# Patient Record
Sex: FEMALE | Race: WHITE | Employment: OTHER | URBAN - METROPOLITAN AREA
[De-identification: names, ages, dates, MRNs, and addresses within clinical notes are randomized per-mention and may not be internally consistent; named-entity substitution may affect disease eponyms.]

---

## 2017-05-03 ENCOUNTER — HOSPITAL ENCOUNTER (OUTPATIENT)
Dept: OTHER | Age: 52
Discharge: HOME | End: 2017-05-31

## 2017-05-08 ENCOUNTER — ANTI-COAG VISIT (OUTPATIENT)
Dept: PHARMACY | Facility: CLINIC | Age: 52
End: 2017-05-08

## 2017-05-08 DIAGNOSIS — Z95.2 HEART VALVE REPLACED: ICD-10-CM

## 2017-05-08 LAB — INR BLD: 1.4

## 2017-05-08 RX ORDER — WARFARIN SODIUM 2.5 MG/1
2.5 TABLET ORAL SEE ADMIN INSTRUCTIONS
COMMUNITY
End: 2020-12-30 | Stop reason: ALTCHOICE

## 2017-05-08 RX ORDER — CITALOPRAM 10 MG/1
10 TABLET ORAL DAILY
COMMUNITY

## 2017-05-08 RX ORDER — M-VIT,TX,IRON,MINS/CALC/FOLIC 27MG-0.4MG
1 TABLET ORAL DAILY
COMMUNITY

## 2017-05-15 ENCOUNTER — ANTI-COAG VISIT (OUTPATIENT)
Dept: PHARMACY | Facility: CLINIC | Age: 52
End: 2017-05-15

## 2017-05-15 DIAGNOSIS — Z95.2 HEART VALVE REPLACED: ICD-10-CM

## 2017-05-15 LAB — INR BLD: 2.6

## 2017-05-22 ENCOUNTER — ANTI-COAG VISIT (OUTPATIENT)
Dept: PHARMACY | Facility: CLINIC | Age: 52
End: 2017-05-22

## 2017-05-22 LAB — INR BLD: 2.9

## 2017-06-05 ENCOUNTER — ANTI-COAG VISIT (OUTPATIENT)
Dept: PHARMACY | Facility: CLINIC | Age: 52
End: 2017-06-05

## 2017-06-05 DIAGNOSIS — Z95.2 HEART VALVE REPLACED: ICD-10-CM

## 2017-06-05 LAB — INR BLD: 3.1

## 2017-06-26 ENCOUNTER — ANTI-COAG VISIT (OUTPATIENT)
Dept: PHARMACY | Facility: CLINIC | Age: 52
End: 2017-06-26

## 2017-06-26 LAB — INR BLD: 2.1

## 2017-07-10 ENCOUNTER — ANTI-COAG VISIT (OUTPATIENT)
Dept: PHARMACY | Facility: CLINIC | Age: 52
End: 2017-07-10

## 2017-07-10 LAB — INR BLD: 4.2

## 2017-07-24 ENCOUNTER — ANTI-COAG VISIT (OUTPATIENT)
Dept: PHARMACY | Facility: CLINIC | Age: 52
End: 2017-07-24

## 2017-07-24 LAB — INR BLD: 5.4

## 2017-07-31 ENCOUNTER — ANTI-COAG VISIT (OUTPATIENT)
Dept: PHARMACY | Facility: CLINIC | Age: 52
End: 2017-07-31

## 2017-07-31 LAB — INR BLD: 1.4

## 2017-08-07 ENCOUNTER — ANTI-COAG VISIT (OUTPATIENT)
Dept: PHARMACY | Facility: CLINIC | Age: 52
End: 2017-08-07

## 2017-08-07 LAB — INR BLD: 1.8

## 2017-08-14 ENCOUNTER — ANTI-COAG VISIT (OUTPATIENT)
Dept: PHARMACY | Facility: CLINIC | Age: 52
End: 2017-08-14

## 2017-08-14 LAB — INR BLD: 3.5

## 2017-08-28 ENCOUNTER — ANTI-COAG VISIT (OUTPATIENT)
Dept: PHARMACY | Facility: CLINIC | Age: 52
End: 2017-08-28

## 2017-08-28 DIAGNOSIS — Z95.2 HEART VALVE REPLACED: ICD-10-CM

## 2017-08-28 LAB — INR BLD: 5.2

## 2017-09-11 ENCOUNTER — ANTI-COAG VISIT (OUTPATIENT)
Dept: PHARMACY | Facility: CLINIC | Age: 52
End: 2017-09-11

## 2017-09-11 DIAGNOSIS — Z95.2 HEART VALVE REPLACED: ICD-10-CM

## 2017-09-11 LAB — INR BLD: 3.6

## 2017-10-02 ENCOUNTER — ANTI-COAG VISIT (OUTPATIENT)
Dept: PHARMACY | Facility: CLINIC | Age: 52
End: 2017-10-02

## 2017-10-02 LAB — INR BLD: 4

## 2017-10-02 NOTE — PROGRESS NOTES
Ms. Dontrell Medrano is here for management of anticoagulation for mitral heart valve replacement. No other significant PMH. She presents today w/out complaint. Pt verifies dosing regimen as listed above. Pt denies s/s bleeding/bruising/swelling/SOB. No BRBPR. No melena. Denies missed doses. Reviewed pt medication list.  No changes in RX/OTCs/Herbal medications. Reviewed dietary concerns. Denies tobacco use, occasional EToH use. She has returned to previous normal diet, had previously been avoiding greens. Has been taking 5 mg daily instead of 2.5 mg on Sun. INR 4.0 is above acceptable therapeutic range of 2.5-3.5. Due to patient taking more than was directed. Recommend to reduce to 5 mg daily except 2.5 mg Q Sun. Patient states she will also start to eat more salads in her diet. Patient has 5 mg tablets. Will continue to monitor and check INR in 3 weeks. Dosing reminder card given with phone number, appointment date and time.    Return to clinic: 10/23 @ 9:30 am    Mariama Washington, PharmD 10/2/2017 9:17 AM

## 2017-10-25 ENCOUNTER — ANTI-COAG VISIT (OUTPATIENT)
Dept: PHARMACY | Facility: CLINIC | Age: 52
End: 2017-10-25

## 2017-10-25 LAB — INR BLD: 2.2

## 2017-10-25 NOTE — PROGRESS NOTES
Ms. Earnest Valencia is here for management of anticoagulation for mitral heart valve replacement. No other significant PMH. She presents today w/out complaint. Pt verifies dosing regimen as listed above. Pt denies s/s bleeding/bruising/swelling/SOB. No BRBPR. No melena. Denies missed doses. Reviewed pt medication list.  No changes in RX/OTCs/Herbal medications. Reviewed dietary concerns. Denies tobacco use, occasional EToH use. Now INR is low after dose reduction last visit. Patient enjoys to eat salads, so will increase dose and instructed patient to continue her vitamin k intake. INR 2.2 is slightly below acceptable therapeutic range of 2.5-3.5. Recommend to increase to 5 mg daily. Patient has 5 mg tablets. Will continue to monitor and check INR in 2 weeks. Dosing reminder card given with phone number, appointment date and time.    Return to clinic: 11/13 @ 9:30am

## 2017-11-13 ENCOUNTER — ANTI-COAG VISIT (OUTPATIENT)
Dept: PHARMACY | Facility: CLINIC | Age: 52
End: 2017-11-13

## 2017-11-13 LAB — INR BLD: 2.9

## 2017-12-11 ENCOUNTER — ANTI-COAG VISIT (OUTPATIENT)
Dept: PHARMACY | Facility: CLINIC | Age: 52
End: 2017-12-11

## 2017-12-11 LAB — INR BLD: 2

## 2017-12-11 NOTE — PROGRESS NOTES
Ms. Harjinder Beckford is here for management of anticoagulation for mitral heart valve replacement. No other significant PMH. She presents today w/out complaint. Pt verifies dosing regimen as listed above. Pt denies s/s bleeding/bruising/swelling/SOB. No BRBPR. No melena. Denies missed doses. Reviewed pt medication list.  No changes in RX/OTCs/Herbal medications. Reviewed dietary concerns. Denies tobacco use, occasional EToH use. Patient did not want to change dose today, she would rather alter diet. Patient only available on mondays, so due to upcoming holidays, will allow 4 weeks. INR 2.0 is slightly below acceptable therapeutic range of 2.5-3.5. Recommend to take 7.5 mg today, then continue 5 mg daily. Patient has 5 mg tablets. Will continue to monitor and check INR in 4 weeks. Dosing reminder card given with phone number, appointment date and time.    Return to clinic: 1/8 @ 9:00am

## 2018-01-10 ENCOUNTER — ANTI-COAG VISIT (OUTPATIENT)
Dept: PHARMACY | Facility: CLINIC | Age: 53
End: 2018-01-10

## 2018-01-10 DIAGNOSIS — Z95.2 HEART VALVE REPLACED: ICD-10-CM

## 2018-01-10 LAB — INR BLD: 4.4

## 2018-01-17 ENCOUNTER — ANTI-COAG VISIT (OUTPATIENT)
Dept: PHARMACY | Facility: CLINIC | Age: 53
End: 2018-01-17

## 2018-01-17 LAB — INR BLD: 3.1

## 2018-01-17 NOTE — PROGRESS NOTES
Ms. Venessa Storm is here for management of anticoagulation for mitral heart valve replacement. No other significant PMH. She presents today w/out complaint. Pt verifies dosing regimen as listed above. Pt denies s/s bleeding/bruising/swelling/SOB. No BRBPR. No melena. Denies missed doses. Reviewed pt medication list.  No changes in RX/OTCs/Herbal medications. Reviewed dietary concerns. Denies tobacco use, occasional EToH use. INR 3.1 is within acceptable therapeutic range of 2.5-3.5. Recommend to continue 5 mg daily. Patient has 5 mg tablets. Will continue to monitor and check INR in 2 weeks. Dosing reminder card given with phone number, appointment date and time.    Return to clinic: 1/29 @ 9:00am

## 2018-01-31 ENCOUNTER — ANTI-COAG VISIT (OUTPATIENT)
Dept: PHARMACY | Facility: CLINIC | Age: 53
End: 2018-01-31

## 2018-01-31 LAB — INR BLD: 2.5

## 2018-02-28 ENCOUNTER — ANTI-COAG VISIT (OUTPATIENT)
Dept: PHARMACY | Facility: CLINIC | Age: 53
End: 2018-02-28

## 2018-02-28 LAB — INR BLD: 2.7

## 2018-03-28 ENCOUNTER — ANTI-COAG VISIT (OUTPATIENT)
Dept: PHARMACY | Facility: CLINIC | Age: 53
End: 2018-03-28

## 2018-03-28 LAB — INR BLD: 3.6

## 2018-04-25 ENCOUNTER — ANTI-COAG VISIT (OUTPATIENT)
Dept: PHARMACY | Facility: CLINIC | Age: 53
End: 2018-04-25

## 2018-04-25 LAB — INR BLD: 3.8

## 2018-05-02 ENCOUNTER — ANTI-COAG VISIT (OUTPATIENT)
Dept: PHARMACY | Facility: CLINIC | Age: 53
End: 2018-05-02

## 2018-05-02 DIAGNOSIS — Z95.2 HEART VALVE REPLACED: ICD-10-CM

## 2018-05-02 LAB — INR BLD: 1.6

## 2018-05-23 ENCOUNTER — ANTI-COAG VISIT (OUTPATIENT)
Dept: PHARMACY | Facility: CLINIC | Age: 53
End: 2018-05-23

## 2018-05-23 DIAGNOSIS — Z95.2 HEART VALVE REPLACED: ICD-10-CM

## 2018-05-23 LAB — INR BLD: 2.8

## 2018-06-20 ENCOUNTER — ANTI-COAG VISIT (OUTPATIENT)
Dept: PHARMACY | Facility: CLINIC | Age: 53
End: 2018-06-20

## 2018-06-20 DIAGNOSIS — Z95.2 HEART VALVE REPLACED: ICD-10-CM

## 2018-06-20 LAB — INR BLD: 1.6

## 2018-07-11 ENCOUNTER — ANTI-COAG VISIT (OUTPATIENT)
Dept: PHARMACY | Facility: CLINIC | Age: 53
End: 2018-07-11

## 2018-07-11 DIAGNOSIS — Z95.2 HEART VALVE REPLACED: ICD-10-CM

## 2018-07-11 LAB — INR BLD: 6.7

## 2018-07-11 NOTE — PROGRESS NOTES
Ms. Grecia Gonsalez is here for management of anticoagulation for mitral heart valve replacement. No other significant PMH. She presents today w/out complaint. Pt verifies dosing regimen as listed above. Pt denies s/s bleeding/bruising/swelling/SOB. No BRBPR. No melena. Denies missed doses. Reviewed pt medication list.  No changes in RX/OTCs/Herbal medications. Reviewed dietary concerns. Denies tobacco use, occasional EToH use.  6/4/18 Update: [facility] called and wanted patient's INR goal to change to 2.0 - 3.0. Patient had a prosthetic bovine mitral valve replacement and would therefore have a goal INR of 2-3. Will change goal.     No new medications or changes in medications. No change in diet. No abnormal bleeding but does admit to more bruising. Unclear why INR so high today. Has otherwise been mostly stable with this dose, including some low INRs. INR 6.7 is above acceptable therapeutic range of 2.0 - 3.0. Recommend hold dose for 2 days (7/11 and 7/12) then reduce dose to 2.5 mg Fri then 5mg all other days. Patient has 5 mg tablets. Will continue to monitor and check INR in 1 weeks. Dosing reminder card given with phone number, appointment date and time. Return to clinic: 7/18/18 at 8am.    I have seen the patient and reviewed the progress note written by the PharmD Candidate. I agree with this assessment and plan.    Xochilt Irving, Khloe 7/11/2018 8:21 AM

## 2018-07-18 ENCOUNTER — ANTI-COAG VISIT (OUTPATIENT)
Dept: PHARMACY | Age: 53
End: 2018-07-18
Payer: COMMERCIAL

## 2018-07-18 DIAGNOSIS — Z95.2 HEART VALVE REPLACED: ICD-10-CM

## 2018-07-18 LAB — INR BLD: 1.6

## 2018-07-18 PROCEDURE — 99211 OFF/OP EST MAY X REQ PHY/QHP: CPT | Performed by: PHARMACIST

## 2018-07-18 PROCEDURE — 85610 PROTHROMBIN TIME: CPT | Performed by: PHARMACIST

## 2018-07-23 ENCOUNTER — ANTI-COAG VISIT (OUTPATIENT)
Dept: PHARMACY | Age: 53
End: 2018-07-23
Payer: COMMERCIAL

## 2018-07-23 DIAGNOSIS — Z95.2 HEART VALVE REPLACED: ICD-10-CM

## 2018-07-23 LAB — INR BLD: 2.7

## 2018-07-23 PROCEDURE — 99211 OFF/OP EST MAY X REQ PHY/QHP: CPT | Performed by: PHARMACIST

## 2018-07-23 PROCEDURE — 85610 PROTHROMBIN TIME: CPT | Performed by: PHARMACIST

## 2018-07-23 NOTE — PROGRESS NOTES
Ms. Maribel Bernard is here for management of anticoagulation after bioprosthetic mitral heart valve replacement. She was placed on coumadin post operatively for acute, nonocclusive, but substantial thrombus involving the right axillary vein, subclavian vein, brachiocephalic vein, and internal jugular vein. Also Paroxysmal AFib   No other significant PMH. She presents today w/out complaint. Pt verifies dosing regimen as listed above. Pt denies s/s bleeding/bruising/swelling/SOB. No BRBPR. No melena. Denies missed doses. Reviewed pt medication list.  No changes in RX/OTCs/Herbal medications. Reviewed dietary concerns. Denies tobacco use, occasional EToH use.  18 Update: [facility] called and wanted patient's INR goal to change to 2.0 - 3.0. Patient had a prosthetic bovine mitral valve replacement and would therefore have a goal INR of 2-3. Will change goal.     Pt may be able to stop warfarin? Has appt with MD this week. INR back in range after being very high then low last week. Will continue with this slightly reduced dose. INR 2.7 is within acceptable therapeutic range of 2.0 - 3.0. Recommend to continue 2.5 mg Fri then 5mg all other days. Patient has 5 mg tablets. Will continue to monitor and check INR in 2  weeks. Dosing reminder card given with phone number, appointment date and time.    Return to clinic: 18 at 9:00 am.    Sheldon Tiwari PharmD 1:45 PM 18

## 2018-07-30 ENCOUNTER — HOSPITAL ENCOUNTER (OUTPATIENT)
Dept: GENERAL RADIOLOGY | Age: 53
Discharge: HOME | End: 2018-07-30
Payer: COMMERCIAL

## 2018-07-30 ENCOUNTER — ANTI-COAG VISIT (OUTPATIENT)
Dept: PHARMACY | Age: 53
End: 2018-07-30
Payer: COMMERCIAL

## 2018-07-30 ENCOUNTER — HOSPITAL ENCOUNTER (OUTPATIENT)
Age: 53
Discharge: HOME | End: 2018-07-30
Payer: COMMERCIAL

## 2018-07-30 DIAGNOSIS — M25.569 KNEE PAIN, UNSPECIFIED CHRONICITY, UNSPECIFIED LATERALITY: ICD-10-CM

## 2018-07-30 DIAGNOSIS — M25.551 PAIN OF BOTH HIP JOINTS: ICD-10-CM

## 2018-07-30 DIAGNOSIS — Z95.2 HEART VALVE REPLACED: ICD-10-CM

## 2018-07-30 DIAGNOSIS — M25.552 PAIN OF BOTH HIP JOINTS: ICD-10-CM

## 2018-07-30 LAB — INR BLD: 1.2

## 2018-07-30 PROCEDURE — 85610 PROTHROMBIN TIME: CPT | Performed by: PHARMACIST

## 2018-07-30 PROCEDURE — 73560 X-RAY EXAM OF KNEE 1 OR 2: CPT

## 2018-07-30 PROCEDURE — 73521 X-RAY EXAM HIPS BI 2 VIEWS: CPT

## 2018-07-30 PROCEDURE — 99211 OFF/OP EST MAY X REQ PHY/QHP: CPT | Performed by: PHARMACIST

## 2018-07-30 NOTE — PROGRESS NOTES
Ms. Wander Hardin is here for management of anticoagulation after bioprosthetic mitral heart valve replacement. She was placed on coumadin post operatively for acute, nonocclusive, but substantial thrombus involving the right axillary vein, subclavian vein, brachiocephalic vein, and internal jugular vein. Also Paroxysmal AFib   No other significant PMH. She presents today w/out complaint. Pt verifies dosing regimen as listed above. Pt denies s/s bleeding/bruising/swelling/SOB. No BRBPR. No melena. Reviewed pt medication list.--Stopped warfarin and switched to aspirin last week. Reviewed dietary concerns. Denies tobacco use, occasional EToH use. Warfarin was stopped last week by Dr. Lupe Whiteside. He requested INR check today. Pt is beyond window for needing anticoagulation for valve replacement, and per visit last week Her ECG today shows paced ventricular rhythm with underlying sinus rhythm  She has been started on ASA 81 mg daily instead. INR 1.2 is appropriate for having stopped warfarin last week  Will discharge patient from our care.     Edenilson Bañuelos, CharlineD 9:07 AM 7/30/18

## 2018-09-10 ENCOUNTER — APPOINTMENT (OUTPATIENT)
Dept: GENERAL RADIOLOGY | Age: 53
End: 2018-09-10
Payer: COMMERCIAL

## 2018-09-10 ENCOUNTER — HOSPITAL ENCOUNTER (EMERGENCY)
Age: 53
Discharge: HOME | End: 2018-09-10
Attending: EMERGENCY MEDICINE
Payer: COMMERCIAL

## 2018-09-10 VITALS
SYSTOLIC BLOOD PRESSURE: 121 MMHG | RESPIRATION RATE: 14 BRPM | DIASTOLIC BLOOD PRESSURE: 70 MMHG | TEMPERATURE: 98.2 F | HEART RATE: 78 BPM | OXYGEN SATURATION: 99 % | BODY MASS INDEX: 23.95 KG/M2 | HEIGHT: 66 IN | WEIGHT: 149 LBS

## 2018-09-10 DIAGNOSIS — S90.32XA CONTUSION OF LEFT FOOT, INITIAL ENCOUNTER: Primary | ICD-10-CM

## 2018-09-10 DIAGNOSIS — R09.81 NASAL CONGESTION: ICD-10-CM

## 2018-09-10 PROCEDURE — 73610 X-RAY EXAM OF ANKLE: CPT

## 2018-09-10 PROCEDURE — 73630 X-RAY EXAM OF FOOT: CPT

## 2018-09-10 PROCEDURE — 99283 EMERGENCY DEPT VISIT LOW MDM: CPT

## 2018-09-10 RX ORDER — FLUTICASONE PROPIONATE 50 MCG
1 SPRAY, SUSPENSION (ML) NASAL DAILY
Qty: 1 BOTTLE | Refills: 0 | Status: SHIPPED | OUTPATIENT
Start: 2018-09-10 | End: 2020-12-30

## 2018-09-10 RX ORDER — IBUPROFEN 600 MG/1
600 TABLET ORAL EVERY 6 HOURS PRN
Qty: 20 TABLET | Refills: 0 | Status: SHIPPED | OUTPATIENT
Start: 2018-09-10 | End: 2020-12-30 | Stop reason: ALTCHOICE

## 2018-09-10 ASSESSMENT — PAIN SCALES - GENERAL: PAINLEVEL_OUTOF10: 7

## 2018-09-10 ASSESSMENT — PAIN DESCRIPTION - LOCATION: LOCATION: FOOT

## 2018-09-10 ASSESSMENT — PAIN DESCRIPTION - PAIN TYPE: TYPE: ACUTE PAIN

## 2018-09-10 ASSESSMENT — PAIN DESCRIPTION - ORIENTATION: ORIENTATION: LEFT

## 2018-09-10 NOTE — ED NOTES
AVS provided and reviewed with the patient. Verbalized understanding of all including care at home, follow up care, ACE wrap and emergent symptoms to return for. Denies questions/concerns. Patient is alert/oriented, stable, and ambulatory at the time of discharge with personal belongings.        Leeann Vargas RN  09/10/18 0185

## 2018-09-10 NOTE — ED PROVIDER NOTES
is a slight contusion noted. No crepitance step-offs or deformities are appreciated  Normal ROM   neurovascular is intact with good capillary refill and sensory to all digits  Heart:  Regular rate and rhythm,    Perfusion:  intact  Respiratory:  Lungs clear to auscultation bilaterally. Respirations nonlabored. Abdominal:  Normal bowel sounds. Soft. Nontender. Non distended. Back:  No CVA tenderness to palpation     Neurological:  Alert and oriented times 3. No focal neuro deficits. Psychiatric:  Appropriate    I have reviewed and interpreted all of the currently available lab results from this visit (if applicable):  No results found for this visit on 09/10/18. Radiographs (if obtained):  [] The following radiograph was interpreted by myself in the absence of a radiologist:   [x] Radiologist's Report Reviewed:  XR ANKLE LEFT (MIN 3 VIEWS)   Final Result   No acute injury. Findings consistent with osteochondral defect from previous injury medial   talar dome. Appearance suggests that this is an unstable body. XR FOOT LEFT (MIN 3 VIEWS)   Final Result   Mild diffuse soft tissue swelling. Mild-to-moderate degenerative changes at the IP joints. Postsurgical changes from fusion at the PIP joint 2nd digit. EKG (if obtained): (All EKG's are interpreted by myself in the absence of a cardiologist)    Chart review shows recent radiographs:  No results found. MDM:  80-year-old female with a left foot injury. X-rays show old problems, including an osteochondroma and I I have requested the patient follow-up with orthopedics. She has seen one in the past.  I will also give her the 1 on call in case she cannot get in to her prior orthopedist.  She is to rest ice and elevate the foot and ankle. Return if worse or problems. She also has some nasal congestion. Her tympanic membranes are within normal limits.   I will start her on Flonase and have encouraged her to drink extra fluids. She can again return at any time and should follow-up with her primary care provider. She understands. Her questions have been answered and she is discharged in stable condition. Clinical Impression:  1. Contusion of left foot, initial encounter    2. Nasal congestion      Disposition referral (if applicable): Tonja Diop MD  [address]  [phone]    Schedule an appointment as soon as possible for a visit in 3 days      Agustín Justin, [address]  [phone]    Schedule an appointment as soon as possible for a visit in 3 days  Orthopedics or one of your own    [place]  [address] [place] [address],[address]  [phone]    If symptoms worsen    Disposition medications (if applicable):  New Prescriptions    FLUTICASONE (FLONASE) 50 MCG/ACT NASAL SPRAY    1 spray by Nasal route daily    IBUPROFEN (IBU) 600 MG TABLET    Take 1 tablet by mouth every 6 hours as needed for Pain       Comment: Please note this report has been produced using speech recognition software and may contain errors related to that system including errors in grammar, punctuation, and spelling, as well as words and phrases that may be inappropriate. If there are any questions or concerns please feel free to contact the dictating provider for clarification. Raul Cardona MD  09/10/18 7815

## 2019-05-13 ENCOUNTER — HOSPITAL ENCOUNTER (EMERGENCY)
Age: 54
Discharge: HOME | End: 2019-05-13
Attending: EMERGENCY MEDICINE
Payer: MEDICARE

## 2019-05-13 ENCOUNTER — APPOINTMENT (OUTPATIENT)
Dept: GENERAL RADIOLOGY | Age: 54
End: 2019-05-13
Payer: MEDICARE

## 2019-05-13 VITALS
HEIGHT: 66 IN | TEMPERATURE: 98.2 F | SYSTOLIC BLOOD PRESSURE: 119 MMHG | HEART RATE: 74 BPM | DIASTOLIC BLOOD PRESSURE: 78 MMHG | BODY MASS INDEX: 23.63 KG/M2 | WEIGHT: 147 LBS | OXYGEN SATURATION: 100 % | RESPIRATION RATE: 16 BRPM

## 2019-05-13 DIAGNOSIS — G89.29 ACUTE EXACERBATION OF CHRONIC LOW BACK PAIN: Primary | ICD-10-CM

## 2019-05-13 DIAGNOSIS — M54.50 ACUTE EXACERBATION OF CHRONIC LOW BACK PAIN: Primary | ICD-10-CM

## 2019-05-13 LAB
BILIRUBIN URINE: NEGATIVE
BLOOD, URINE: NEGATIVE
CLARITY: ABNORMAL
COLOR: YELLOW
GLUCOSE URINE: NEGATIVE MG/DL
KETONES, URINE: ABNORMAL MG/DL
LEUKOCYTE ESTERASE, URINE: NEGATIVE
MICROSCOPIC EXAMINATION: ABNORMAL
NITRITE, URINE: NEGATIVE
PH UA: 5.5 (ref 5–8)
PROTEIN UA: NEGATIVE MG/DL
SPECIFIC GRAVITY UA: 1.02 (ref 1–1.03)
URINE REFLEX TO CULTURE: ABNORMAL
URINE TYPE: ABNORMAL
UROBILINOGEN, URINE: 0.2 E.U./DL

## 2019-05-13 PROCEDURE — 81003 URINALYSIS AUTO W/O SCOPE: CPT

## 2019-05-13 PROCEDURE — 72100 X-RAY EXAM L-S SPINE 2/3 VWS: CPT

## 2019-05-13 PROCEDURE — 99283 EMERGENCY DEPT VISIT LOW MDM: CPT

## 2019-05-13 PROCEDURE — 6370000000 HC RX 637 (ALT 250 FOR IP): Performed by: EMERGENCY MEDICINE

## 2019-05-13 RX ORDER — LIDOCAINE 50 MG/G
1 PATCH TOPICAL DAILY
Qty: 7 PATCH | Refills: 0 | Status: SHIPPED | OUTPATIENT
Start: 2019-05-13 | End: 2019-05-20

## 2019-05-13 RX ORDER — LIDOCAINE 4 G/G
1 PATCH TOPICAL ONCE
Status: DISCONTINUED | OUTPATIENT
Start: 2019-05-13 | End: 2019-05-13 | Stop reason: HOSPADM

## 2019-05-13 RX ORDER — METHYLPREDNISOLONE 4 MG/1
TABLET ORAL
Qty: 1 KIT | Refills: 0 | Status: SHIPPED | OUTPATIENT
Start: 2019-05-13 | End: 2020-12-30

## 2019-05-13 RX ORDER — CYCLOBENZAPRINE HCL 10 MG
10 TABLET ORAL 3 TIMES DAILY PRN
Qty: 15 TABLET | Refills: 0 | Status: SHIPPED | OUTPATIENT
Start: 2019-05-13 | End: 2019-05-18

## 2019-05-13 ASSESSMENT — PAIN SCALES - GENERAL: PAINLEVEL_OUTOF10: 10

## 2019-05-13 ASSESSMENT — ENCOUNTER SYMPTOMS
ABDOMINAL PAIN: 0
COUGH: 0
SORE THROAT: 0
BACK PAIN: 1
NAUSEA: 0
EYE DISCHARGE: 0
VOMITING: 0
DIARRHEA: 0

## 2019-05-13 ASSESSMENT — PAIN DESCRIPTION - DESCRIPTORS: DESCRIPTORS: ACHING;THROBBING

## 2019-05-13 ASSESSMENT — PAIN DESCRIPTION - FREQUENCY: FREQUENCY: CONTINUOUS

## 2019-05-13 ASSESSMENT — PAIN DESCRIPTION - PAIN TYPE: TYPE: ACUTE PAIN

## 2019-05-13 ASSESSMENT — PAIN DESCRIPTION - LOCATION: LOCATION: BACK

## 2019-05-13 NOTE — ED PROVIDER NOTES
[facility]  eMERGENCY dEPARTMENT eNCOUnter        Pt Name: Sweetie Mendoza  MRN: [id number]  [place] 1965  Date of evaluation: 5/13/2019  Provider: Jones Tovar MD  PCP: Ruddy Foster MD  ED Attending: Jones Tovar MD    [address] [place] [address]       Chief Complaint   Patient presents with    Back Pain     Patient with back pain \"all over\" x 3 days, patient denies any urinary symptoms, patient states taking a percocet \"that I had at home and it didn't help. \"       HISTORY OF PRESENT ILLNESS   (Location/Symptom, Timing/Onset, Context/Setting, Quality, Duration, Modifying Factors, Severity)  Note limiting factors. Sweetie Mendoza is a 48 y.o. female Pamela Divers to the emergency department complaining of low back pain. Patient states it has been hurting for the last 3 days however last night she was not able to get any sleep. She describes it as severe sharp without radiation. Nothing seems to make it better or worse. She has taken some as needed Percocet without any relief. She denies any numbness tingling or weakness. No bowel or bladder incontinence. No IV drug abuse history. She does have previous history in 2007 of back surgery. Patient denies any saddle anesthesia. No night sweats, fevers, weight loss. History is obtained from the patient. REVIEW OF SYSTEMS    (2-9 systems for level 4, 10 or more for level 5)     Review of Systems   Constitutional: Negative for chills and fever. HENT: Negative for congestion and sore throat. Eyes: Negative for discharge. Respiratory: Negative for cough. Cardiovascular: Negative for chest pain. Gastrointestinal: Negative for abdominal pain, diarrhea, nausea and vomiting. Endocrine: Negative for polydipsia. Genitourinary: Negative for dysuria and urgency. Musculoskeletal: Positive for back pain. Negative for myalgias. Skin: Negative for rash.    Neurological: Negative for weakness, numbness and headaches. Hematological: Does not bruise/bleed easily. Psychiatric/Behavioral: Negative for confusion. Positives and Pertinent negatives as per HPI. Except as noted abovein the ROS, all other systems were reviewed and negative. PAST MEDICAL HISTORY     Past Medical History:   Diagnosis Date    Depression          SURGICAL HISTORY     Past Surgical History:   Procedure Laterality Date    BACK SURGERY      CARDIAC SURGERY      valve replacement and hole in heart    NECK SURGERY      PACEMAKER PLACEMENT      SINUS SURGERY           CURRENTMEDICATIONS       Discharge Medication List as of 5/13/2019 10:14 AM      CONTINUE these medications which have NOT CHANGED    Details   ibuprofen (IBU) 600 MG tablet Take 1 tablet by mouth every 6 hours as needed for Pain, Disp-20 tablet, R-0Print      fluticasone (FLONASE) 50 MCG/ACT nasal spray 1 spray by Nasal route daily, Disp-1 Bottle, R-0Print      loratadine (CLARITIN) 10 MG tablet Take 1 tablet by mouth daily, Disp-30 tablet, R-0Print      oxyCODONE-acetaminophen (PERCOCET) 7.5-325 MG per tablet Take 1 tablet by mouth every 4 hours as needed for Pain . Historical Med      warfarin (COUMADIN) 2.5 MG tablet Take 2.5 mg by mouth See Admin Instructions Take 5 mg daily, except 2.5 mg MWFHistorical Med      citalopram (CELEXA) 10 MG tablet Take 10 mg by mouth dailyHistorical Med      Multiple Vitamins-Minerals (THERAPEUTIC MULTIVITAMIN-MINERALS) tablet Take 1 tablet by mouth dailyHistorical Med               ALLERGIES     Compazine [prochlorperazine maleate]; Hydrocodone; Hydrocodone-acetaminophen; Pregabalin; and Prochlorperazine    FAMILYHISTORY     History reviewed. No pertinent family history.        SOCIAL HISTORY       Social History     Socioeconomic History    Marital status:      Spouse name: None    Number of children: None    Years of education: None    Highest education level: None   Occupational History    None   Social Needs    Financial resource strain: None    Food insecurity:     Worry: None     Inability: None    Transportation needs:     Medical: None     Non-medical: None   Tobacco Use    Smoking status: Never Smoker    Smokeless tobacco: Never Used   Substance and Sexual Activity    Alcohol use: Yes     Comment: occ    Drug use: No    Sexual activity: Yes     Partners: Male   Lifestyle    Physical activity:     Days per week: None     Minutes per session: None    Stress: None   Relationships    Social connections:     Talks on phone: None     Gets together: None     Attends Spiritism service: None     Active member of club or organization: None     Attends meetings of clubs or organizations: None     Relationship status: None    Intimate partner violence:     Fear of current or ex partner: None     Emotionally abused: None     Physically abused: None     Forced sexual activity: None   Other Topics Concern    None   Social History Narrative    None       SCREENINGS             PHYSICAL EXAM    (up to 7 for level 4, 8 or more for level 5)     ED Triage Vitals   BP Temp Temp src Pulse Resp SpO2 Height Weight   -- -- -- -- -- -- -- --       Physical Exam   Constitutional: She is oriented to person, place, and time. She appears well-developed and well-nourished. No distress. Uncomfortable and rubbing her low back. HENT:   Head: Normocephalic and atraumatic. Right Ear: External ear normal.   Left Ear: External ear normal.   Nose: Nose normal.   Mouth/Throat: Oropharynx is clear and moist. No oropharyngeal exudate. Eyes: Pupils are equal, round, and reactive to light. Conjunctivae are normal.   Neck: Normal range of motion. Neck supple. Cardiovascular: Normal rate, regular rhythm, normal heart sounds and intact distal pulses. Exam reveals no gallop and no friction rub. No murmur heard. Pulmonary/Chest: Effort normal and breath sounds normal. No respiratory distress. She has no wheezes. Abdominal: Soft.  Bowel sounds are normal. She exhibits no distension. There is no tenderness. There is no rebound and no guarding. Musculoskeletal: Normal range of motion. She exhibits no edema. Thoracic back: Normal.        Lumbar back: She exhibits tenderness, bony tenderness and pain. She exhibits normal range of motion, no swelling, no edema, no deformity, no laceration and no spasm. Back:    Lymphadenopathy:     She has no cervical adenopathy. Neurological: She is alert and oriented to person, place, and time. She has normal strength. No cranial nerve deficit or sensory deficit. Coordination and gait normal. GCS eye subscore is 4. GCS verbal subscore is 5. GCS motor subscore is 6. Reflex Scores:       Patellar reflexes are 2+ on the right side and 2+ on the left side. Achilles reflexes are 2+ on the right side and 2+ on the left side. Skin: Skin is warm and dry. No rash noted. Psychiatric: She has a normal mood and affect. DIAGNOSTIC RESULTS   LABS:    Results for orders placed or performed during the hospital encounter of 05/13/19   Urinalysis Reflex to Culture   Result Value Ref Range    Color, UA Yellow Straw/Yellow    Clarity, UA SL CLOUDY (A) Clear    Glucose, Ur Negative Negative mg/dL    Bilirubin Urine Negative Negative    Ketones, Urine TRACE (A) Negative mg/dL    Specific Gravity, UA 1.025 1.005 - 1.030    Blood, Urine Negative Negative    pH, UA 5.5 5.0 - 8.0    Protein, UA Negative Negative mg/dL    Urobilinogen, Urine 0.2 <2.0 E.U./dL    Nitrite, Urine Negative Negative    Leukocyte Esterase, Urine Negative Negative    Microscopic Examination Not Indicated     Urine Reflex to Culture Not Indicated     Urine Type Not Specified        All other labs were within normal range ornot returned as of this dictation. EKG:  All EKG's are interpreted by the Emergency Department Physician who either signs or Co-signs this chart in the absence of a cardiologist.  Please see their note for interpretation of EKG. RADIOLOGY:   Non-plain film images such as CT, Ultrasound and MRI are read by the radiologist.Plain radiographic images are visualized and preliminarily interpreted by the  ED Provider with the belowfindings:    Interpretation per the Radiologist below, if available at the time of this note:    XR LUMBAR SPINE (2-3 VIEWS)   Final Result   Stable postsurgical changes. PROCEDURES   Unless otherwise noted below, none     Procedures    CRITICAL CARE TIME   N/A    CONSULTS:  None      EMERGENCY DEPARTMENT COURSE and DIFFERENTIAL DIAGNOSIS/MDM:   Vitals:    Vitals:    05/13/19 0925   BP: 119/78   Pulse: 74   Resp: 16   Temp: 98.2 °F (36.8 °C)   TempSrc: Oral   SpO2: 100%   Weight: 147 lb (66.7 kg)   Height: 5' 6\" (1.676 m)       Patient was given the following medications:  Medications - No data to display    Imaging and urinalysis were obtained. Neither show any acute abnormalities. Patient drove herself to the emergency department limiting her treatment options. She cannot take anti-inflammatories. At this point I am going to place the patient on a steroid burst, provide her with muscle relaxer. I want her to follow up with her normal back pain treatment team.  Patient is agreeable with this plan. She understands that I cannot prescribe additional narcotic pain medication given that she is receiving this from another physician. I estimate there is LOW risk for ABDOMINAL AORTIC ANEURYSM, CAUDA EQUINA SYNDROME, EPIDURAL MASS LESION, SPINAL STENOSIS, OR HERNIATED DISK CAUSING SEVERE STENOSIS, thus I consider the discharge disposition reasonable. [address] [place] [address] and I have discussed the diagnosis and risks, and we agree with discharging home to follow-up with their primary doctor. We also discussed returning to the Emergency Department immediately if new or worsening symptoms occur.  We have discussed the symptoms which are most concerning (e.g., saddle anesthesia, urinary or bowel incontinence or retention, changing or worsening pain) that necessitate immediate return. FINAL IMPRESSION      1. Acute exacerbation of chronic low back pain          DISPOSITION/PLAN   DISPOSITION Decision To Discharge 05/13/2019 10:12:53 AM      PATIENT REFERRED TO:  Elsie Padilla MD  [address]  [phone]    Schedule an appointment as soon as possible for a visit in 3 days      [place] [address].  [facility]  Christina Mcdonald [address]  [phone]  Go to   If symptoms worsen      DISCHARGE MEDICATIONS:  Discharge Medication List as of 5/13/2019 10:14 AM      START taking these medications    Details   cyclobenzaprine (FLEXERIL) 10 MG tablet Take 1 tablet by mouth 3 times daily as needed for Muscle spasms, Disp-15 tablet, R-0Normal      lidocaine (LIDODERM) 5 % Place 1 patch onto the skin daily for 7 days 12 hours on, 12 hours off., Disp-7 patch, R-0Normal             DISCONTINUED MEDICATIONS:  Discharge Medication List as of 5/13/2019 10:14 AM                 (Please note that portions of this note were completed with a voice recognition program.  Efforts were [facility] edit the dictations but occasionally words are mis-transcribed.)    Ana Arguello MD(electronically signed)              Ana Arguello MD  05/13/19 7386

## 2020-12-30 ENCOUNTER — APPOINTMENT (OUTPATIENT)
Dept: GENERAL RADIOLOGY | Age: 55
End: 2020-12-30
Payer: MEDICARE

## 2020-12-30 ENCOUNTER — HOSPITAL ENCOUNTER (EMERGENCY)
Age: 55
Discharge: HOME | End: 2020-12-30
Attending: EMERGENCY MEDICINE
Payer: MEDICARE

## 2020-12-30 VITALS
OXYGEN SATURATION: 100 % | HEIGHT: 66 IN | SYSTOLIC BLOOD PRESSURE: 128 MMHG | DIASTOLIC BLOOD PRESSURE: 70 MMHG | HEART RATE: 87 BPM | BODY MASS INDEX: 23.3 KG/M2 | TEMPERATURE: 98 F | WEIGHT: 145 LBS | RESPIRATION RATE: 14 BRPM

## 2020-12-30 PROCEDURE — 6370000000 HC RX 637 (ALT 250 FOR IP): Performed by: EMERGENCY MEDICINE

## 2020-12-30 PROCEDURE — 29125 APPL SHORT ARM SPLINT STATIC: CPT

## 2020-12-30 PROCEDURE — 99284 EMERGENCY DEPT VISIT MOD MDM: CPT

## 2020-12-30 PROCEDURE — 73030 X-RAY EXAM OF SHOULDER: CPT

## 2020-12-30 PROCEDURE — 73110 X-RAY EXAM OF WRIST: CPT

## 2020-12-30 PROCEDURE — 73070 X-RAY EXAM OF ELBOW: CPT

## 2020-12-30 RX ORDER — IBUPROFEN 400 MG/1
800 TABLET ORAL ONCE
Status: DISCONTINUED | OUTPATIENT
Start: 2020-12-30 | End: 2020-12-30 | Stop reason: HOSPADM

## 2020-12-30 RX ORDER — OXYCODONE HYDROCHLORIDE 5 MG/1
10 TABLET ORAL ONCE
Status: COMPLETED | OUTPATIENT
Start: 2020-12-30 | End: 2020-12-30

## 2020-12-30 RX ORDER — PROPOFOL 10 MG/ML
100 INJECTION, EMULSION INTRAVENOUS ONCE
Status: DISCONTINUED | OUTPATIENT
Start: 2020-12-30 | End: 2020-12-30

## 2020-12-30 RX ORDER — OXYCODONE HYDROCHLORIDE AND ACETAMINOPHEN 5; 325 MG/1; MG/1
1 TABLET ORAL EVERY 6 HOURS PRN
Qty: 12 TABLET | Refills: 0 | Status: SHIPPED | OUTPATIENT
Start: 2020-12-30 | End: 2020-12-31 | Stop reason: ALTCHOICE

## 2020-12-30 RX ADMIN — OXYCODONE HYDROCHLORIDE 10 MG: 5 TABLET ORAL at 15:42

## 2020-12-30 ASSESSMENT — PAIN DESCRIPTION - LOCATION
LOCATION: ARM;WRIST
LOCATION: ARM;SHOULDER;WRIST

## 2020-12-30 ASSESSMENT — PAIN SCALES - GENERAL
PAINLEVEL_OUTOF10: 10
PAINLEVEL_OUTOF10: 8
PAINLEVEL_OUTOF10: 10

## 2020-12-30 ASSESSMENT — PAIN DESCRIPTION - PAIN TYPE
TYPE: ACUTE PAIN
TYPE: ACUTE PAIN

## 2020-12-30 ASSESSMENT — PAIN DESCRIPTION - ONSET
ONSET: ON-GOING
ONSET: ON-GOING

## 2020-12-30 ASSESSMENT — PAIN DESCRIPTION - ORIENTATION
ORIENTATION: LEFT
ORIENTATION: LEFT

## 2020-12-30 ASSESSMENT — PAIN DESCRIPTION - FREQUENCY
FREQUENCY: CONTINUOUS
FREQUENCY: CONTINUOUS

## 2020-12-30 ASSESSMENT — PAIN DESCRIPTION - DESCRIPTORS
DESCRIPTORS: CONSTANT
DESCRIPTORS: CONSTANT

## 2020-12-30 ASSESSMENT — PAIN DESCRIPTION - PROGRESSION: CLINICAL_PROGRESSION: GRADUALLY IMPROVING

## 2020-12-30 NOTE — ED PROVIDER NOTES
Emergency Physician Note  [id number] [address] [address]  Dept: [phone]  Loc: [phone]  Open Note Time:  4:02 PM EST    Chief Complaint  Arm Injury (left arm injury after trip and fall. c/o left shoulder and wrist pain)       History of Present Illness  Jose Luz is a 54 y.o. female  has a past medical history of Depression. who presents to the ED for left arm injury. Patient was using a hover board when she fell forward, [address] mechanism of the left arm. Had immediate pain and deformity to her left wrist she is also having difficulty rotating her left shoulder and she is complaining of left shoulder pain. Denies any pain in her elbow. She did not hit her head, did not lose consciousness. Does not believe she injured any other extremities of her body. Denies fever, chills, malaise, chest pain, shortness of breath, cough, abdominal pain, nausea, vomiting, diarrhea, headache, sore throat, dysuria, back pain, rash. No palliative/provocative factors. Unless otherwise stated in this report or unable to obtain because of the patient's clinical or mental status as evidenced by the medical record, this patient's positive and negative responses for review of systems, constitutional, psych, eyes, ENT, cardiovascular, respiratory, gastrointestinal, neurological, genitourinary, musculoskeletal, integument systems and systems related to the presenting problem are either stated in the preceding paragraph or were not pertinent or were negative for the symptoms and/or complaints related to the medical problem. I have reviewed the following from the nursing documentation:      Prior to Admission medications    Medication Sig Start Date End Date Taking? Authorizing Provider   oxyCODONE-acetaminophen (PERCOCET) 7.5-325 MG per tablet Take 1 tablet by mouth every 4 hours as needed for Pain .    Yes Historical Provider, MD loratadine (CLARITIN) 10 MG tablet Take 1 tablet by mouth daily 4/9/18   Taisha Reed PA-C   citalopram (CELEXA) 10 MG tablet Take 10 mg by mouth daily    Historical Provider, MD   Multiple Vitamins-Minerals (THERAPEUTIC MULTIVITAMIN-MINERALS) tablet Take 1 tablet by mouth daily    Historical Provider, MD       Allergies as of 12/30/2020 - Review Complete 12/30/2020   Allergen Reaction Noted    Compazine [prochlorperazine maleate]  08/14/2017    Hydrocodone Itching     Hydrocodone-acetaminophen Other (See Comments) 04/20/2018    Pregabalin      Prochlorperazine  12/05/2008       Past Medical History:   Diagnosis Date    Depression         Surgical History:   Past Surgical History:   Procedure Laterality Date    BACK SURGERY      CARDIAC SURGERY      valve replacement and hole in heart    NECK SURGERY      PACEMAKER PLACEMENT      SINUS SURGERY          Family History:  History reviewed. No pertinent family history.     Social History     Socioeconomic History    Marital status:      Spouse name: Not on file    Number of children: Not on file    Years of education: Not on file    Highest education level: Not on file   Occupational History    Not on file   Social Needs    Financial resource strain: Not on file    Food insecurity     Worry: Not on file     Inability: Not on file    Transportation needs     Medical: Not on file     Non-medical: Not on file   Tobacco Use    Smoking status: Never Smoker    Smokeless tobacco: Never Used   Substance and Sexual Activity    Alcohol use: Yes     Comment: occ    Drug use: No    Sexual activity: Yes     Partners: Male   Lifestyle    Physical activity     Days per week: Not on file     Minutes per session: Not on file    Stress: Not on file   Relationships    Social connections     Talks on phone: Not on file     Gets together: Not on file     Attends Restorationist service: Not on file     Active member of club or organization: Not on file have some pain radiating into her left shoulder  Left shoulder: No tenderness to palpation along the clavicle no AC joint tenderness, having difficulty with abduction of the left arm, the left shoulder does not appear to be inferior compared to the right shoulder. distal pulses present and equal bilaterally. Confirmed good radial pulses in both arms. BACK:  No midline tenderness in the cervical, thoracic, and lumbar spine. No deformities, no step-off. SKIN:  Warm, dry and intact. NEUROLOGIC:  Normal mental status. Moving all extremities to command. Alert and oriented x4   without focal motor deficit or gross sensory deficit. Normal speech. PSYCHIATRIC:  Not anxious,   normal mood and affect,   thoughts are linear and organized,   without delusions/hallucinations,   Not responding to internal stimuli,  responds appropriately to questions    LABS and DIAGNOSTIC RESULTS    RADIOLOGY  X-RAYS:  I have reviewed radiologic plain film image(s). ALL OTHER NON-PLAIN FILM IMAGES SUCH AS CT, ULTRASOUND AND MRI HAVE BEEN READ BY THE RADIOLOGIST. XR SHOULDER LEFT (MIN 2 VIEWS)   Final Result   Acute, comminuted, intra-articular fracture of the distal radius. No acute osseous injury of the left shoulder or elbow. XR ELBOW LEFT (2 VIEWS)   Final Result   Acute, comminuted, intra-articular fracture of the distal radius. No acute osseous injury of the left shoulder or elbow. XR WRIST LEFT (MIN 3 VIEWS)   Final Result   Acute, comminuted, intra-articular fracture of the distal radius. No acute osseous injury of the left shoulder or elbow. LABS  No results found for this visit on 12/30/20.     SCREENINGS  NIH Score     [facility]  [place] Coma Scale  Eye Opening: Spontaneous  Best Verbal Response: Oriented  Best Motor Response: Obeys commands  [place] Coma Scale Score: 15  [facility]    Heart Score              PROCEDURES    PROCEDURE:  FRACTURE CARE  Pre-splint neurovascular check:  Left upper extremity is neurovascularly intact with brisk cap refills and palpable extremity pulse. A splint was applied to the injured extremity. Person who placed the splint:  Nasir Tremont  Fracture type and location: Distal radial left wrist fracture  Splint type: Vlar  Post-splint neurovascular check: Left upper extremity is neurovascularly intact with brisk cap refills and palpable extremity pulse. Patient reports that her fingers frequently get cold and then no more cold than usual.  I do not have any concerns for compartment syndrome based on her post splint examination    The patient verbalized full understanding of how to care for injured extremity with splint. MEDICAL DECISION MAKING    Procedures/interventions/images ordered for this visit  Orders Placed This Encounter   Procedures    XR WRIST LEFT (MIN 3 VIEWS)    XR ELBOW LEFT (2 VIEWS)    XR SHOULDER LEFT (MIN 2 VIEWS)       Medications ordered for this visit  Orders Placed This Encounter   Medications    oxyCODONE (ROXICODONE) immediate release tablet 10 mg       ED course notes for this visit       I wore N95 Envo mask with filter protection, facial shield and gloves when I evaluated the patient. I evaluated the patient in room 11/11    Discussed the case with Dr. Gilda Dubon, the [facility] surgeon, he states there is no need for reduction as any reports at this time I can place a volar splint and he will follow up with her in the office either tomorrow or the following day. He informed me that he will call the patient himself and make arrangements for follow-up    This is a very pleasant patient who unfortunately has sustained a fracture. There is no significant evidence of compartment syndrome, neurovascular compromise, or other process requiring immediate surgical intervention at this time.   It is understood that other fractures, ligament injury, tendon injury, cartilage injury, and joint injury have been considered and cannot be completely excluded. Patient was reassessed after splinting and neurovascular status remained intact, alignment of the splint is good. Pain management and follow-up plan were discussed with the patient. It is understood that if the patient is not improving as expected or if other new symptoms or signs of concern develop, other etiologies or diagnoses may need to be considered requiring other tests, treatments, consultations, and/or admission. The diagnosis, plan, expected course, follow-up, and return precautions were discussed and all questions were answered. Final Impression    1. Fall from standing electric scooter, initial encounter    2. Other closed intra-articular fracture of distal end of left radius, initial encounter    3. Shoulder strain, left, initial encounter        Blood pressure 128/70, pulse 87, temperature 98 °F (36.7 °C), temperature source Oral, resp. rate 14, height 5' 6\" (1.676 m), weight 145 lb (65.8 kg), SpO2 100 %. Disposition  At this point I do not feel the patient requires further work up and it is reasonable to discharge the patient. I had a discussion with the patient and/or their surrogate regarding diagnosis, diagnostic testing results, treatment/ plan of care, and follow up. Patient and/or companions verbalized understanding of the ED workup, any relevant findings as well as any incidental findings, and the disposition and plan. There was shared decision-making between myself as well as the patient and/or their surrogate and we are all in agreement with discharge home. Trena Shore was an opportunity for questions and all questions were answered to the best of my ability and to the satisfaction of the patient and/or patient's family. Patient agreed to follow up as recommend for further evaluation/treatment. The patient was given strict return precautions as we discussed symptoms that would necessitate return to the ED. Patient will return to ED for new/worsening symptoms.  The patient verbalized their understanding and agreement with the above plan. Please refer to AVS for further details regarding discharge instructions. Patient was given scripts for the following medications. I counseled patient how to take these medications. Discharge Medication List as of 12/30/2020  6:30 PM      START taking these medications    Details   oxyCODONE-acetaminophen (PERCOCET) 5-325 MG per tablet Take 1 tablet by mouth every 6 hours as needed for Pain for up to 7 days. , Disp-12 tablet, R-0Normal             Patient had scripts modified or refilled for the following medications. I counseled patient how to take these medications. Discharge Medication List as of 12/30/2020  6:30 PM          Pt is in stable condition upon Discharge to home. The note was completed using Dragon voice recognition transcription. Every effort was made to ensure accuracy; however, inadvertent transcription errors may be present despite my best efforts to edit errors.     Marcos Esqueda MD  [facility]        Marcos Esqueda MD  12/30/20 [id number]

## 2020-12-31 ENCOUNTER — HOSPITAL ENCOUNTER (EMERGENCY)
Age: 55
Discharge: HOME | End: 2020-12-31
Attending: EMERGENCY MEDICINE
Payer: MEDICARE

## 2020-12-31 VITALS
BODY MASS INDEX: 23.3 KG/M2 | TEMPERATURE: 97.9 F | OXYGEN SATURATION: 99 % | SYSTOLIC BLOOD PRESSURE: 109 MMHG | HEART RATE: 85 BPM | HEIGHT: 66 IN | WEIGHT: 145 LBS | RESPIRATION RATE: 16 BRPM | DIASTOLIC BLOOD PRESSURE: 73 MMHG

## 2020-12-31 PROCEDURE — 29125 APPL SHORT ARM SPLINT STATIC: CPT

## 2020-12-31 PROCEDURE — 99284 EMERGENCY DEPT VISIT MOD MDM: CPT

## 2020-12-31 PROCEDURE — 6370000000 HC RX 637 (ALT 250 FOR IP): Performed by: EMERGENCY MEDICINE

## 2020-12-31 RX ORDER — ALENDRONATE SODIUM 70 MG/1
70 TABLET ORAL WEEKLY
COMMUNITY
Start: 2020-10-27 | End: 2021-06-30

## 2020-12-31 RX ORDER — OXYCODONE HYDROCHLORIDE AND ACETAMINOPHEN 5; 325 MG/1; MG/1
1 TABLET ORAL ONCE
Status: COMPLETED | OUTPATIENT
Start: 2020-12-31 | End: 2020-12-31

## 2020-12-31 RX ADMIN — OXYCODONE HYDROCHLORIDE AND ACETAMINOPHEN 1 TABLET: 5; 325 TABLET ORAL at 00:58

## 2020-12-31 ASSESSMENT — PAIN SCALES - GENERAL
PAINLEVEL_OUTOF10: 10
PAINLEVEL_OUTOF10: 8

## 2020-12-31 ASSESSMENT — PAIN DESCRIPTION - LOCATION: LOCATION: WRIST

## 2020-12-31 ASSESSMENT — PAIN DESCRIPTION - PROGRESSION: CLINICAL_PROGRESSION: NOT CHANGED

## 2020-12-31 ASSESSMENT — PAIN DESCRIPTION - ORIENTATION: ORIENTATION: LEFT

## 2020-12-31 NOTE — ED PROVIDER NOTES
Emergency Department Attending Note    Daniel Trinidad MD    Date of ED VIsit: 12/31/2020    CHIEF COMPLAINT  Wrist Pain (Seen today, fx left wrist.  \"Percocet isn't helping the pain\")      HISTORY OF PRESENT ILLNESS  Yaa Bañuelos is a 54 y.o. female  With Vital signs of /73   Pulse 85   Temp 97.9 °F (36.6 °C) (Oral)   Resp 16   Ht 5' 6\" (1.676 m)   Wt 145 lb (65.8 kg)   SpO2 99%   Breastfeeding No   BMI 23.40 kg/m²  who presents to the ED with a complaint of continued pain in her left wrist. Patient seen and evaluated in room 8. Patient comes in the emergency department because she said she was told to come in if she had any problems with her hand. She says that her hand is numb although physical exam of that hand reveals it to be pink with good capillary refill and good touch not cold to touch as well. The patient had a plastic Velcro splint on which was poor management choice earlier so the patient will be placed into a sugar tong splint and placed back into her sling. Regarding the pain I told her she can take 2 Percocet for pain control. .  No other complaints, modifying factors or associated symptoms. Patients Past medical history reviewed and listed below  Past Medical History:   Diagnosis Date    Depression      Past Surgical History:   Procedure Laterality Date    BACK SURGERY      CARDIAC SURGERY      valve replacement and hole in heart    NECK SURGERY      PACEMAKER PLACEMENT      SINUS SURGERY         I have reviewed the following from the nursing documentation. History reviewed. No pertinent family history.   Social History     Socioeconomic History    Marital status:      Spouse name: Not on file    Number of children: Not on file    Years of education: Not on file    Highest education level: Not on file   Occupational History    Not on file   Social Needs    Financial resource strain: Not on file    Food insecurity     Worry: Not on file     Inability: Not on file    Transportation needs     Medical: Not on file     Non-medical: Not on file   Tobacco Use    Smoking status: Never Smoker    Smokeless tobacco: Never Used   Substance and Sexual Activity    Alcohol use: Yes     Comment: occ    Drug use: No    Sexual activity: Yes     Partners: Male   Lifestyle    Physical activity     Days per week: Not on file     Minutes per session: Not on file    Stress: Not on file   Relationships    Social connections     Talks on phone: Not on file     Gets together: Not on file     Attends Orthodoxy service: Not on file     Active member of club or organization: Not on file     Attends meetings of clubs or organizations: Not on file     Relationship status: Not on file    Intimate partner violence     Fear of current or ex partner: Not on file     Emotionally abused: Not on file     Physically abused: Not on file     Forced sexual activity: Not on file   Other Topics Concern    Not on file   Social History Narrative    Not on file     No current facility-administered medications for this encounter. Current Outpatient Medications   Medication Sig Dispense Refill    loratadine (CLARITIN) 10 MG tablet Take 1 tablet by mouth daily 30 tablet 0    oxyCODONE-acetaminophen (PERCOCET) 7.5-325 MG per tablet Take 1 tablet by mouth every 4 hours as needed for Pain .  citalopram (CELEXA) 10 MG tablet Take 10 mg by mouth daily      Multiple Vitamins-Minerals (THERAPEUTIC MULTIVITAMIN-MINERALS) tablet Take 1 tablet by mouth daily      oxyCODONE-acetaminophen (PERCOCET) 5-325 MG per tablet Take 1 tablet by mouth every 6 hours as needed for Pain for up to 7 days.  12 tablet 0     Allergies   Allergen Reactions    Compazine [Prochlorperazine Maleate]     Hydrocodone Itching    Hydrocodone-Acetaminophen Other (See Comments)    Ibuprofen Other (See Comments)     \"my blood is thin and my Cardiologist said not to take it\"    Pregabalin     Prochlorperazine      Stiff neck and eyes roll back       REVIEW OF SYSTEMS  10 systems reviewed, pertinent positives per HPI otherwise noted to be negative     PHYSICAL EXAM  /73   Pulse 85   Temp 97.9 °F (36.6 °C) (Oral)   Resp 16   Ht 5' 6\" (1.676 m)   Wt 145 lb (65.8 kg)   SpO2 99%   Breastfeeding No   BMI 23.40 kg/m²   GENERAL APPEARANCE: Awake and alert. Cooperative. In mild distress. HEAD: Normocephalic. Atraumatic. EYES: PERRL. EOM's grossly intact. ENT: Mucous membranes are pink and moist.   NECK: Supple. HEART: RRR. No murmurs. LUNGS: Respirations unlabored. CTAB. Good air exchange. ABDOMEN: Soft. Non-distended. Non-tender. No masses. No organomegaly. No guarding or rebound. EXTREMITIES: No peripheral edema. Moves all extremities equally. All extremities neurovascularly intact. SKIN: Warm and dry. No acute rashes. NEUROLOGICAL: Alert and oriented. Distally she is neurovascularly intact with good capillary refill. Strength 5/5, sensation intact. Gait normal.   PSYCHIATRIC: Normal mood and affect. No HI or SI expressed to me. RADIOLOGY    If acquired see below     EKG:     If acquired see below       ED COURSE/MDM    Patient will be placed in a proper sugar tong splint. That will go up to the [facility]         The ED course and plan were reviewed and results discussed with the patient. She was told to double her Percocet dose if she needs to. The patient understood and agreed with the Discharge/transfer planning.     CLINICAL IMPRESSION and DISPOSITION    Rodney Coulter was stable and diagnosed with left wrist fracture    Patient was treated with sugar tong splint and Percocet       Azeem Mario MD  12/31/20 [id number]

## 2020-12-31 NOTE — PROGRESS NOTES
[address] [facility] time____0815________        Surgery time___0945_________    Take the following medications with a sip of water: Follow your MD/Surgeons pre-procedure instructions regarding your medications    Do not eat or drink anything after 12:00 midnight prior to your surgery. This includes water chewing gum, mints and ice chips. You may brush your teeth and gargle the morning of your surgery, but do not swallow the water     Please see your family doctor/pediatrician for a history and physical and/or concerning medications. Bring any test results/reports from your physicians office. If you are under the care of a heart doctor or specialist doctor, please be aware that you may be asked to them for clearance    You may be asked to stop blood thinners such as Coumadin, Plavix, Fragmin, Lovenox, etc., or any anti-inflammatories such as:  Aspirin, Ibuprofen, Advil, Naproxen prior to your surgery. We also ask that you stop any OTC medications such as fish oil, vitamin E, glucosamine, garlic, Multivitamins, COQ 10, etc.    We ask that you do not smoke 24 hours prior to surgery  We ask that you do not  drink any alcoholic beverages 24 hours prior to surgery     You must make arrangements for a responsible adult to take you home after your surgery. For your safety you will not be allowed to leave alone or drive yourself home. Your surgery will be cancelled if you do not have a ride home. Also for your safety, it is strongly suggested that someone stay with you the first 24 hours after your surgery. A parent or legal guardian must accompany a child scheduled for surgery and plan to stay at the hospital until the child is discharged. Please do not bring other children with you. For your comfort, please wear simple loose fitting clothing to the hospital.  Please do not bring valuables.     Do not wear any make-up or nail polish on your fingers or toes      For your safety, please do not wear any jewelry or body piercing's on the day of surgery. All jewelry must be removed. If you have dentures, they will be removed before going to operating room. For your convenience, we will provide you with a container. If you wear contact lenses or glasses, they will be removed, please bring a case for them. If you have a living will and a durable power of  for healthcare, please bring in a copy. As part of our patient safety program to minimize surgical site infections, we ask you to do the following:    · Please notify your surgeon if you develop any illness between         now and the  day of your surgery. · This includes a cough, cold, fever, sore throat, nausea,         or vomiting, and diarrhea, etc.  ·  Please notify your surgeon if you experience dizziness, shortness         of breath or blurred vision between now and the time of your surgery. Do not shave your operative site 96 hours prior to surgery. For face and neck surgery, men may use an electric razor 48 hours   prior to surgery. You may shower the night before surgery or the morning of   your surgery with an antibacterial soap. You will need to bring a photo ID and insurance card    [facility] has an onsite pharmacy, would you like to utilize our pharmacy     If you will be staying overnight and use a C-pap machine, please bring   your C-pap to hospital     Our goal is to provide you with excellent care, therefore, visitors will be limited to two(2) in the room at a time so that we may focus on providing this care for you. Please contact pre-admission testing if you have any further questions. [facility] phone number:  [phone] PAT fax number:  [phone]  Please note these are generalized instructions for all surgical cases, you may be provided with more specific instructions according to your surgery.

## 2020-12-31 NOTE — PROGRESS NOTES

## 2021-01-02 ENCOUNTER — ANESTHESIA EVENT (OUTPATIENT)
Dept: OPERATING ROOM | Age: 56
End: 2021-01-02
Payer: MEDICARE

## 2021-01-04 ENCOUNTER — ANESTHESIA (OUTPATIENT)
Dept: OPERATING ROOM | Age: 56
End: 2021-01-04
Payer: MEDICARE

## 2021-01-04 ENCOUNTER — HOSPITAL ENCOUNTER (OUTPATIENT)
Age: 56
Setting detail: OUTPATIENT SURGERY
Discharge: HOME | End: 2021-01-04
Attending: ORTHOPAEDIC SURGERY | Admitting: ORTHOPAEDIC SURGERY
Payer: MEDICARE

## 2021-01-04 ENCOUNTER — APPOINTMENT (OUTPATIENT)
Dept: GENERAL RADIOLOGY | Age: 56
End: 2021-01-04
Attending: ORTHOPAEDIC SURGERY
Payer: MEDICARE

## 2021-01-04 ENCOUNTER — TELEPHONE (OUTPATIENT)
Dept: ORTHOPEDIC SURGERY | Age: 56
End: 2021-01-04

## 2021-01-04 VITALS
HEIGHT: 66 IN | SYSTOLIC BLOOD PRESSURE: 131 MMHG | TEMPERATURE: 97.1 F | HEART RATE: 93 BPM | BODY MASS INDEX: 23.3 KG/M2 | DIASTOLIC BLOOD PRESSURE: 82 MMHG | RESPIRATION RATE: 18 BRPM | OXYGEN SATURATION: 98 % | WEIGHT: 145 LBS

## 2021-01-04 VITALS
DIASTOLIC BLOOD PRESSURE: 65 MMHG | SYSTOLIC BLOOD PRESSURE: 109 MMHG | OXYGEN SATURATION: 99 % | RESPIRATION RATE: 5 BRPM

## 2021-01-04 LAB — SARS-COV-2, NAAT: NOT DETECTED

## 2021-01-04 PROCEDURE — 2500000003 HC RX 250 WO HCPCS: Performed by: ORTHOPAEDIC SURGERY

## 2021-01-04 PROCEDURE — 6370000000 HC RX 637 (ALT 250 FOR IP): Performed by: ANESTHESIOLOGY

## 2021-01-04 PROCEDURE — C1713 ANCHOR/SCREW BN/BN,TIS/BN: HCPCS | Performed by: ORTHOPAEDIC SURGERY

## 2021-01-04 PROCEDURE — 2580000003 HC RX 258: Performed by: ORTHOPAEDIC SURGERY

## 2021-01-04 PROCEDURE — 3209999900 FLUORO FOR SURGICAL PROCEDURES

## 2021-01-04 PROCEDURE — 2500000003 HC RX 250 WO HCPCS: Performed by: NURSE ANESTHETIST, CERTIFIED REGISTERED

## 2021-01-04 PROCEDURE — 7100000001 HC PACU RECOVERY - ADDTL 15 MIN: Performed by: ORTHOPAEDIC SURGERY

## 2021-01-04 PROCEDURE — 7100000011 HC PHASE II RECOVERY - ADDTL 15 MIN: Performed by: ORTHOPAEDIC SURGERY

## 2021-01-04 PROCEDURE — 6360000002 HC RX W HCPCS: Performed by: NURSE ANESTHETIST, CERTIFIED REGISTERED

## 2021-01-04 PROCEDURE — 3600000004 HC SURGERY LEVEL 4 BASE: Performed by: ORTHOPAEDIC SURGERY

## 2021-01-04 PROCEDURE — 6360000002 HC RX W HCPCS: Performed by: ANESTHESIOLOGY

## 2021-01-04 PROCEDURE — 6360000002 HC RX W HCPCS: Performed by: ORTHOPAEDIC SURGERY

## 2021-01-04 PROCEDURE — 7100000000 HC PACU RECOVERY - FIRST 15 MIN: Performed by: ORTHOPAEDIC SURGERY

## 2021-01-04 PROCEDURE — 3600000014 HC SURGERY LEVEL 4 ADDTL 15MIN: Performed by: ORTHOPAEDIC SURGERY

## 2021-01-04 PROCEDURE — 7100000010 HC PHASE II RECOVERY - FIRST 15 MIN: Performed by: ORTHOPAEDIC SURGERY

## 2021-01-04 PROCEDURE — 25609 OPTX DST RD XART FX/EP SEP3+: CPT | Performed by: ORTHOPAEDIC SURGERY

## 2021-01-04 PROCEDURE — 25609 OPTX DST RD XART FX/EP SEP3+: CPT | Performed by: NURSE PRACTITIONER

## 2021-01-04 PROCEDURE — U0002 COVID-19 LAB TEST NON-CDC: HCPCS

## 2021-01-04 PROCEDURE — 3700000001 HC ADD 15 MINUTES (ANESTHESIA): Performed by: ORTHOPAEDIC SURGERY

## 2021-01-04 PROCEDURE — 2580000003 HC RX 258: Performed by: ANESTHESIOLOGY

## 2021-01-04 PROCEDURE — 99219 PR INITIAL OBSERVATION CARE/DAY 50 MINUTES: CPT | Performed by: ORTHOPAEDIC SURGERY

## 2021-01-04 PROCEDURE — 2720000010 HC SURG SUPPLY STERILE: Performed by: ORTHOPAEDIC SURGERY

## 2021-01-04 PROCEDURE — 3700000000 HC ANESTHESIA ATTENDED CARE: Performed by: ORTHOPAEDIC SURGERY

## 2021-01-04 PROCEDURE — 2709999900 HC NON-CHARGEABLE SUPPLY: Performed by: ORTHOPAEDIC SURGERY

## 2021-01-04 DEVICE — LOCKING SCREW, FULLY THREADED,T8
Type: IMPLANTABLE DEVICE | Site: WRIST | Status: FUNCTIONAL
Brand: VARIAX

## 2021-01-04 DEVICE — BONE SCREW, FULLY THREADED, T8
Type: IMPLANTABLE DEVICE | Site: WRIST | Status: FUNCTIONAL
Brand: VARIAX

## 2021-01-04 DEVICE — VOLAR PLATE INTERMEDIATE LEFT, X-SHORT
Type: IMPLANTABLE DEVICE | Site: WRIST | Status: FUNCTIONAL
Brand: VARIAX

## 2021-01-04 RX ORDER — CEPHALEXIN 500 MG/1
500 CAPSULE ORAL 4 TIMES DAILY
Qty: 20 CAPSULE | Refills: 0 | Status: SHIPPED | OUTPATIENT
Start: 2021-01-04 | End: 2021-01-09

## 2021-01-04 RX ORDER — OXYCODONE HYDROCHLORIDE 10 MG/1
10 TABLET ORAL PRN
Status: COMPLETED | OUTPATIENT
Start: 2021-01-04 | End: 2021-01-04

## 2021-01-04 RX ORDER — OXYCODONE HYDROCHLORIDE 5 MG/1
5 TABLET ORAL PRN
Status: COMPLETED | OUTPATIENT
Start: 2021-01-04 | End: 2021-01-04

## 2021-01-04 RX ORDER — SODIUM CHLORIDE 0.9 % (FLUSH) 0.9 %
10 SYRINGE (ML) INJECTION PRN
Status: DISCONTINUED | OUTPATIENT
Start: 2021-01-04 | End: 2021-01-04 | Stop reason: HOSPADM

## 2021-01-04 RX ORDER — MAGNESIUM HYDROXIDE 1200 MG/15ML
LIQUID ORAL CONTINUOUS PRN
Status: COMPLETED | OUTPATIENT
Start: 2021-01-04 | End: 2021-01-04

## 2021-01-04 RX ORDER — ONDANSETRON 2 MG/ML
INJECTION INTRAMUSCULAR; INTRAVENOUS PRN
Status: DISCONTINUED | OUTPATIENT
Start: 2021-01-04 | End: 2021-01-04 | Stop reason: SDUPTHER

## 2021-01-04 RX ORDER — FENTANYL CITRATE 50 UG/ML
25 INJECTION, SOLUTION INTRAMUSCULAR; INTRAVENOUS EVERY 5 MIN PRN
Status: DISCONTINUED | OUTPATIENT
Start: 2021-01-04 | End: 2021-01-04 | Stop reason: HOSPADM

## 2021-01-04 RX ORDER — MORPHINE SULFATE 2 MG/ML
1 INJECTION, SOLUTION INTRAMUSCULAR; INTRAVENOUS EVERY 5 MIN PRN
Status: DISCONTINUED | OUTPATIENT
Start: 2021-01-04 | End: 2021-01-04 | Stop reason: HOSPADM

## 2021-01-04 RX ORDER — BUPIVACAINE HYDROCHLORIDE 5 MG/ML
INJECTION, SOLUTION EPIDURAL; INTRACAUDAL
Status: COMPLETED | OUTPATIENT
Start: 2021-01-04 | End: 2021-01-04

## 2021-01-04 RX ORDER — DEXAMETHASONE SODIUM PHOSPHATE 4 MG/ML
INJECTION, SOLUTION INTRA-ARTICULAR; INTRALESIONAL; INTRAMUSCULAR; INTRAVENOUS; SOFT TISSUE PRN
Status: DISCONTINUED | OUTPATIENT
Start: 2021-01-04 | End: 2021-01-04 | Stop reason: SDUPTHER

## 2021-01-04 RX ORDER — EPHEDRINE SULFATE 50 MG/ML
INJECTION INTRAVENOUS PRN
Status: DISCONTINUED | OUTPATIENT
Start: 2021-01-04 | End: 2021-01-04

## 2021-01-04 RX ORDER — FENTANYL CITRATE 50 UG/ML
50 INJECTION, SOLUTION INTRAMUSCULAR; INTRAVENOUS EVERY 5 MIN PRN
Status: DISCONTINUED | OUTPATIENT
Start: 2021-01-04 | End: 2021-01-04 | Stop reason: HOSPADM

## 2021-01-04 RX ORDER — SODIUM CHLORIDE 0.9 % (FLUSH) 0.9 %
10 SYRINGE (ML) INJECTION EVERY 12 HOURS SCHEDULED
Status: DISCONTINUED | OUTPATIENT
Start: 2021-01-04 | End: 2021-01-04 | Stop reason: HOSPADM

## 2021-01-04 RX ORDER — EPHEDRINE SULFATE/0.9% NACL/PF 50 MG/5 ML
SYRINGE (ML) INTRAVENOUS PRN
Status: DISCONTINUED | OUTPATIENT
Start: 2021-01-04 | End: 2021-01-04 | Stop reason: SDUPTHER

## 2021-01-04 RX ORDER — FENTANYL CITRATE 50 UG/ML
INJECTION, SOLUTION INTRAMUSCULAR; INTRAVENOUS PRN
Status: DISCONTINUED | OUTPATIENT
Start: 2021-01-04 | End: 2021-01-04 | Stop reason: SDUPTHER

## 2021-01-04 RX ORDER — MORPHINE SULFATE 2 MG/ML
2 INJECTION, SOLUTION INTRAMUSCULAR; INTRAVENOUS EVERY 5 MIN PRN
Status: DISCONTINUED | OUTPATIENT
Start: 2021-01-04 | End: 2021-01-04 | Stop reason: HOSPADM

## 2021-01-04 RX ORDER — MIDAZOLAM HYDROCHLORIDE 1 MG/ML
INJECTION INTRAMUSCULAR; INTRAVENOUS PRN
Status: DISCONTINUED | OUTPATIENT
Start: 2021-01-04 | End: 2021-01-04 | Stop reason: SDUPTHER

## 2021-01-04 RX ORDER — LIDOCAINE HYDROCHLORIDE 20 MG/ML
INJECTION, SOLUTION EPIDURAL; INFILTRATION; INTRACAUDAL; PERINEURAL PRN
Status: DISCONTINUED | OUTPATIENT
Start: 2021-01-04 | End: 2021-01-04 | Stop reason: SDUPTHER

## 2021-01-04 RX ORDER — MEPERIDINE HYDROCHLORIDE 25 MG/ML
12.5 INJECTION INTRAMUSCULAR; INTRAVENOUS; SUBCUTANEOUS EVERY 5 MIN PRN
Status: DISCONTINUED | OUTPATIENT
Start: 2021-01-04 | End: 2021-01-04 | Stop reason: HOSPADM

## 2021-01-04 RX ORDER — PROPOFOL 10 MG/ML
INJECTION, EMULSION INTRAVENOUS PRN
Status: DISCONTINUED | OUTPATIENT
Start: 2021-01-04 | End: 2021-01-04 | Stop reason: SDUPTHER

## 2021-01-04 RX ORDER — SODIUM CHLORIDE 9 MG/ML
INJECTION, SOLUTION INTRAVENOUS CONTINUOUS
Status: DISCONTINUED | OUTPATIENT
Start: 2021-01-04 | End: 2021-01-04 | Stop reason: HOSPADM

## 2021-01-04 RX ORDER — ONDANSETRON 2 MG/ML
4 INJECTION INTRAMUSCULAR; INTRAVENOUS
Status: DISCONTINUED | OUTPATIENT
Start: 2021-01-04 | End: 2021-01-04 | Stop reason: HOSPADM

## 2021-01-04 RX ADMIN — PROPOFOL 50 MG: 10 INJECTION, EMULSION INTRAVENOUS at 09:16

## 2021-01-04 RX ADMIN — Medication 10 MG: at 09:40

## 2021-01-04 RX ADMIN — FENTANYL CITRATE 50 MCG: 50 INJECTION INTRAMUSCULAR; INTRAVENOUS at 09:15

## 2021-01-04 RX ADMIN — OXYCODONE HYDROCHLORIDE 10 MG: 10 TABLET ORAL at 11:18

## 2021-01-04 RX ADMIN — LIDOCAINE HYDROCHLORIDE 80 MG: 20 INJECTION, SOLUTION EPIDURAL; INFILTRATION; INTRACAUDAL; PERINEURAL at 09:15

## 2021-01-04 RX ADMIN — MIDAZOLAM 2 MG: 1 INJECTION INTRAMUSCULAR; INTRAVENOUS at 09:08

## 2021-01-04 RX ADMIN — CEFAZOLIN SODIUM 2 G: 10 INJECTION, POWDER, FOR SOLUTION INTRAVENOUS at 09:07

## 2021-01-04 RX ADMIN — DEXAMETHASONE SODIUM PHOSPHATE 8 MG: 4 INJECTION, SOLUTION INTRAMUSCULAR; INTRAVENOUS at 09:31

## 2021-01-04 RX ADMIN — ONDANSETRON 4 MG: 2 INJECTION INTRAMUSCULAR; INTRAVENOUS at 09:56

## 2021-01-04 RX ADMIN — SODIUM CHLORIDE: 9 INJECTION, SOLUTION INTRAVENOUS at 08:36

## 2021-01-04 RX ADMIN — FENTANYL CITRATE 50 MCG: 50 INJECTION, SOLUTION INTRAMUSCULAR; INTRAVENOUS at 10:34

## 2021-01-04 RX ADMIN — FENTANYL CITRATE 50 MCG: 50 INJECTION, SOLUTION INTRAMUSCULAR; INTRAVENOUS at 10:39

## 2021-01-04 RX ADMIN — PROPOFOL 150 MG: 10 INJECTION, EMULSION INTRAVENOUS at 09:15

## 2021-01-04 ASSESSMENT — PULMONARY FUNCTION TESTS
PIF_VALUE: 3
PIF_VALUE: 8
PIF_VALUE: 3
PIF_VALUE: 0
PIF_VALUE: 3
PIF_VALUE: 2
PIF_VALUE: 19
PIF_VALUE: 3
PIF_VALUE: 2
PIF_VALUE: 2
PIF_VALUE: 8
PIF_VALUE: 3
PIF_VALUE: 9
PIF_VALUE: 3
PIF_VALUE: 3
PIF_VALUE: 19
PIF_VALUE: 1
PIF_VALUE: 17
PIF_VALUE: 2
PIF_VALUE: 21
PIF_VALUE: 17
PIF_VALUE: 19
PIF_VALUE: 3
PIF_VALUE: 19
PIF_VALUE: 17
PIF_VALUE: 3
PIF_VALUE: 16
PIF_VALUE: 3
PIF_VALUE: 1
PIF_VALUE: 6
PIF_VALUE: 3
PIF_VALUE: 3
PIF_VALUE: 8
PIF_VALUE: 0
PIF_VALUE: 18
PIF_VALUE: 8

## 2021-01-04 ASSESSMENT — PAIN - FUNCTIONAL ASSESSMENT
PAIN_FUNCTIONAL_ASSESSMENT: PREVENTS OR INTERFERES SOME ACTIVE ACTIVITIES AND ADLS
PAIN_FUNCTIONAL_ASSESSMENT: 0-10
PAIN_FUNCTIONAL_ASSESSMENT: PREVENTS OR INTERFERES SOME ACTIVE ACTIVITIES AND ADLS

## 2021-01-04 ASSESSMENT — PAIN DESCRIPTION - DESCRIPTORS
DESCRIPTORS: ACHING
DESCRIPTORS: ACHING
DESCRIPTORS: THROBBING

## 2021-01-04 ASSESSMENT — PAIN DESCRIPTION - PROGRESSION: CLINICAL_PROGRESSION: GRADUALLY WORSENING

## 2021-01-04 ASSESSMENT — PAIN DESCRIPTION - PAIN TYPE
TYPE: ACUTE PAIN
TYPE: SURGICAL PAIN

## 2021-01-04 ASSESSMENT — PAIN DESCRIPTION - LOCATION
LOCATION: WRIST
LOCATION: WRIST

## 2021-01-04 ASSESSMENT — PAIN SCALES - GENERAL: PAINLEVEL_OUTOF10: 8

## 2021-01-04 ASSESSMENT — PAIN DESCRIPTION - ORIENTATION
ORIENTATION: LEFT
ORIENTATION: LEFT

## 2021-01-04 ASSESSMENT — PAIN DESCRIPTION - FREQUENCY
FREQUENCY: CONTINUOUS
FREQUENCY: CONTINUOUS

## 2021-01-04 ASSESSMENT — PAIN DESCRIPTION - ONSET: ONSET: ON-GOING

## 2021-01-04 NOTE — BRIEF OP NOTE
Brief Postoperative Note      Patient: Suzy Do  YOB: 1965  MRN: [id number]    Date of Procedure: 1/4/2021    Pre-Op Diagnosis: LEFT DISTAL RADIUS FRACTURE    Post-Op Diagnosis: Same       Procedure(s):  OPEN REDUCTION INTERNAL FIXATION LEFT DISTAL RADIUS    Surgeon(s):  Vicky Ballard MD    Assistant: Bjorn Angel CNP    Anesthesia: General    Estimated Blood Loss (mL): Minimal    Complications: None    Specimens:   * No specimens in log *    Implants:  Implant Name Type Inv. Item Serial No.  Lot No. LRB No. Used Action   PLATE BNE T60PM [id number] 10 H XSH L DST RAD VOLAR TAMMI TI  PLATE BNE [id number] 10 H XSH L DST RAD VOLAR TAMMI TI  [employer] [facility]  Left 1 Implanted   SCREW BNE LCK 2.7X12 MM TI STRL VARIAX  SCREW BNE LCK 2.7X12 MM TI STRL VARIAX  [employer] [facility]  Left 1 Implanted   SCREW BNE L16MM OD27MM ST TIMO FULL THRD T8 DRV  SCREW BNE L16MM OD27MM ST TIMO FULL THRD T8 DRV  [employer] [facility]  Left 1 Implanted   SCREW BNE L18MM OD27MM ST TIMO FULL THRD T8 DRV  SCREW BNE L18MM OD27MM ST TIMO FULL THRD T8 DRV  [employer] [facility]  Left 4 Implanted   SCREW BNE L20MM OD27MM ST TIMO FULL THRD T8 DRV  SCREW BNE L20MM OD27MM ST TIMO FULL THRD T8 DRV  [facility]  Left 2 Implanted   SCREW T8 BONE 2. [id number]  SCREW T8 BONE 2. [id number]  [employer] [facility]  Left 1 Implanted         Drains: * No LDAs found *    Findings: Same    Electronically signed by Vicky Ballard MD on 1/4/2021 at 12:54 PM

## 2021-01-04 NOTE — PROGRESS NOTES
Pt snoring after second dose of pain medication, see mar. VSS, o2 sat did drop to 89% on RA after sencond dose of pain med, rebounded quickly to low 90's on room air. LUE dressing remains D&I. Ice pack in place. Normal brachial pulse, brisk cap refill, fingers warm to touch. Full sensation, able to wiggle fingers. IV site WDL.

## 2021-01-04 NOTE — H&P
Preoperative H&P Update    The patient's History and Physical in the medical record from 1/4/2021 was reviewed by me today. Past Medical History:   Diagnosis Date    Anxiety and depression     Aortic valve disease 2016    Arthritis     History of blood transfusion     Pacemaker     Medtronic     Past Surgical History:   Procedure Laterality Date    BACK SURGERY      rods/screw L4,5,6/ DJD    CARDIAC SURGERY  2017    aortic valve replacement and hole in heart    NECK SURGERY      screws and pins    PACEMAKER PLACEMENT  2017    medtronic model # [id number]    SINUS SURGERY       No current facility-administered medications on file prior to encounter. Current Outpatient Medications on File Prior to Encounter   Medication Sig Dispense Refill    alendronate (FOSAMAX) 70 MG tablet Take 70 mg by mouth once a week      Biotin 5 MG TBDP Take 1 tablet by mouth daily      Cholecalciferol 50 MCG (2000 UT) CAPS 2,000 Units daily       oxyCODONE-acetaminophen (PERCOCET) 7.5-325 MG per tablet Take 1 tablet by mouth every 4 hours as needed for Pain .  citalopram (CELEXA) 10 MG tablet Take 10 mg by mouth daily      Multiple Vitamins-Minerals (THERAPEUTIC MULTIVITAMIN-MINERALS) tablet Take 1 tablet by mouth daily         Allergies   Allergen Reactions    Compazine [Prochlorperazine Maleate] Other (See Comments)     Pt states becomes stiff--eyes roll behind head    Hydrocodone Itching    Hydrocodone-Acetaminophen Other (See Comments)     insomnia    Ibuprofen Other (See Comments)     \"my blood is thin and my Cardiologist said not to take it\"    Pregabalin Other (See Comments)     Pt unable to recall reaction    Sulfamethoxazole-Trimethoprim Rash      I reviewed the HPI, medications, allergies, reason for surgery, diagnosis and treatment plan and there has been no change. The patient was evaluated by me today.  Physical exam findings for this update include:    Vitals:    01/04/21 0811   BP: 131/72 Pulse: 55   Resp: 17   Temp: 97.2 °F (36.2 °C)   SpO2: 96%     Airway is intact  Chest: chest clear, no wheezing, rales, normal symmetric air entry, no tachypnea, retractions or cyanosis  Heart: regular rate and rhythm ; heart sounds normal  Findings on exam of the body region where surgery is to be performed include:  Left distal radius fracture.     Electronically signed by Neeraj Olmedo on 1/4/2021 at 8:23 AM

## 2021-01-04 NOTE — PROGRESS NOTES
Tolerating being up in chair. Continues with pain, but states she is ready to go home. Dr. Arturo Russ into write script for antibiotic.

## 2021-01-04 NOTE — PROGRESS NOTES
Pt A&Ox4 at this time. VSS. Rates pain in left wrist 8/10 but continues to fall asleep between questions. Ace wrap D&I. Normal brachial pulse, brisk cap refill, normal sensation, fingers warm to touch, able to wiggle fingers. IV site WDL.

## 2021-01-04 NOTE — PROGRESS NOTES
Pt to PACU from OR. Unresponsive at this time. VSS. Does not appear to be in any distress. LUE dressing D&I. Ice pack applied. Fingers warm to touch, brisk cap refill, normal brachial pulse. IV site WDL.

## 2021-01-04 NOTE — CONSULTS
[facility]  Consult Note        This patient is seen in consultation at the request of Dr Rowena aLm MD    Reason for Consult:  Left wrist pain/ markedly displaced distal radius intraarticular fracture. CHIEF COMPLAINT:  Left wrist pain/ fall. History Obtained From:  patient, electronic medical record    HISTORY OF PRESENT ILLNESS:    Ms. Junior Weaver is a 54 y.o.  female left handed who presents today for evaluation of a left wrist injury. The patient reports that this injury occurred when she fell using a hover board. She was first seen and evaluated in [place], when she was x-rayed and splinted, and asked to f/u with Orthopedics. The patient denies any other injuries. Rates pain a 9/10 VAS moderate, sharp, constant and show no change. Movement makes the pain worse, the splint and resting makes the pain better. Alleviating factors rest. No numbness or tingling sensation. Past Medical History:        Diagnosis Date    Anxiety and depression     Aortic valve disease 2016    Arthritis     History of blood transfusion     Pacemaker     Medtronic       Past Surgical History:        Procedure Laterality Date    BACK SURGERY      rods/screw L4,5,6/ DJD    CARDIAC SURGERY  2017    aortic valve replacement and hole in heart    FOREARM SURGERY Left 1/4/2021    OPEN REDUCTION INTERNAL FIXATION LEFT DISTAL RADIUS performed by Ainsley Molina MD at [address] [place] [address] Drive      screws and pins    PACEMAKER PLACEMENT  2017    medtronic model # [id number]    SINUS SURGERY         Medications prior to admission:   Prior to Admission medications    Medication Sig Start Date End Date Taking?  Authorizing Provider   alendronate (FOSAMAX) 70 MG tablet Take 70 mg by mouth once a week 10/27/20  Yes Historical Provider, MD   Biotin 5 MG TBDP Take 1 tablet by mouth daily 10/24/20  Yes Historical Provider, MD   Cholecalciferol 50 MCG (2000 UT) CAPS 2,000 Units daily    Yes Historical Provider, MD oxyCODONE-acetaminophen (PERCOCET) 7.5-325 MG per tablet Take 1 tablet by mouth every 4 hours as needed for Pain . Yes Historical Provider, MD   citalopram (CELEXA) 10 MG tablet Take 10 mg by mouth daily   Yes Historical Provider, MD   Multiple Vitamins-Minerals (THERAPEUTIC MULTIVITAMIN-MINERALS) tablet Take 1 tablet by mouth daily   Yes Historical Provider, MD       Current Medications:   No current facility-administered medications for this encounter.      Allergies:  Compazine [prochlorperazine maleate], Hydrocodone, Hydrocodone-acetaminophen, Ibuprofen, Pregabalin, and Sulfamethoxazole-trimethoprim    Social History     Socioeconomic History    Marital status:      Spouse name: Not on file    Number of children: Not on file    Years of education: Not on file    Highest education level: Not on file   Occupational History    Not on file   Social Needs    Financial resource strain: Not on file    Food insecurity     Worry: Not on file     Inability: Not on file    Transportation needs     Medical: Not on file     Non-medical: Not on file   Tobacco Use    Smoking status: Never Smoker    Smokeless tobacco: Never Used   Substance and Sexual Activity    Alcohol use: Yes     Comment: occ    Drug use: Never    Sexual activity: Yes     Partners: Male   Lifestyle    Physical activity     Days per week: Not on file     Minutes per session: Not on file    Stress: Not on file   Relationships    Social connections     Talks on phone: Not on file     Gets together: Not on file     Attends Gnosticist service: Not on file     Active member of club or organization: Not on file     Attends meetings of clubs or organizations: Not on file     Relationship status: Not on file    Intimate partner violence     Fear of current or ex partner: Not on file     Emotionally abused: Not on file     Physically abused: Not on file     Forced sexual activity: Not on file   Other Topics Concern    Not on file   Social History Narrative    Not on file       Family History:  Family History   Problem Relation Age of Onset    No Known Problems Mother     Heart Attack Father          REVIEW OF SYSTEMS:   CONSTITUTIONAL: Denies unexplained weight loss, fevers, chills or fatigue  NEUROLOGICAL: Denies unsteady gait or progressive weakness    PSYCHOLOGICAL: Denies anxiety, depression   SKIN: Denies skin changes, delayed healing, rash, itching   HEMATOLOGIC: Denies easy bleeding or bruising  ENDOCRINE: Denies excessive thirst, urination, heat/cold  RESPIRATORY: Denies current dyspnea, cough  CARDIOVASCULAR: Negative for chest pain at this time. EYES: Negative for photophobia and visual disturbance. ENT:  Negative for rhinorrhea, epistaxis, sore throat, or hearing loss. GI: Denies nausea, vomiting, diarrhea   : Denies bowel or bladder issues   MUSCULOSKELETAL: Left wrist pain. All other ROS reviewed in chart or with patient or family and are grossly negative. PHYSICAL EXAMINATION:  Ms. Britta Bryant is a very pleasant 54 y.o. female who seen today in no acute distress, awake, alert, and oriented. She is well nourished and groomed. Patient with normal affect. Body mass index is 23.4 kg/m². . Skin warm and dry. Resting respiratory rate is 16. Resp deep and easy. Pulse is with regular rate and rhythm    /72   Pulse 55   Temp 97.2 °F (36.2 °C) (Temporal)   Resp 17   Ht 5' 6\" (1.676 m)   Wt 145 lb (65.8 kg)   SpO2 96%   BMI 23.40 kg/m²        Airway is intact  Chest: chest clear, no wheezing, rales, normal symmetric air entry, no tachypnea, retractions or cyanosis  Heart: regular rate and rhythm ; heart sounds normal   Hearing intact, pupil equal and reactive bilateral  Lymphatics; No groin or axillary enlarged lymph nodes. Neck; No swelling  Abdomen; soft, non distended. MUSCULOSKELETAL:   On evaluation of her left upper extremity, there is moderate deformity. There is moderate swelling and moderate ecchymosis.   She is tender to palpation over the distal radius, and otherwise nontender over the remainder of the extremity. Range of motion is decreased secondary to pain over the left wrist, but no mechanical block. The skin overlying the left wrist is intact without evidence of lesion, laceration or abrasion. Distal pulses are 2+ and symmetric bilaterally. Sensation is grossly intact to light touch and symmetric bilaterally. NEUROLOGIC:   Sensory:    Touch:                     Right Upper Extremity:  normal                   Left Upper Extremity:  normal                  Right Lower Extremity:  normal                  Left Lower Extremity:  normal        DATA:    CBC: No results found for: WBC, RBC, HGB, HCT, MCV, MCH, MCHC, RDW, PLT, MPV  WBC:  No results found for: WBC  PT/INR:    Lab Results   Component Value Date    PROTIME 24.9 04/09/2018    INR 1.2 07/30/2018     PTT:  No results found for: APTT[APTT    IMAGING: Xrays dated 12/30/2020, 3 views of left wrist were reviewed, and showed markedly displaced distal radius intraarticular fracture. IMPRESSION: Left wrist pain/ markedly displaced distal radius intraarticular fracture. PLAN:  I discussed with Simran Li the overall alignment of the fracture and treatment options including both surgical and non-surgical treatment, and that my recommendation is an open reduction and internal fixation given the amount of displacement and comminution of the fracture. I discussed the risks and benefits of surgery with the patient, including but not limited to infection, bleeding, pain, injury to nerves or blood vessels failure of the surgery and need for additional surgery. All the patient's questions were answered. We discussed an expected post-operative course. She  is understanding of this and wishes to proceed. Thank you very much for the kind consultation and allowing me to participate in this patient's care.   I will continue to keep you apprised of her progress.          Roddy Ivey MD   1/4/2021  8:23 AM

## 2021-01-04 NOTE — TELEPHONE ENCOUNTER
CPT: 56372  BODY PART: left wrist  AUTHORIZATION: NPR    Per website (AIM), [phone]  Insurance is [employer] [facility]

## 2021-01-04 NOTE — ANESTHESIA POSTPROCEDURE EVALUATION
Department of Anesthesiology  Postprocedure Note    Patient: Dontrell Callaway  MRN: [id number]  YOB: 1965  Date of evaluation: 1/4/2021  Time:  5:20 PM     Procedure Summary     Date: 01/04/21 Room / Location:  [facility] Bou[facility] [address] [place] [address]    Anesthesia Start: 0908 Anesthesia Stop: 0228    Procedure: OPEN REDUCTION INTERNAL FIXATION LEFT DISTAL RADIUS (Left ) Diagnosis: (LEFT DISTAL RADIUS FRACTURE)    Surgeons: Ainsley Molina MD Responsible Provider: Sky Peña MD    Anesthesia Type: general ASA Status: 3          Anesthesia Type: general    Layla Phase I: Layla Score: 10    Layla Phase II: Layla Score: 9    Last vitals: Reviewed and per EMR flowsheets.        Anesthesia Post Evaluation    Patient location during evaluation: PACU  Patient participation: complete - patient participated  Level of consciousness: awake and alert  Pain score: 0  Airway patency: patent  Nausea & Vomiting: no nausea and no vomiting  Complications: no  Cardiovascular status: blood pressure returned to baseline  Respiratory status: acceptable  Hydration status: euvolemic

## 2021-01-04 NOTE — OP NOTE
[address] [facility]                                OPERATIVE REPORT    PATIENT NAME: Jordana Cerrato                   :        1965  MED REC NO:   [id number]                          ROOM:  ACCOUNT NO:   [de-identified]                           ADMIT DATE: 2021  PROVIDER:     Izabel Garcia MD    DATE OF PROCEDURE:  2021    PRIMARY CARE PHYSICIAN:  Zunilda Nguyen MD    PREOPERATIVE DIAGNOSIS:  Left distal radius three-part intra-articular  displaced fracture. POSTOPERATIVE DIAGNOSIS:  Left distal radius three-part intra-articular  displaced fracture. OPERATION PERFORMED:  Open treatment of left distal radius three-part  intra-articular fracture with open reduction and internal fixation. SURGEON:  Izabel Garcia MD    ASSISTANT:  Lizbet Figueroa CNP    ANESTHESIA:  General anesthesia. ESTIMATED BLOOD LOSS:  Minimal.    COMPLICATIONS:  None. TOURNIQUET:  Left upper arm, 250 mmHg. IMPLANT USED:  [place] titanium intermediate volar locking plate with a  total of seven distal 2.7 locking screws and two proximal screws. INDICATIONS:  This is a 49-year-old white female, left-hand dominant,  who sustained a fall on her left wrist using a hoverboard. She  sustained a distal radius intra-articular displaced fracture. She was  seen at [facility]. All risks, benefits and alternatives were  discussed with the patient. She agreed to proceed with the surgical  treatment. OPERATIVE PROCEDURE:  The patient's left wrist was marked. She received  2 gm Ancef IV preoperatively. The patient was then brought to the  operating room and underwent general anesthesia. A well-padded  tourniquet was placed, left upper arm. The left upper extremity was  then prepped and draped in regular sterile routine fashion. A time-out  was called confirming the patient name, site and procedure. Esmarch was used for exsanguination and tourniquet was inflated to 250  mmHg. A volar approach was performed. An incision was made over the  FCR tendon and tendon sheath was opened. At this point, we incised the  pronator quadratus muscle with a cautery. We exposed the fracture and  was found to be a three-part intra-articular fracture. We were able to  carefully reduce the fracture and secured with a styloid process K-wire. While maintaining the reduction, we put the plate in appropriate  position. We also used the tenaculum bone clamp to keep the reduction in place  given this instability of the fracture. At this point, we went ahead  and put one screw in the oblong hole, and then we reduced the fracture  to the plate and locked it with a total of seven distal 2.7 locking  screws. We added one more locking screw in the shaft. Overall, we were  very satisfied with the anatomic reduction and position of all the  screws. At this point, we went ahead and let the tourniquet down and hemostasis  was secured. We irrigated the incision copiously with normal saline. We closed the subcu with a 3-0 Vicryl and the skin with a 4-0 Monocryl. Steri-Strips were then applied. Dressing was then applied in the form  of Xeroform, 4x4, sterile Webril and a volar splint was applied. The patient tolerated the procedure well and was taken to recovery in  stable condition. Ernie Menendez CNP was 1st Assist given the nature of the procedure that needed advanced assistance. POSTOPERATIVE PLAN:  The patient will be discharged home. She will be  nonweightbearing, but she can start range of motion of her fingers  immediately and range of motion of the wrist in two weeks. Miko Mcbride MD    D: 01/04/2021 12:59:16       T: 01/04/2021 13:31:39     [id number]  Job#: [id number]     Doc#: [id number]    CC:   Dwayne Morales MD

## 2021-01-04 NOTE — ANESTHESIA PRE PROCEDURE
Cardiologist said not to take it\"    Pregabalin Other (See Comments)     Pt unable to recall reaction    Sulfamethoxazole-Trimethoprim Rash       Problem List:    Patient Active Problem List   Diagnosis Code    Heart valve replaced Z95.2       Past Medical History:        Diagnosis Date    Anxiety and depression     Aortic valve disease 2016    Arthritis     History of blood transfusion     Pacemaker     Medtronic       Past Surgical History:        Procedure Laterality Date    BACK SURGERY      rods/screw L4,5,6/ DJD    CARDIAC SURGERY  2017    aortic valve replacement and hole in heart    NECK SURGERY      screws and pins    PACEMAKER PLACEMENT  2017    medtronic model # [id number]    SINUS SURGERY         Social History:    Social History     Tobacco Use    Smoking status: Never Smoker    Smokeless tobacco: Never Used   Substance Use Topics    Alcohol use: Yes     Comment: occ                                Counseling given: Not Answered      Vital Signs (Current):   Vitals:    12/31/20 0941 01/04/21 0809 01/04/21 0811   BP:   131/72   Pulse:   55   Resp:   17   Temp:   97.2 °F (36.2 °C)   TempSrc:   Temporal   SpO2:   96%   Weight: 145 lb (65.8 kg) 145 lb (65.8 kg)    Height: 5' 6\" (1.676 m) 5' 6\" (1.676 m)                                               BP Readings from Last 3 Encounters:   01/04/21 131/72   12/31/20 109/73   12/30/20 128/70       NPO Status: Time of last liquid consumption: 2300                        Time of last solid consumption: 2300                        Date of last liquid consumption: 01/03/21                        Date of last solid food consumption: 01/03/21    BMI:   Wt Readings from Last 3 Encounters:   01/04/21 145 lb (65.8 kg)   12/31/20 145 lb (65.8 kg)   12/30/20 145 lb (65.8 kg)     Body mass index is 23.4 kg/m².     CBC: No results found for: WBC, RBC, HGB, HCT, MCV, RDW, PLT    CMP: No results found for: NA, K, CL, CO2, BUN, CREATININE, GFRAA, AGRATIO, LABGLOM, GLUCOSE, PROT, CALCIUM, BILITOT, ALKPHOS, AST, ALT    POC Tests: No results for input(s): POCGLU, POCNA, POCK, POCCL, POCBUN, POCHEMO, POCHCT in the last 72 hours. Coags:   Lab Results   Component Value Date    PROTIME 24.9 04/09/2018    INR 1.2 07/30/2018       HCG (If Applicable): No results found for: PREGTESTUR, PREGSERUM, HCG, HCGQUANT     ABGs: No results found for: PHART, PO2ART, CKO5CFT, WBW6UGN, BEART, P4CGXYMH     Type & Screen (If Applicable):  No results found for: LABABO, LABRH    Drug/Infectious Status (If Applicable):  No results found for: HIV, HEPCAB    COVID-19 Screening (If Applicable): No results found for: COVID19      Anesthesia Evaluation  Patient summary reviewed and Nursing notes reviewed  Airway: Mallampati: II  TM distance: >3 FB   Neck ROM: full  Mouth opening: > = 3 FB Dental: normal exam         Pulmonary:Negative Pulmonary ROS breath sounds clear to auscultation                             Cardiovascular:  Exercise tolerance: good (>4 METS),   (+) valvular problems/murmurs: MR, pacemaker:,     (-) hypertension, past MI, CAD, CABG/stent, dysrhythmias,  angina,  CHF, orthopnea, PND and  LR      Rhythm: regular                   ROS comment: status post mitral valve replacement, ASD repair, and pacemaker implantation in 2016     Neuro/Psych:   (+) psychiatric history:             ROS comment: Chronic neck and back pain GI/Hepatic/Renal: Neg GI/Hepatic/Renal ROS            Endo/Other:    (+) : arthritis:., no malignancy/cancer. (-) diabetes mellitus, hypothyroidism, hyperthyroidism, blood dyscrasia, no electrolyte abnormalities, no malignancy/cancer               Abdominal:           Vascular:                                        Anesthesia Plan      general     ASA 3       Induction: intravenous. MIPS: Postoperative opioids intended and Prophylactic antiemetics administered. Anesthetic plan and risks discussed with patient.       Plan discussed with Aminta Perez MD   1/4/2021    This pre-anesthesia assessment may be used as a history and physical.    DOS STAFF ADDENDUM:    Pt seen and examined, chart reviewed (including anesthesia, drug and allergy history). No interval changes to history and physical examination. Anesthetic plan, risks, benefits, alternatives, and personnel involved discussed with patient. Patient verbalized an understanding and agrees to proceed.       Sky Peña MD  January 4, 2021  8:34 AM

## 2021-01-04 NOTE — ANESTHESIA POSTPROCEDURE EVALUATION
[facility] Department of Anesthesiology  Post-Anesthesia Note       Name:  Cindi Wheat                                  Age:  54 y.o. MRN:  [id number]     Last Vitals & Oxygen Saturation: /82   Pulse 93   Temp 97.1 °F (36.2 °C) (Temporal)   Resp 18   Ht 5' 6\" (1.676 m)   Wt 145 lb (65.8 kg)   SpO2 98%   BMI 23.40 kg/m²   No data found. Level of consciousness:  Awake, alert    Respiratory: Respirations easy, no distress. Stable. Cardiovascular: Hemodynamically stable. Hydration: Adequate. PONV: Adequately managed. Post-op pain: Adequately controlled. Post-op assessment: Tolerated anesthetic well without complication. Complications:  None.     Yarely Jennings MD  January 4, 2021   4:23 PM

## 2021-01-08 ENCOUNTER — TELEPHONE (OUTPATIENT)
Dept: ORTHOPEDIC SURGERY | Age: 56
End: 2021-01-08

## 2021-01-08 NOTE — TELEPHONE ENCOUNTER
General Question     Subject: She would like a call back bc her L wrist \"feels weird\"    Patient and /or Facility Request: patient    Contact Number: [phone]

## 2021-01-12 ENCOUNTER — OFFICE VISIT (OUTPATIENT)
Dept: ORTHOPEDIC SURGERY | Age: 56
End: 2021-01-12
Payer: MEDICARE

## 2021-01-12 VITALS — HEIGHT: 66 IN | BODY MASS INDEX: 23.3 KG/M2 | WEIGHT: 145 LBS

## 2021-01-12 DIAGNOSIS — S52.502A CLOSED FRACTURE OF DISTAL END OF LEFT RADIUS, UNSPECIFIED FRACTURE MORPHOLOGY, INITIAL ENCOUNTER: Primary | ICD-10-CM

## 2021-01-12 PROCEDURE — 99024 POSTOP FOLLOW-UP VISIT: CPT | Performed by: ORTHOPAEDIC SURGERY

## 2021-01-12 PROCEDURE — L3908 WHO COCK-UP NONMOLDE PRE OTS: HCPCS | Performed by: ORTHOPAEDIC SURGERY

## 2021-01-16 NOTE — PROGRESS NOTES
DIAGNOSIS:  Left distal radius 3 parts intra-articular displaced fracture, status post ORIF. DATE OF SURGERY:  1/4/2021. HISTORY OF PRESENT ILLNESS:  Ms. Aron Saavedra 54 y.o.  female left handed returns today  for 2 weeks postoperative visit. The patient denies any significant pain in the left wrist. Rates pain a 6/10 VAS mild, aching, No numbness or tingling sensation. No fever or Chills. She  is in a splint. PHYSICAL EXAMINATION:  The incision is completely healed . No signs of any erythema or drainage. She  has no pain with the active or passive range of motion of the left wrist, but decrease ROM. She  has intact sensation, distally, and she  is neurovascularly intact. IMAGING:  Three views left wrist taken today in the office showed anatomic alignment of left distal radius, plate and screws in good position, no loosening. IMPRESSION:  2 weeks out from left distal radius ORIF and doing very well. PLAN:  I have told the patient to work on ROM, as well as strengthening exercises. A removable forearm brace applied. No heavy impact activities. The patient will come back for a follow up in 6 weeks. At that time, we will take 3 views of the left wrist. PT if needed then. As this patient has demonstrated risk factors for osteoporosis, such as age and evidence of a fracture, I have referred the patient back to the primary care physician for evaluation for osteoporosis, including consideration for DEXA scanning, if this is felt to be clinically indicated. The patient is advised to contact the primary care physician to follow-up for further evaluation. Procedures    Jacqueline Bailey Titan Wrist Long Forearm Brace     Patient was prescribed a Jacqueline Bailey Titan Wrist and Forearm Brace. The left wrist will require stabilization / immobilization from this semi-rigid / rigid orthosis to improve their function.   The orthosis will assist in protecting the affected area, provide functional support and facilitate healing. The patient was educated and fit by a healthcare professional with expert knowledge and specialization in brace application while under the direct supervision of the treating physician. Verbal and written instructions for the use of and application of this item were provided. They were instructed to contact the office immediately should the brace result in increased pain, decreased sensation, increased swelling or worsening of the condition.        Klaudia Rios MD

## 2021-05-12 ENCOUNTER — OFFICE VISIT (OUTPATIENT)
Dept: ORTHOPEDIC SURGERY | Age: 56
End: 2021-05-12
Payer: MEDICARE

## 2021-05-12 VITALS — HEIGHT: 66 IN | BODY MASS INDEX: 23.3 KG/M2 | WEIGHT: 145 LBS

## 2021-05-12 DIAGNOSIS — S52.502A CLOSED FRACTURE OF DISTAL END OF LEFT RADIUS, UNSPECIFIED FRACTURE MORPHOLOGY, INITIAL ENCOUNTER: ICD-10-CM

## 2021-05-12 DIAGNOSIS — M25.562 PAIN IN BOTH KNEES, UNSPECIFIED CHRONICITY: ICD-10-CM

## 2021-05-12 DIAGNOSIS — M17.12 PRIMARY OSTEOARTHRITIS OF LEFT KNEE: ICD-10-CM

## 2021-05-12 DIAGNOSIS — M25.561 PAIN IN BOTH KNEES, UNSPECIFIED CHRONICITY: ICD-10-CM

## 2021-05-12 DIAGNOSIS — M17.11 PRIMARY OSTEOARTHRITIS OF RIGHT KNEE: Primary | ICD-10-CM

## 2021-05-12 PROCEDURE — 20610 DRAIN/INJ JOINT/BURSA W/O US: CPT | Performed by: ORTHOPAEDIC SURGERY

## 2021-05-12 PROCEDURE — 99214 OFFICE O/P EST MOD 30 MIN: CPT | Performed by: ORTHOPAEDIC SURGERY

## 2021-05-12 RX ORDER — TRIAMCINOLONE ACETONIDE 40 MG/ML
40 INJECTION, SUSPENSION INTRA-ARTICULAR; INTRAMUSCULAR ONCE
Status: COMPLETED | OUTPATIENT
Start: 2021-05-12 | End: 2021-05-12

## 2021-05-12 RX ORDER — LIDOCAINE HYDROCHLORIDE 10 MG/ML
40 INJECTION, SOLUTION INFILTRATION; PERINEURAL ONCE
Status: COMPLETED | OUTPATIENT
Start: 2021-05-12 | End: 2021-05-12

## 2021-05-12 RX ADMIN — TRIAMCINOLONE ACETONIDE 40 MG: 40 INJECTION, SUSPENSION INTRA-ARTICULAR; INTRAMUSCULAR at 10:03

## 2021-05-12 RX ADMIN — LIDOCAINE HYDROCHLORIDE 40 MG: 10 INJECTION, SOLUTION INFILTRATION; PERINEURAL at 10:03

## 2021-05-12 RX ADMIN — TRIAMCINOLONE ACETONIDE 40 MG: 40 INJECTION, SUSPENSION INTRA-ARTICULAR; INTRAMUSCULAR at 10:02

## 2021-05-12 NOTE — PROGRESS NOTES
in the patient's chart under the Media tab. No change. PHYSICAL EXAMINATION:  Ms. Onel Logan is a very pleasant 54 y.o.  female who presents today in no acute distress, awake, alert, and oriented. She is well dressed, nourished and  groomed. Patient with normal affect. Height is  5' 6\" (1.676 m), weight is 145 lb (65.8 kg), Body mass index is 23.4 kg/m². Resting respiratory rate is 16. Examination of the gait, showed that the patient walks heel-toe with a non-antalgic gait and no limp. Examination of both knees showing full ROM, bilateral mild crepitus, tenderness on medial joint line, stable to varus and valgus stress. She has intact sensation and good pedal pulses. She has good strength in 2 planes, and has mild tenderness on deep palpation over the medial joint line. Knee reflex 1+ bilaterally. On examination of the left wrist, the incision is completely healed . No signs of any erythema or drainage. She  has no pain with the active or passive range of motion of the left wrist, full ROM. She  has intact sensation, distally, and she  is neurovascularly intact. IMAGING:  Xray 3 views of the bilateral knee was obtained today in the office and reviewed. These demonstrate mild degenerative changes with narrowing of the joint space in the patellar joint space compartment, subchondral sclerosis, and marginal osteophytosis. Xray, 3 views left wrist taken today in the office showed anatomic alignment of left distal radius, plate and screws in good position, no loosening. IMPRESSION:   1-Bilateral knee DJD.  2-3 months out from left distal radius ORIF and doing very well. PLAN: For the knees: I discussed with the patient the treatment options including both surgical and non-surgical treatment. We recommended Quad exercises and stretching of the calf and hamstrings which was taught to the patient today. She will take NSAIDS as needed.  I believe she will benefit from cortisone

## 2021-06-26 ENCOUNTER — APPOINTMENT (OUTPATIENT)
Dept: GENERAL RADIOLOGY | Age: 56
End: 2021-06-26
Payer: MEDICARE

## 2021-06-26 ENCOUNTER — HOSPITAL ENCOUNTER (EMERGENCY)
Age: 56
Discharge: HOME | End: 2021-06-27
Attending: EMERGENCY MEDICINE
Payer: MEDICARE

## 2021-06-26 DIAGNOSIS — R05.9 COUGH: Primary | ICD-10-CM

## 2021-06-26 LAB
A/G RATIO: 1.9 (ref 1.1–2.2)
ALBUMIN SERPL-MCNC: 4.2 G/DL (ref 3.4–5)
ALP BLD-CCNC: 87 U/L (ref 40–129)
ALT SERPL-CCNC: 21 U/L (ref 10–40)
ANION GAP SERPL CALCULATED.3IONS-SCNC: 11 MMOL/L (ref 3–16)
AST SERPL-CCNC: 27 U/L (ref 15–37)
BASOPHILS ABSOLUTE: 0 K/UL (ref 0–0.2)
BASOPHILS RELATIVE PERCENT: 0.3 %
BILIRUB SERPL-MCNC: 0.3 MG/DL (ref 0–1)
BUN BLDV-MCNC: 11 MG/DL (ref 7–20)
CALCIUM SERPL-MCNC: 8.9 MG/DL (ref 8.3–10.6)
CHLORIDE BLD-SCNC: 102 MMOL/L (ref 99–110)
CO2: 24 MMOL/L (ref 21–32)
CREAT SERPL-MCNC: 0.9 MG/DL (ref 0.6–1.1)
EOSINOPHILS ABSOLUTE: 0 K/UL (ref 0–0.6)
EOSINOPHILS RELATIVE PERCENT: 0.1 %
GFR AFRICAN AMERICAN: >60
GFR NON-AFRICAN AMERICAN: >60
GLOBULIN: 2.2 G/DL
GLUCOSE BLD-MCNC: 148 MG/DL (ref 70–99)
HCT VFR BLD CALC: 38 % (ref 36–48)
HEMOGLOBIN: 12.5 G/DL (ref 12–16)
LYMPHOCYTES ABSOLUTE: 0.8 K/UL (ref 1–5.1)
LYMPHOCYTES RELATIVE PERCENT: 17.8 %
MAGNESIUM: 1.7 MG/DL (ref 1.8–2.4)
MCH RBC QN AUTO: 29.9 PG (ref 26–34)
MCHC RBC AUTO-ENTMCNC: 32.9 G/DL (ref 31–36)
MCV RBC AUTO: 90.9 FL (ref 80–100)
MONOCYTES ABSOLUTE: 0.5 K/UL (ref 0–1.3)
MONOCYTES RELATIVE PERCENT: 12.7 %
NEUTROPHILS ABSOLUTE: 2.9 K/UL (ref 1.7–7.7)
NEUTROPHILS RELATIVE PERCENT: 69.1 %
PDW BLD-RTO: 14.4 % (ref 12.4–15.4)
PLATELET # BLD: 178 K/UL (ref 135–450)
PMV BLD AUTO: 8.5 FL (ref 5–10.5)
POTASSIUM REFLEX MAGNESIUM: 3.5 MMOL/L (ref 3.5–5.1)
RBC # BLD: 4.18 M/UL (ref 4–5.2)
SODIUM BLD-SCNC: 137 MMOL/L (ref 136–145)
TOTAL PROTEIN: 6.4 G/DL (ref 6.4–8.2)
WBC # BLD: 4.2 K/UL (ref 4–11)

## 2021-06-26 PROCEDURE — 96374 THER/PROPH/DIAG INJ IV PUSH: CPT

## 2021-06-26 PROCEDURE — 71045 X-RAY EXAM CHEST 1 VIEW: CPT

## 2021-06-26 PROCEDURE — 2580000003 HC RX 258: Performed by: EMERGENCY MEDICINE

## 2021-06-26 PROCEDURE — 36415 COLL VENOUS BLD VENIPUNCTURE: CPT

## 2021-06-26 PROCEDURE — 6360000002 HC RX W HCPCS: Performed by: EMERGENCY MEDICINE

## 2021-06-26 PROCEDURE — 99285 EMERGENCY DEPT VISIT HI MDM: CPT

## 2021-06-26 PROCEDURE — 85025 COMPLETE CBC W/AUTO DIFF WBC: CPT

## 2021-06-26 PROCEDURE — 83735 ASSAY OF MAGNESIUM: CPT

## 2021-06-26 PROCEDURE — 80053 COMPREHEN METABOLIC PANEL: CPT

## 2021-06-26 PROCEDURE — 6370000000 HC RX 637 (ALT 250 FOR IP): Performed by: EMERGENCY MEDICINE

## 2021-06-26 RX ORDER — BENZONATATE 100 MG/1
100 CAPSULE ORAL ONCE
Status: DISCONTINUED | OUTPATIENT
Start: 2021-06-27 | End: 2021-06-27 | Stop reason: HOSPADM

## 2021-06-26 RX ORDER — ACETAMINOPHEN 325 MG/1
650 TABLET ORAL
Status: DISCONTINUED | OUTPATIENT
Start: 2021-06-26 | End: 2021-06-26

## 2021-06-26 RX ORDER — BENZONATATE 100 MG/1
100 CAPSULE ORAL ONCE
Status: COMPLETED | OUTPATIENT
Start: 2021-06-26 | End: 2021-06-26

## 2021-06-26 RX ORDER — ACETAMINOPHEN 500 MG
1000 TABLET ORAL ONCE
Status: COMPLETED | OUTPATIENT
Start: 2021-06-26 | End: 2021-06-26

## 2021-06-26 RX ORDER — 0.9 % SODIUM CHLORIDE 0.9 %
1000 INTRAVENOUS SOLUTION INTRAVENOUS ONCE
Status: COMPLETED | OUTPATIENT
Start: 2021-06-26 | End: 2021-06-26

## 2021-06-26 RX ORDER — ONDANSETRON 2 MG/ML
4 INJECTION INTRAMUSCULAR; INTRAVENOUS ONCE
Status: COMPLETED | OUTPATIENT
Start: 2021-06-26 | End: 2021-06-26

## 2021-06-26 RX ADMIN — SODIUM CHLORIDE 1000 ML: 9 INJECTION, SOLUTION INTRAVENOUS at 21:00

## 2021-06-26 RX ADMIN — ONDANSETRON HYDROCHLORIDE 4 MG: 2 INJECTION, SOLUTION INTRAMUSCULAR; INTRAVENOUS at 21:00

## 2021-06-26 RX ADMIN — BENZONATATE 100 MG: 100 CAPSULE ORAL at 23:02

## 2021-06-26 RX ADMIN — SODIUM CHLORIDE 1000 ML: 9 INJECTION, SOLUTION INTRAVENOUS at 21:56

## 2021-06-26 RX ADMIN — ACETAMINOPHEN 1000 MG: 500 TABLET ORAL at 23:18

## 2021-06-26 ASSESSMENT — PAIN SCALES - GENERAL
PAINLEVEL_OUTOF10: 8
PAINLEVEL_OUTOF10: 7

## 2021-06-26 ASSESSMENT — PAIN DESCRIPTION - DESCRIPTORS: DESCRIPTORS: ACHING;TENDER

## 2021-06-26 ASSESSMENT — PAIN DESCRIPTION - PAIN TYPE: TYPE: ACUTE PAIN

## 2021-06-26 ASSESSMENT — PAIN DESCRIPTION - ONSET: ONSET: ON-GOING

## 2021-06-26 ASSESSMENT — PAIN DESCRIPTION - FREQUENCY: FREQUENCY: INTERMITTENT

## 2021-06-26 ASSESSMENT — PAIN DESCRIPTION - PROGRESSION: CLINICAL_PROGRESSION: GRADUALLY WORSENING

## 2021-06-26 ASSESSMENT — PAIN DESCRIPTION - LOCATION: LOCATION: GENERALIZED

## 2021-06-27 VITALS
OXYGEN SATURATION: 100 % | HEIGHT: 66 IN | SYSTOLIC BLOOD PRESSURE: 93 MMHG | DIASTOLIC BLOOD PRESSURE: 71 MMHG | WEIGHT: 147 LBS | TEMPERATURE: 98.3 F | HEART RATE: 71 BPM | RESPIRATION RATE: 16 BRPM | BODY MASS INDEX: 23.63 KG/M2

## 2021-06-27 RX ORDER — BENZONATATE 100 MG/1
100 CAPSULE ORAL 2 TIMES DAILY PRN
Qty: 20 CAPSULE | Refills: 0 | Status: ON HOLD | OUTPATIENT
Start: 2021-06-27 | End: 2021-07-01 | Stop reason: SDUPTHER

## 2021-06-27 ASSESSMENT — PAIN DESCRIPTION - PAIN TYPE: TYPE: CHRONIC PAIN;ACUTE PAIN

## 2021-06-27 ASSESSMENT — PAIN SCALES - GENERAL: PAINLEVEL_OUTOF10: 5

## 2021-06-27 ASSESSMENT — PAIN DESCRIPTION - LOCATION: LOCATION: BACK

## 2021-06-27 NOTE — PROGRESS NOTES
Pt is rating back pain an 8, dr Winston Montenegro verbal order with repeat back, pt can have 2 tabs- 325mg Tylenol

## 2021-06-27 NOTE — ED PROVIDER NOTES
PACEMAKER PLACEMENT  2017    medtronic model # [id number]    SINUS SURGERY         I have reviewed the following from the nursing documentation. Family History   Problem Relation Age of Onset    No Known Problems Mother     Heart Attack Father      Social History     Socioeconomic History    Marital status:      Spouse name: Not on file    Number of children: Not on file    Years of education: Not on file    Highest education level: Not on file   Occupational History    Not on file   Tobacco Use    Smoking status: Never Smoker    Smokeless tobacco: Never Used   Vaping Use    Vaping Use: Never used   Substance and Sexual Activity    Alcohol use: Yes     Comment: occ    Drug use: Never    Sexual activity: Yes     Partners: Male   Other Topics Concern    Not on file   Social History Narrative    Not on file     Social Determinants of Health     Financial Resource Strain:     Difficulty of Paying Living Expenses:    Food Insecurity:     Worried About Running Out of Food in the Last Year:     Ran Out of Food in the Last Year:    Transportation Needs:     Lack of Transportation (Medical):      Lack of Transportation (Non-Medical):    Physical Activity:     Days of Exercise per Week:     Minutes of Exercise per Session:    Stress:     Feeling of Stress :    Social Connections:     Frequency of Communication with Friends and Family:     Frequency of Social Gatherings with Friends and Family:     Attends Hoahaoism Services:     Active Member of Clubs or Organizations:     Attends Club or Organization Meetings:     Marital Status:    Intimate Partner Violence:     Fear of Current or Ex-Partner:     Emotionally Abused:     Physically Abused:     Sexually Abused:      Current Facility-Administered Medications   Medication Dose Route Frequency Provider Last Rate Last Admin    0.9 % sodium chloride bolus  1,000 mL Intravenous Once Waleska Brown MD        ondansetron [facility] injection 4 Non-distended. Non-tender. No masses. No organomegaly. No guarding or rebound. EXTREMITIES: No peripheral edema. Moves all extremities equally. All extremities neurovascularly intact. SKIN: Warm and dry. No acute rashes. NEUROLOGICAL: Alert and oriented. Strength 5/5, sensation intact. Gait normal.   PSYCHIATRIC: Normal mood and affect. No HI or SI expressed to me. RADIOLOGY    If acquired see below     EKG:     If acquired see below       ED COURSE/MDM        ED Course as of Jun 27 0606   Sat Jun 26, 2021 2117 Magnesium of 1.7   Magnesium(!):    Magnesium 1.70(!) [DL]   2117 CBC reveals a white count of 4.2 and H&H of 12 and 38 with a platelet count of 243   CBC Auto Differential(!):    WBC 4.2   RBC 4.18   Hemoglobin Quant 12.5   Hematocrit 38.0   MCV 90.9   MCH 29.9   MCHC 32.9   RDW 14.4   Platelet Count 471   MPV 8.5   Neutrophils % 69.1   Lymphocyte % 17.8   Monocytes % 12.7   Eosinophils % 0.1   Basophils % 0.3   Neutrophils Absolute 2.9   Lymphocytes Absolute 0.8(!)   Monocytes Absolute 0.5   Eosinophils Absolute 0.0   Basophils Absolute 0.0 [DL]   2117 Comprehensive metabolic panel was normal except for glucose of 148   Comprehensive Metabolic Panel w/ Reflex to MG(!):    Sodium 137   Potassium 3.5   Chloride 102   CO2 24   Anion Gap 11   Glucose 148(!)   BUN 11   Creatinine 0.9   GFR Non- >60   GFR African American >60   Calcium 8.9   Total Protein 6.4   Albumin 4.2   Albumin/Globulin Ratio 1.9   Bilirubin 0.3   Alk Phos 87   ALT 21   AST 27   Globulin 2.2 [DL]   2151 IMPRESSION:  Stable chronic changes with no acute abnormality seen.      XR CHEST PORTABLE [DL]      ED Course User Index  [DL] Mary Ann Goode MD       The ED course and plan were reviewed and results discussed with the patient    The patient understood and agreed with the Discharge/transfer planning.     CLINICAL IMPRESSION and DISPOSITION    William Salazar was stable and diagnosed with cough       Mariama Chill Shayna Jacobs MD  06/27/21 9110

## 2021-06-30 ENCOUNTER — HOSPITAL ENCOUNTER (OUTPATIENT)
Age: 56
Setting detail: OBSERVATION
Discharge: HOME | DRG: 177 | End: 2021-07-01
Attending: EMERGENCY MEDICINE | Admitting: INTERNAL MEDICINE
Payer: MEDICARE

## 2021-06-30 ENCOUNTER — APPOINTMENT (OUTPATIENT)
Dept: CT IMAGING | Age: 56
DRG: 177 | End: 2021-06-30
Payer: MEDICARE

## 2021-06-30 ENCOUNTER — APPOINTMENT (OUTPATIENT)
Dept: GENERAL RADIOLOGY | Age: 56
DRG: 177 | End: 2021-06-30
Payer: MEDICARE

## 2021-06-30 DIAGNOSIS — U07.1 COVID-19: Primary | ICD-10-CM

## 2021-06-30 DIAGNOSIS — R11.2 NAUSEA VOMITING AND DIARRHEA: ICD-10-CM

## 2021-06-30 DIAGNOSIS — R19.7 NAUSEA VOMITING AND DIARRHEA: ICD-10-CM

## 2021-06-30 DIAGNOSIS — R05.9 COUGH: ICD-10-CM

## 2021-06-30 DIAGNOSIS — R06.02 SHORTNESS OF BREATH: ICD-10-CM

## 2021-06-30 PROBLEM — E86.0 DEHYDRATION: Status: ACTIVE | Noted: 2021-06-30

## 2021-06-30 PROBLEM — E87.6 HYPOKALEMIA: Status: ACTIVE | Noted: 2021-06-30

## 2021-06-30 LAB
A/G RATIO: 1.3 (ref 1.1–2.2)
A/G RATIO: 1.4 (ref 1.1–2.2)
ALBUMIN SERPL-MCNC: 3 G/DL (ref 3.4–5)
ALBUMIN SERPL-MCNC: 3.6 G/DL (ref 3.4–5)
ALP BLD-CCNC: 107 U/L (ref 40–129)
ALP BLD-CCNC: 114 U/L (ref 40–129)
ALT SERPL-CCNC: 16 U/L (ref 10–40)
ALT SERPL-CCNC: 19 U/L (ref 10–40)
ANION GAP SERPL CALCULATED.3IONS-SCNC: 11 MMOL/L (ref 3–16)
ANION GAP SERPL CALCULATED.3IONS-SCNC: 9 MMOL/L (ref 3–16)
AST SERPL-CCNC: 41 U/L (ref 15–37)
AST SERPL-CCNC: 42 U/L (ref 15–37)
BASOPHILS ABSOLUTE: 0 K/UL (ref 0–0.2)
BASOPHILS ABSOLUTE: 0 K/UL (ref 0–0.2)
BASOPHILS RELATIVE PERCENT: 0.1 %
BASOPHILS RELATIVE PERCENT: 0.3 %
BILIRUB SERPL-MCNC: 0.3 MG/DL (ref 0–1)
BILIRUB SERPL-MCNC: 0.3 MG/DL (ref 0–1)
BUN BLDV-MCNC: 5 MG/DL (ref 7–20)
BUN BLDV-MCNC: 6 MG/DL (ref 7–20)
CALCIUM SERPL-MCNC: 6.9 MG/DL (ref 8.3–10.6)
CALCIUM SERPL-MCNC: 8.1 MG/DL (ref 8.3–10.6)
CHLORIDE BLD-SCNC: 101 MMOL/L (ref 99–110)
CHLORIDE BLD-SCNC: 102 MMOL/L (ref 99–110)
CO2: 23 MMOL/L (ref 21–32)
CO2: 25 MMOL/L (ref 21–32)
CREAT SERPL-MCNC: 0.6 MG/DL (ref 0.6–1.1)
CREAT SERPL-MCNC: 0.6 MG/DL (ref 0.6–1.1)
D DIMER: 218 NG/ML DDU (ref 0–229)
EKG ATRIAL RATE: 79 BPM
EKG DIAGNOSIS: NORMAL
EKG P AXIS: 77 DEGREES
EKG P-R INTERVAL: 158 MS
EKG Q-T INTERVAL: 432 MS
EKG QRS DURATION: 154 MS
EKG QTC CALCULATION (BAZETT): 495 MS
EKG R AXIS: -68 DEGREES
EKG T AXIS: 89 DEGREES
EKG VENTRICULAR RATE: 79 BPM
EOSINOPHILS ABSOLUTE: 0 K/UL (ref 0–0.6)
EOSINOPHILS ABSOLUTE: 0 K/UL (ref 0–0.6)
EOSINOPHILS RELATIVE PERCENT: 0 %
EOSINOPHILS RELATIVE PERCENT: 0 %
GFR AFRICAN AMERICAN: >60
GFR AFRICAN AMERICAN: >60
GFR NON-AFRICAN AMERICAN: >60
GFR NON-AFRICAN AMERICAN: >60
GLOBULIN: 2.3 G/DL
GLOBULIN: 2.6 G/DL
GLUCOSE BLD-MCNC: 115 MG/DL (ref 70–99)
GLUCOSE BLD-MCNC: 130 MG/DL (ref 70–99)
HCT VFR BLD CALC: 30.4 % (ref 36–48)
HCT VFR BLD CALC: 34.6 % (ref 36–48)
HEMOGLOBIN: 10.5 G/DL (ref 12–16)
HEMOGLOBIN: 11.7 G/DL (ref 12–16)
LACTIC ACID, SEPSIS: 1.5 MMOL/L (ref 0.4–1.9)
LYMPHOCYTES ABSOLUTE: 0.4 K/UL (ref 1–5.1)
LYMPHOCYTES ABSOLUTE: 0.5 K/UL (ref 1–5.1)
LYMPHOCYTES RELATIVE PERCENT: 11.6 %
LYMPHOCYTES RELATIVE PERCENT: 7.7 %
MAGNESIUM: 1.6 MG/DL (ref 1.8–2.4)
MAGNESIUM: 2 MG/DL (ref 1.8–2.4)
MCH RBC QN AUTO: 30.2 PG (ref 26–34)
MCH RBC QN AUTO: 31.1 PG (ref 26–34)
MCHC RBC AUTO-ENTMCNC: 33.7 G/DL (ref 31–36)
MCHC RBC AUTO-ENTMCNC: 34.6 G/DL (ref 31–36)
MCV RBC AUTO: 89.6 FL (ref 80–100)
MCV RBC AUTO: 89.9 FL (ref 80–100)
MONOCYTES ABSOLUTE: 0.1 K/UL (ref 0–1.3)
MONOCYTES ABSOLUTE: 0.1 K/UL (ref 0–1.3)
MONOCYTES RELATIVE PERCENT: 2 %
MONOCYTES RELATIVE PERCENT: 2.3 %
NEUTROPHILS ABSOLUTE: 3.8 K/UL (ref 1.7–7.7)
NEUTROPHILS ABSOLUTE: 4.3 K/UL (ref 1.7–7.7)
NEUTROPHILS RELATIVE PERCENT: 86 %
NEUTROPHILS RELATIVE PERCENT: 90 %
PDW BLD-RTO: 14.6 % (ref 12.4–15.4)
PDW BLD-RTO: 14.7 % (ref 12.4–15.4)
PLATELET # BLD: 125 K/UL (ref 135–450)
PLATELET # BLD: 133 K/UL (ref 135–450)
PMV BLD AUTO: 8.5 FL (ref 5–10.5)
PMV BLD AUTO: 8.7 FL (ref 5–10.5)
POTASSIUM REFLEX MAGNESIUM: 2.8 MMOL/L (ref 3.5–5.1)
POTASSIUM REFLEX MAGNESIUM: 3 MMOL/L (ref 3.5–5.1)
RBC # BLD: 3.38 M/UL (ref 4–5.2)
RBC # BLD: 3.86 M/UL (ref 4–5.2)
SARS-COV-2, NAAT: DETECTED
SODIUM BLD-SCNC: 134 MMOL/L (ref 136–145)
SODIUM BLD-SCNC: 137 MMOL/L (ref 136–145)
TOTAL PROTEIN: 5.3 G/DL (ref 6.4–8.2)
TOTAL PROTEIN: 6.2 G/DL (ref 6.4–8.2)
TROPONIN: <0.01 NG/ML
WBC # BLD: 4.4 K/UL (ref 4–11)
WBC # BLD: 4.7 K/UL (ref 4–11)

## 2021-06-30 PROCEDURE — 93005 ELECTROCARDIOGRAM TRACING: CPT | Performed by: EMERGENCY MEDICINE

## 2021-06-30 PROCEDURE — 2580000003 HC RX 258: Performed by: PHYSICIAN ASSISTANT

## 2021-06-30 PROCEDURE — 6360000002 HC RX W HCPCS: Performed by: PHYSICIAN ASSISTANT

## 2021-06-30 PROCEDURE — G0378 HOSPITAL OBSERVATION PER HR: HCPCS

## 2021-06-30 PROCEDURE — 99219 PR INITIAL OBSERVATION CARE/DAY 50 MINUTES: CPT | Performed by: PHYSICIAN ASSISTANT

## 2021-06-30 PROCEDURE — 83735 ASSAY OF MAGNESIUM: CPT

## 2021-06-30 PROCEDURE — 71260 CT THORAX DX C+: CPT

## 2021-06-30 PROCEDURE — 85025 COMPLETE CBC W/AUTO DIFF WBC: CPT

## 2021-06-30 PROCEDURE — 93010 ELECTROCARDIOGRAM REPORT: CPT | Performed by: INTERNAL MEDICINE

## 2021-06-30 PROCEDURE — 6360000004 HC RX CONTRAST MEDICATION: Performed by: EMERGENCY MEDICINE

## 2021-06-30 PROCEDURE — 71045 X-RAY EXAM CHEST 1 VIEW: CPT

## 2021-06-30 PROCEDURE — 85379 FIBRIN DEGRADATION QUANT: CPT

## 2021-06-30 PROCEDURE — 83605 ASSAY OF LACTIC ACID: CPT

## 2021-06-30 PROCEDURE — 36415 COLL VENOUS BLD VENIPUNCTURE: CPT

## 2021-06-30 PROCEDURE — 96374 THER/PROPH/DIAG INJ IV PUSH: CPT

## 2021-06-30 PROCEDURE — 80053 COMPREHEN METABOLIC PANEL: CPT

## 2021-06-30 PROCEDURE — 96375 TX/PRO/DX INJ NEW DRUG ADDON: CPT

## 2021-06-30 PROCEDURE — 6370000000 HC RX 637 (ALT 250 FOR IP): Performed by: PHYSICIAN ASSISTANT

## 2021-06-30 PROCEDURE — 87635 SARS-COV-2 COVID-19 AMP PRB: CPT

## 2021-06-30 PROCEDURE — 96361 HYDRATE IV INFUSION ADD-ON: CPT

## 2021-06-30 PROCEDURE — 6370000000 HC RX 637 (ALT 250 FOR IP): Performed by: EMERGENCY MEDICINE

## 2021-06-30 PROCEDURE — 84484 ASSAY OF TROPONIN QUANT: CPT

## 2021-06-30 PROCEDURE — 2580000003 HC RX 258: Performed by: EMERGENCY MEDICINE

## 2021-06-30 PROCEDURE — 6360000002 HC RX W HCPCS: Performed by: EMERGENCY MEDICINE

## 2021-06-30 PROCEDURE — 99285 EMERGENCY DEPT VISIT HI MDM: CPT

## 2021-06-30 PROCEDURE — 96372 THER/PROPH/DIAG INJ SC/IM: CPT

## 2021-06-30 RX ORDER — CITALOPRAM 20 MG/1
10 TABLET ORAL DAILY
Status: DISCONTINUED | OUTPATIENT
Start: 2021-07-01 | End: 2021-07-01 | Stop reason: HOSPADM

## 2021-06-30 RX ORDER — POLYETHYLENE GLYCOL 3350 17 G/17G
17 POWDER, FOR SOLUTION ORAL DAILY PRN
Status: DISCONTINUED | OUTPATIENT
Start: 2021-06-30 | End: 2021-07-01 | Stop reason: HOSPADM

## 2021-06-30 RX ORDER — DEXAMETHASONE SODIUM PHOSPHATE 10 MG/ML
10 INJECTION, SOLUTION INTRAMUSCULAR; INTRAVENOUS ONCE
Status: COMPLETED | OUTPATIENT
Start: 2021-06-30 | End: 2021-06-30

## 2021-06-30 RX ORDER — PROMETHAZINE HYDROCHLORIDE AND CODEINE PHOSPHATE 6.25; 1 MG/5ML; MG/5ML
5 SOLUTION ORAL ONCE
Status: COMPLETED | OUTPATIENT
Start: 2021-06-30 | End: 2021-06-30

## 2021-06-30 RX ORDER — ONDANSETRON 4 MG/1
4 TABLET, ORALLY DISINTEGRATING ORAL EVERY 8 HOURS PRN
Status: DISCONTINUED | OUTPATIENT
Start: 2021-06-30 | End: 2021-07-01 | Stop reason: HOSPADM

## 2021-06-30 RX ORDER — SODIUM CHLORIDE 0.9 % (FLUSH) 0.9 %
5-40 SYRINGE (ML) INJECTION PRN
Status: DISCONTINUED | OUTPATIENT
Start: 2021-06-30 | End: 2021-07-01 | Stop reason: HOSPADM

## 2021-06-30 RX ORDER — POTASSIUM CHLORIDE 20 MEQ/1
40 TABLET, EXTENDED RELEASE ORAL ONCE
Status: COMPLETED | OUTPATIENT
Start: 2021-06-30 | End: 2021-06-30

## 2021-06-30 RX ORDER — ACETAMINOPHEN 650 MG/1
650 SUPPOSITORY RECTAL EVERY 6 HOURS PRN
Status: DISCONTINUED | OUTPATIENT
Start: 2021-06-30 | End: 2021-07-01 | Stop reason: HOSPADM

## 2021-06-30 RX ORDER — GUAIFENESIN/DEXTROMETHORPHAN 100-10MG/5
5 SYRUP ORAL EVERY 4 HOURS PRN
Status: DISCONTINUED | OUTPATIENT
Start: 2021-06-30 | End: 2021-07-01 | Stop reason: HOSPADM

## 2021-06-30 RX ORDER — 0.9 % SODIUM CHLORIDE 0.9 %
1000 INTRAVENOUS SOLUTION INTRAVENOUS ONCE
Status: COMPLETED | OUTPATIENT
Start: 2021-06-30 | End: 2021-06-30

## 2021-06-30 RX ORDER — OXYCODONE AND ACETAMINOPHEN 7.5; 325 MG/1; MG/1
1 TABLET ORAL EVERY 4 HOURS PRN
Status: DISCONTINUED | OUTPATIENT
Start: 2021-06-30 | End: 2021-07-01 | Stop reason: HOSPADM

## 2021-06-30 RX ORDER — BENZONATATE 100 MG/1
100 CAPSULE ORAL ONCE
Status: COMPLETED | OUTPATIENT
Start: 2021-06-30 | End: 2021-06-30

## 2021-06-30 RX ORDER — SODIUM CHLORIDE 0.9 % (FLUSH) 0.9 %
5-40 SYRINGE (ML) INJECTION EVERY 12 HOURS SCHEDULED
Status: DISCONTINUED | OUTPATIENT
Start: 2021-06-30 | End: 2021-07-01 | Stop reason: HOSPADM

## 2021-06-30 RX ORDER — ACETAMINOPHEN 325 MG/1
975 TABLET ORAL ONCE
Status: COMPLETED | OUTPATIENT
Start: 2021-06-30 | End: 2021-06-30

## 2021-06-30 RX ORDER — SODIUM CHLORIDE 9 MG/ML
INJECTION, SOLUTION INTRAVENOUS CONTINUOUS
Status: DISCONTINUED | OUTPATIENT
Start: 2021-06-30 | End: 2021-07-01 | Stop reason: HOSPADM

## 2021-06-30 RX ORDER — SODIUM CHLORIDE 9 MG/ML
25 INJECTION, SOLUTION INTRAVENOUS PRN
Status: DISCONTINUED | OUTPATIENT
Start: 2021-06-30 | End: 2021-07-01 | Stop reason: HOSPADM

## 2021-06-30 RX ORDER — ACETAMINOPHEN 325 MG/1
650 TABLET ORAL EVERY 6 HOURS PRN
Status: DISCONTINUED | OUTPATIENT
Start: 2021-06-30 | End: 2021-07-01 | Stop reason: HOSPADM

## 2021-06-30 RX ORDER — ONDANSETRON 2 MG/ML
4 INJECTION INTRAMUSCULAR; INTRAVENOUS
Status: DISCONTINUED | OUTPATIENT
Start: 2021-06-30 | End: 2021-06-30

## 2021-06-30 RX ORDER — ONDANSETRON 2 MG/ML
4 INJECTION INTRAMUSCULAR; INTRAVENOUS EVERY 6 HOURS PRN
Status: DISCONTINUED | OUTPATIENT
Start: 2021-06-30 | End: 2021-07-01 | Stop reason: HOSPADM

## 2021-06-30 RX ORDER — GUAIFENESIN 600 MG/1
600 TABLET, EXTENDED RELEASE ORAL ONCE
Status: COMPLETED | OUTPATIENT
Start: 2021-06-30 | End: 2021-06-30

## 2021-06-30 RX ORDER — VITAMIN B COMPLEX
2000 TABLET ORAL DAILY
Status: DISCONTINUED | OUTPATIENT
Start: 2021-07-01 | End: 2021-07-01 | Stop reason: HOSPADM

## 2021-06-30 RX ORDER — LOPERAMIDE HYDROCHLORIDE 2 MG/1
2 CAPSULE ORAL 4 TIMES DAILY PRN
Status: DISCONTINUED | OUTPATIENT
Start: 2021-06-30 | End: 2021-07-01 | Stop reason: HOSPADM

## 2021-06-30 RX ADMIN — Medication 5 ML: at 12:26

## 2021-06-30 RX ADMIN — SODIUM CHLORIDE: 9 INJECTION, SOLUTION INTRAVENOUS at 18:50

## 2021-06-30 RX ADMIN — ONDANSETRON HYDROCHLORIDE 4 MG: 2 INJECTION, SOLUTION INTRAMUSCULAR; INTRAVENOUS at 20:14

## 2021-06-30 RX ADMIN — BENZONATATE 100 MG: 100 CAPSULE ORAL at 16:26

## 2021-06-30 RX ADMIN — IOPAMIDOL 85 ML: 755 INJECTION, SOLUTION INTRAVENOUS at 13:59

## 2021-06-30 RX ADMIN — OXYCODONE HYDROCHLORIDE AND ACETAMINOPHEN 1 TABLET: 7.5; 325 TABLET ORAL at 20:08

## 2021-06-30 RX ADMIN — ACETAMINOPHEN 975 MG: 325 TABLET ORAL at 13:39

## 2021-06-30 RX ADMIN — ACETAMINOPHEN 650 MG: 325 TABLET ORAL at 18:49

## 2021-06-30 RX ADMIN — POTASSIUM CHLORIDE 40 MEQ: 1500 TABLET, EXTENDED RELEASE ORAL at 20:05

## 2021-06-30 RX ADMIN — ENOXAPARIN SODIUM 30 MG: 30 INJECTION SUBCUTANEOUS at 20:06

## 2021-06-30 RX ADMIN — SODIUM CHLORIDE 1000 ML: 9 INJECTION, SOLUTION INTRAVENOUS at 12:08

## 2021-06-30 RX ADMIN — DEXAMETHASONE SODIUM PHOSPHATE 10 MG: 10 INJECTION, SOLUTION INTRAMUSCULAR; INTRAVENOUS at 15:58

## 2021-06-30 RX ADMIN — GUAIFENESIN AND DEXTROMETHORPHAN 5 ML: 100; 10 SYRUP ORAL at 20:05

## 2021-06-30 RX ADMIN — GUAIFENESIN 600 MG: 600 TABLET, EXTENDED RELEASE ORAL at 12:07

## 2021-06-30 ASSESSMENT — PAIN DESCRIPTION - LOCATION
LOCATION: CHEST
LOCATION: CHEST
LOCATION: OTHER (COMMENT)
LOCATION: BACK
LOCATION: CHEST

## 2021-06-30 ASSESSMENT — PAIN DESCRIPTION - DESCRIPTORS
DESCRIPTORS: ACHING
DESCRIPTORS: SORE

## 2021-06-30 ASSESSMENT — PAIN SCALES - GENERAL
PAINLEVEL_OUTOF10: 8
PAINLEVEL_OUTOF10: 8
PAINLEVEL_OUTOF10: 7
PAINLEVEL_OUTOF10: 8
PAINLEVEL_OUTOF10: 6

## 2021-06-30 ASSESSMENT — PAIN DESCRIPTION - ORIENTATION: ORIENTATION: OTHER (COMMENT)

## 2021-06-30 ASSESSMENT — PAIN DESCRIPTION - FREQUENCY
FREQUENCY: CONTINUOUS

## 2021-06-30 ASSESSMENT — PAIN DESCRIPTION - PAIN TYPE: TYPE: ACUTE PAIN

## 2021-06-30 NOTE — PLAN OF CARE
COVID 19, not hypoxic but unable to tolerate PO 2/2 nausea and vomiting.   Admit to med surg as obs, IVF, symptom management

## 2021-06-30 NOTE — ED TRIAGE NOTES
Tested positive for covid 3+ days ago with symptoms >1 week. Pt stated she has COPD and she's had an increase in SOB, N/V/D. Pt stated \"My lungs hurt. \" Pt has not taken anything for pain/fever at home.

## 2021-06-30 NOTE — ED NOTES
Ambulated pt with pulse ox, SP02 dropped to 93% upon ambulation RAMaria De Jesus Schwartz RN  06/30/21 5856

## 2021-06-30 NOTE — H&P
Hospital Medicine History & Physical      PCP: Sue Cole MD    Date of Admission: 6/30/2021    Date of Service: Pt seen/examined on 6/30/2021     Chief Complaint:    Chief Complaint   Patient presents with    Positive For Covid-19     N/V/D, SOB, hypoxia          History Of Present Illness: The patient is a 54 y.o. female with paroxysmal atrial fibrillation, status post cardiac pacemaker, status post mitral valve replacement, bioprosthetic, mild aortic stenosis, COPD who presented to [facility] ED with complaint of illness 2/2 COVID 19. She did not get vaccinated against COVID 19. She started feeling ill about 5 days ago. She went to [address] [place] [address] and tested positive for COVID-19. Her symptoms include a dry nonproductive cough, fevers, chills, fatigue, nausea, vomiting, diarrhea. She has been unable to tolerate orally secondary to her GI symptoms. She has been coughing so much she cannot sleep. She went to the emergency department 2 days ago and was subsequently discharged home. She returned to the emergency department today as she felt weak and she was unable to tolerate orally. ER work-up revealed that the patient was not hypoxic. CT chest shows findings consistent with COVID-19 pneumonia. She was hypokalemic. She was treated with IV fluids in the emergency department, but due to her inability to tolerate orally she will be admitted for IV fluids and symptom management, observation overnight.     Past Medical History:        Diagnosis Date    Anxiety and depression     Aortic valve disease 2016    Arthritis     History of blood transfusion     Pacemaker     Medtronic       Past Surgical History:        Procedure Laterality Date    BACK SURGERY      rods/screw L4,5,6/ DJD    CARDIAC SURGERY  2017    aortic valve replacement and hole in heart    FOREARM SURGERY Left 1/4/2021    OPEN REDUCTION INTERNAL FIXATION LEFT DISTAL RADIUS performed by Angelica Cruz MD at [place] [address] screws and pins    PACEMAKER PLACEMENT  2017    medtronic model # [id number]    SINUS SURGERY         Medications Prior to Admission:    Prior to Admission medications    Medication Sig Start Date End Date Taking? Authorizing Provider   benzonatate (TESSALON) 100 MG capsule Take 1 capsule by mouth 2 times daily as needed for Cough 6/27/21 7/4/21 Yes Kelly Evans MD   Biotin 5 MG TBDP Take 1 tablet by mouth daily 10/24/20  Yes Historical Provider, MD   Cholecalciferol 50 MCG (2000 UT) CAPS 2,000 Units daily    Yes Historical Provider, MD   citalopram (CELEXA) 10 MG tablet Take 10 mg by mouth daily   Yes Historical Provider, MD   Multiple Vitamins-Minerals (THERAPEUTIC MULTIVITAMIN-MINERALS) tablet Take 1 tablet by mouth daily   Yes Historical Provider, MD   oxyCODONE-acetaminophen (PERCOCET) 7.5-325 MG per tablet Take 1 tablet by mouth every 4 hours as needed for Pain . Historical Provider, MD       Allergies:  Compazine [prochlorperazine maleate], Hydrocodone, Hydrocodone-acetaminophen, Hydrocodone-acetaminophen, Ibuprofen, Pregabalin, and Sulfamethoxazole-trimethoprim    Social History:  The patient currently lives at home alone     TOBACCO:   reports that she has never smoked. She has never used smokeless tobacco.  ETOH:   reports current alcohol use.       Family History:   Positive as follows:        Problem Relation Age of Onset    No Known Problems Mother     Heart Attack Father        REVIEW OF SYSTEMS:       Constitutional: +for fever, myalgias, malaise   HENT: Negative for sore throat   Eyes: Negative for redness   Respiratory: +for dyspnea, cough   Cardiovascular: Negative for chest pain   Gastrointestinal: +for vomiting, diarrhea   Genitourinary: Negative for hematuria   Musculoskeletal: Negative for arthralgias   Skin: Negative for rash   Neurological: Negative for syncope   Hematological: Negative for adenopathy   Psychiatric/Behavorial: Negative for anxiety    PHYSICAL EXAM:    BP 97/62 Pulse 77   Temp 101.8 °F (38.8 °C) (Oral)   Resp 18   Ht 5' 6\" (1.676 m)   Wt 147 lb (66.7 kg)   SpO2 93%   BMI 23.73 kg/m²     Gen: No distress. Alert. Appears ill   Eyes: PERRL. No sclera icterus. No conjunctival injection. ENT: No discharge. Pharynx clear. Neck: No JVD. No Carotid Bruit. Trachea midline. Resp: No accessory muscle use. + bilateral crackles. No wheezes. No rhonchi. CV: Regular rate. Regular rhythm. 2/6 systolic murmur. No rub. No edema. GI: Non-tender. Non-distended. No masses. No organomegaly. Normal bowel sounds. No hernia. Skin: Warm and dry. No nodule on exposed extremities. No rash on exposed extremities. M/S: No cyanosis. No joint deformity. No clubbing. Neuro: Awake. Grossly nonfocal    Psych: Oriented x 3. No anxiety or agitation. CBC:   Recent Labs     06/30/21  1114   WBC 4.4   HGB 11.7*   HCT 34.6*   MCV 89.6   *     BMP:   Recent Labs     06/30/21  1114      K 3.0*      CO2 25   BUN 6*   CREATININE 0.6     LIVER PROFILE:   Recent Labs     06/30/21  1114   AST 42*   ALT 19   BILITOT 0.3   ALKPHOS 114     PT/INR: No results for input(s): PROTIME, INR in the last 72 hours. APTT: No results for input(s): APTT in the last 72 hours. UA:No results for input(s): NITRITE, COLORU, PHUR, LABCAST, WBCUA, RBCUA, MUCUS, TRICHOMONAS, YEAST, BACTERIA, CLARITYU, SPECGRAV, LEUKOCYTESUR, UROBILINOGEN, BILIRUBINUR, BLOODU, GLUCOSEU, AMORPHOUS in the last 72 hours.     Invalid input(s): [facility]       CARDIAC ENZYMES  Recent Labs     06/30/21  1114   TROPONINI <0.01       U/A:    Lab Results   Component Value Date    COLORU Yellow 05/13/2019    CLARITYU SL CLOUDY 05/13/2019    SPECGRAV 1.025 05/13/2019    LEUKOCYTESUR Negative 05/13/2019    BLOODU Negative 05/13/2019    GLUCOSEU Negative 05/13/2019       ABG  No results found for: TKQ5LQE, BEART, B2RDMCQU, PHART, THGBART, MLA7OEH, PO2ART, MLS1KXE    CULTURES  COVID 19, NAAT: POSITIVE     EKG:   Atrial-sensed ventricular-paced rhythm  Abnormal ECG  No previous ECGs available  Confirmed by RAFIA CHU, CHRISSY    RADIOLOGY  CT CHEST PULMONARY EMBOLISM W CONTRAST   Final Result   Negative for acute pulmonary embolism. Bilateral peripheral pulmonary opacities suggesting COVID pneumonia in this   COVID positive patient         XR CHEST PORTABLE   Final Result   Interval development of multifocal bilateral pulmonary infiltrates consistent   with the given history of COVID-19 pneumonia. ASSESSMENT/PLAN:    #COVID 19 infection with viral pneumonia   - symptom onset > 5 days ago. She has not been vaccinated. - not hypoxic, O2 sat 93% with exertion on RA in ER  -She has symptoms of cough, shortness of breath, high fevers, nausea, vomiting, diarrhea. She has evidence of viral PNA on CT chest.  She has been unable to tolerate orally and she was subsequently admitted for IV hydration and symptom control  -IV fluids, antipyretics, antinausea medication, antidiarrheals, cough suppressants as needed  -No indication for steroids or remdesivir  -Monitor symptoms, if remains stable on room air and able to tolerate orally she can likely be discharged in the morning to home     #Hypokalemia  - replaced    #Paroxysmal atrial fibrillation  #cardiac pacemaker in place  #status post mitral valve replacement, bioprosthetic  #mild aortic stenosis  - currently paced rhythm  - she takes no cardiac meds, no AC  - f/w Dr Nash Chawla at [facility]  - no acute issues noted     #COPD  - no AE    #Depression  - cont celexa     DVT Prophylaxis: Lovenox   Diet: ADULT DIET;  Regular  Code Status: Full Code        nAa Valencia PA-C  6/30/2021 6:54 PM

## 2021-06-30 NOTE — ED NOTES
Bed: 18  Expected date:   Expected time:   Means of arrival:   Comments:  KAT Lara, RN  06/30/21 1050

## 2021-06-30 NOTE — PROGRESS NOTES
Patient admitted to room _213___ from ER. Patient oriented to room, call light, bed rails, phone, lights and bathroom. Patient instructed about the schedule of the day including: vital sign frequency, lab draws, possible tests, frequency of MD and staff rounds, daily weights, I &O's and prescribed diet. Bed alarm deferred patient low fall risk and refuses alarm. Telemetry box in place, patient aware of placement and reason. Bed locked, in lowest position, side rails up 2/4, call light within reach. Recliner Assessment:     Patient is able to demonstrate the ability to move from a reclining position to an upright position within the recliner. Bedside Mobility Assessment Tool (BMAT):     Assessment Level 1- Sit and Shake    1. From a semi-reclined position, ask patient to sit up and rotate to a seated position at the side of the bed. Can use the bedrail. 2. Ask patient to reach out and grab your hand and shake making sure patient reaches across his/her midline. Pass- Patient is able to come to a seated position, maintain core strength. Maintains seated balance while reaching across midline. Move on to Assessment Level 2. Assessment Level 2- Stretch and Point   1. With patient in seated position at the side of the bed, have patient place both feet on the floor (or stool) with knees no higher than hips. 2. Ask patient to stretch one leg and straighten the knee, then bend the ankle/flex and point the toes. If appropriate, repeat with the other leg. Pass- Patient is able to demonstrate appropriate quad strength on intended weight bearing limb(s). Move onto Assessment Level 3. Assessment Level 3- Stand   1. Ask patient to elevate off the bed or chair (seated to standing) using an assistive device (cane, bedrail). 2. Patient should be able to raise buttocks off be and hold for a count of five. May repeat once.    Pass- Patient maintains standing stability for at least 5 seconds, proceed to assessment level 4. Assessment Level 4- Walk   1. Ask patient to march in place at bedside. 2. Then ask patient to advance step and return each foot. Some medical conditions may render a patient from stepping backwards, use your best clinical judgement. Pass- Patient demonstrates balance while shifting weight and ability to step, takes independent steps, does not use assistive device patient is MOBILITY LEVEL 4. Mobility Level- 4        4 Eyes Skin Assessment     The patient is being assess for   Admission    I agree that 2 RN's have performed a thorough Head to Toe Skin Assessment on the patient. ALL assessment sites listed below have been assessed. Areas assessed for pressure by both nurses:   [x]   Head, Face, and Ears   [x]   Shoulders, Back, and Chest, Abdomen  [x]   Arms, Elbows, and Hands   [x]   Coccyx, Sacrum, and Ischium  [x]   Legs, Feet, and Heels        Skin Assessed Under all Medical Devices by both nurses:  n/a              All Mepilex Borders were peeled back and area peeked at by both nurses:  No: n/a  Please list where Mepilex Borders are located:  n/a             **SHARE this note so that the co-signing nurse is able to place an eSignature**    Co-signer eSignature: Electronically signed by Neel Mack RN on 6/30/21 at 8:27 PM EDT    Does the Patient have Skin Breakdown related to pressure?   No     (Insert Photo heren/a)         Levar Prevention initiated:  NA   Wound Care Orders initiated:  NA      [facility] nurse consulted for Pressure Injury (Stage 3,4, Unstageable, DTI, NWPT, Complex wounds)and New or Established Ostomies:  NA      Primary Nurse eSignature: Electronically signed by Isabelle Cortes RN on 6/30/21 at 7:03 PM EDT

## 2021-06-30 NOTE — ED PROVIDER NOTES
[address] ED      CHIEF COMPLAINT  Positive For Covid-19 (N/V/D, SOB, hypoxia )       HISTORY OF5 PRESENT ILLNESS  Víctor Palacios is a 54 y.o. female  who presents to the ED complaining of dyspnea. States significant cough. Also n/v/d. No blood noted in emesis or diarrhea but can't keep anything down. Was seen in ED 3d ago and was dehydrated and d/c home. States feels dehydrated already again. + fevers. No abd pain. + chest pain--- feels like tightness with cough. COVID + last week at [facility]. Has not been vaccinated against covid. Feels generally weak and ill in general.  Has been taking cough medication without improvement. Having trouble sleeping/resting due to sxs. No other complaints, modifying factors or associated symptoms. I have reviewed the following from the nursing documentation.     Past Medical History:   Diagnosis Date    Anxiety and depression     Aortic valve disease 2016    Arthritis     History of blood transfusion     Pacemaker     Medtronic     Past Surgical History:   Procedure Laterality Date    BACK SURGERY      rods/screw L4,5,6/ DJD    CARDIAC SURGERY  2017    aortic valve replacement and hole in heart    FOREARM SURGERY Left 1/4/2021    OPEN REDUCTION INTERNAL FIXATION LEFT DISTAL RADIUS performed by Cameron Watson MD at [address] [place] [address] Drive      screws and pins    PACEMAKER PLACEMENT  2017    medtronic model # A2DR01    SINUS SURGERY       Family History   Problem Relation Age of Onset    No Known Problems Mother     Heart Attack Father      Social History     Socioeconomic History    Marital status:      Spouse name: Not on file    Number of children: Not on file    Years of education: Not on file    Highest education level: Not on file   Occupational History    Not on file   Tobacco Use    Smoking status: Never Smoker    Smokeless tobacco: Never Used   Vaping Use    Vaping Use: Never used   Substance and Sexual Activity    Alcohol use: Yes     Comment: occ    Drug use: Never    Sexual activity: Yes     Partners: Male   Other Topics Concern    Not on file   Social History Narrative    Not on file     Social Determinants of Health     Financial Resource Strain:     Difficulty of Paying Living Expenses:    Food Insecurity:     Worried About Running Out of Food in the Last Year:     [address] in the Last Year:    Transportation Needs:     Lack of Transportation (Medical):  Lack of Transportation (Non-Medical):    Physical Activity:     Days of Exercise per Week:     Minutes of Exercise per Session:    Stress:     Feeling of Stress :    Social Connections:     Frequency of Communication with Friends and Family:     Frequency of Social Gatherings with Friends and Family:     Attends Worship Services:     Active Member of Clubs or Organizations:     Attends Club or Organization Meetings:     Marital Status:    Intimate Partner Violence:     Fear of Current or Ex-Partner:     Emotionally Abused:     Physically Abused:     Sexually Abused:      Current Facility-Administered Medications   Medication Dose Route Frequency Provider Last Rate Last Admin    ondansetron (ZOFRAN) injection 4 mg  4 mg Intravenous Q1H PRN Erica Langford MD         Current Outpatient Medications   Medication Sig Dispense Refill    benzonatate (TESSALON) 100 MG capsule Take 1 capsule by mouth 2 times daily as needed for Cough 20 capsule 0    Biotin 5 MG TBDP Take 1 tablet by mouth daily      Cholecalciferol 50 MCG (2000 UT) CAPS 2,000 Units daily       citalopram (CELEXA) 10 MG tablet Take 10 mg by mouth daily      Multiple Vitamins-Minerals (THERAPEUTIC MULTIVITAMIN-MINERALS) tablet Take 1 tablet by mouth daily      oxyCODONE-acetaminophen (PERCOCET) 7.5-325 MG per tablet Take 1 tablet by mouth every 4 hours as needed for Pain .        Allergies   Allergen Reactions    Compazine [Prochlorperazine Maleate] Other (See Comments)     Pt states becomes stiff--eyes roll behind head    Hydrocodone Itching    Hydrocodone-Acetaminophen Other (See Comments)     insomnia    Hydrocodone-Acetaminophen Other (See Comments)     hyperactivity  Other reaction(s): Insomnia  Other reaction(s): Other (See Comments)  Insomnia  Tolerates plain Acetaminophen.  Ibuprofen Other (See Comments)     \"my blood is thin and my Cardiologist said not to take it\"    Pregabalin Other (See Comments)     Pt unable to recall reaction  Other reaction(s): Other (See Comments)  Cloudy headed  Other reaction(s): Other (See Comments)  Pt unable to recall reaction    Sulfamethoxazole-Trimethoprim Rash       REVIEW OF SYSTEMS  10 systems reviewed, pertinent positives per HPI otherwise noted to be negative. PHYSICAL EXAM  BP (!) 120/108   Pulse 96   Temp 100.9 °F (38.3 °C) (Oral)   Resp 18   Ht 5' 6\" (1.676 m)   Wt 147 lb (66.7 kg)   SpO2 96%   BMI 23.73 kg/m²    GENERAL APPEARANCE: Awake and alert. Cooperative. Mild respiratory distress. HENT: Normocephalic. Atraumatic. Mucous membranes are moist.  No drooling or stridor. NECK: Supple. No cervical lymphadenopathy. EYES: PERRL. EOM's grossly intact. HEART/CHEST: RRR. IV/VI systolic murmur. 2+ radial pulses b/l. LUNGS: Respirations mildly labored. CTAB. No noted wheezes, rales or rhonchi. Fair air exchange. Speaking comfortably in full sentences. Dry cough noted while in room. ABDOMEN: No tenderness. Soft. Non-distended. No masses. No organomegaly. No guarding or rebound. MUSCULOSKELETAL: No extremity edema. Compartments soft. No deformity. No tenderness in the extremities. All extremities neurovascularly intact. SKIN: Warm and dry. No acute rashes. NEUROLOGICAL: Alert and oriented. CN's 2-12 intact. No gross facial drooping. Strength 5/5, sensation intact. No gross focal deficits. PSYCHIATRIC: Normal mood and affect. LABS  I have reviewed all labs for this visit.    Results for orders placed or performed during the hospital encounter of 06/30/21   COVID-19, Rapid   Result Value Ref Range    SARS-CoV-2, NAAT DETECTED (AA) Not Detected   CBC Auto Differential   Result Value Ref Range    WBC 4.4 4.0 - 11.0 K/uL    RBC 3.86 (L) 4.00 - 5.20 M/uL    Hemoglobin 11.7 (L) 12.0 - 16.0 g/dL    Hematocrit 34.6 (L) 36.0 - 48.0 %    MCV 89.6 80.0 - 100.0 fL    MCH 30.2 26.0 - 34.0 pg    MCHC 33.7 31.0 - 36.0 g/dL    RDW 14.7 12.4 - 15.4 %    Platelets 326 (L) 182 - 450 K/uL    MPV 8.7 5.0 - 10.5 fL    Neutrophils % 86.0 %    Lymphocytes % 11.6 %    Monocytes % 2.3 %    Eosinophils % 0.0 %    Basophils % 0.1 %    Neutrophils Absolute 3.8 1.7 - 7.7 K/uL    Lymphocytes Absolute 0.5 (L) 1.0 - 5.1 K/uL    Monocytes Absolute 0.1 0.0 - 1.3 K/uL    Eosinophils Absolute 0.0 0.0 - 0.6 K/uL    Basophils Absolute 0.0 0.0 - 0.2 K/uL   Comprehensive Metabolic Panel w/ Reflex to MG   Result Value Ref Range    Sodium 137 136 - 145 mmol/L    Potassium reflex Magnesium 3.0 (L) 3.5 - 5.1 mmol/L    Chloride 101 99 - 110 mmol/L    CO2 25 21 - 32 mmol/L    Anion Gap 11 3 - 16    Glucose 130 (H) 70 - 99 mg/dL    BUN 6 (L) 7 - 20 mg/dL    CREATININE 0.6 0.6 - 1.1 mg/dL    GFR Non-African American >60 >60    GFR African American >60 >60    Calcium 8.1 (L) 8.3 - 10.6 mg/dL    Total Protein 6.2 (L) 6.4 - 8.2 g/dL    Albumin 3.6 3.4 - 5.0 g/dL    Albumin/Globulin Ratio 1.4 1.1 - 2.2    Total Bilirubin 0.3 0.0 - 1.0 mg/dL    Alkaline Phosphatase 114 40 - 129 U/L    ALT 19 10 - 40 U/L    AST 42 (H) 15 - 37 U/L    Globulin 2.6 g/dL   Lactate, Sepsis   Result Value Ref Range    Lactic Acid, Sepsis 1.5 0.4 - 1.9 mmol/L   Troponin   Result Value Ref Range    Troponin <0.01 <0.01 ng/mL   D-Dimer, Quantitative   Result Value Ref Range    D-Dimer, Quant 218 0 - 229 ng/mL DDU   Magnesium   Result Value Ref Range    Magnesium 2.00 1.80 - 2.40 mg/dL   EKG 12 Lead   Result Value Ref Range    Ventricular Rate 79 BPM    Atrial Rate 79 BPM    P-R COVID positive patient       ED COURSE/MDM  Patient seen and evaluated. Old records reviewed. Labs and imaging reviewed and results discussed with patient. Patient presenting for evaluation nausea vomiting diarrhea as well as significant dyspnea and cough associated with recently positive Covid test.  She was given IV Decadron here along with fluids, antitussives and antiemetics. She was noted to be significantly febrile here was given Tylenol for her fever. Her blood pressures have been borderline but has not had persistent hypotension requiring any intervention beyond IV fluids at this point. However, she has a significant history of valve replacement and given her cardiac history as well as borderline blood pressures and generalized ill appearance, I felt that inpatient admission for further observation and management of the patient was most appropriate. I spoke with [facility] with the hospitalist team. We thoroughly discussed the history, physical exam, laboratory and imaging studies, as well as, emergency department course. Based upon that discussion, we've decided to admit Anton Garcia for further observation and evaluation of Purvi Barros Lerma's dyspnea. As I have deemed necessary from their history, physical, and studies, I have considered and evaluated Anton Garcia for the following diagnoses:  ACUTE CORONARY SYNDROME, CHRONIC OBSTRUCTIVE PULMONARY DISEASE, CONGESTIVE HEART FAILURE, PERICARDIAL TAMPONADE, PNEUMONIA, PNEUMOTHORAX, PULMONARY EMBOLISM, SEPSIS, and THORACIC DISSECTION.       During the patient's ED course, the patient was given:  Medications   ondansetron [facility]) injection 4 mg (has no administration in time range)   0.9 % sodium chloride bolus (0 mLs Intravenous Stopped 6/30/21 1346)   guaiFENesin (MUCINEX) extended release tablet 600 mg (600 mg Oral Given 6/30/21 1207)   promethazine-codeine (PHENERGAN with CODEINE) 6.25-10 MG/5ML solution 5 mL (5 mLs Oral Given 6/30/21 1226) acetaminophen (TYLENOL) tablet 975 mg (975 mg Oral Given 6/30/21 1339)   iopamidol (ISOVUE-370) 76 % injection 85 mL (85 mLs Intravenous Given 6/30/21 1359)   dexamethasone (PF) (DECADRON) injection 10 mg (10 mg Intravenous Given 6/30/21 1558)   benzonatate (TESSALON) capsule 100 mg (100 mg Oral Given 6/30/21 1626)        CLINICAL IMPRESSION  1. COVID-19    2. Shortness of breath    3. Cough    4. Nausea vomiting and diarrhea        Blood pressure (!) 120/108, pulse 96, temperature 100.9 °F (38.3 °C), temperature source Oral, resp. rate 18, height 5' 6\" (1.676 m), weight 147 lb (66.7 kg), SpO2 96 %, not currently breastfeeding. DISPOSITION  Michelle Hall was admitted in stable condition. DISCLAIMER: This chart was created using Dragon dictation software. Efforts were made by me to ensure accuracy, however some errors may be present due to limitations of this technology and occasionally words are not transcribed correctly.         Seda Barton MD  06/30/21 6027

## 2021-07-01 VITALS
RESPIRATION RATE: 18 BRPM | SYSTOLIC BLOOD PRESSURE: 105 MMHG | HEART RATE: 80 BPM | TEMPERATURE: 97.1 F | BODY MASS INDEX: 23.63 KG/M2 | HEIGHT: 66 IN | DIASTOLIC BLOOD PRESSURE: 76 MMHG | WEIGHT: 147 LBS | OXYGEN SATURATION: 97 %

## 2021-07-01 LAB
A/G RATIO: 1.3 (ref 1.1–2.2)
ALBUMIN SERPL-MCNC: 3 G/DL (ref 3.4–5)
ALP BLD-CCNC: 103 U/L (ref 40–129)
ALT SERPL-CCNC: 17 U/L (ref 10–40)
ANION GAP SERPL CALCULATED.3IONS-SCNC: 9 MMOL/L (ref 3–16)
AST SERPL-CCNC: 41 U/L (ref 15–37)
BASOPHILS ABSOLUTE: 0 K/UL (ref 0–0.2)
BASOPHILS RELATIVE PERCENT: 0.2 %
BILIRUB SERPL-MCNC: <0.2 MG/DL (ref 0–1)
BUN BLDV-MCNC: 8 MG/DL (ref 7–20)
CALCIUM SERPL-MCNC: 7.1 MG/DL (ref 8.3–10.6)
CHLORIDE BLD-SCNC: 108 MMOL/L (ref 99–110)
CO2: 23 MMOL/L (ref 21–32)
CREAT SERPL-MCNC: <0.5 MG/DL (ref 0.6–1.1)
EOSINOPHILS ABSOLUTE: 0 K/UL (ref 0–0.6)
EOSINOPHILS RELATIVE PERCENT: 0 %
GFR AFRICAN AMERICAN: >60
GFR NON-AFRICAN AMERICAN: >60
GLOBULIN: 2.3 G/DL
GLUCOSE BLD-MCNC: 146 MG/DL (ref 70–99)
HCT VFR BLD CALC: 31.2 % (ref 36–48)
HEMOGLOBIN: 10.7 G/DL (ref 12–16)
LYMPHOCYTES ABSOLUTE: 0.5 K/UL (ref 1–5.1)
LYMPHOCYTES RELATIVE PERCENT: 12.4 %
MCH RBC QN AUTO: 30.8 PG (ref 26–34)
MCHC RBC AUTO-ENTMCNC: 34.2 G/DL (ref 31–36)
MCV RBC AUTO: 89.9 FL (ref 80–100)
MONOCYTES ABSOLUTE: 0.2 K/UL (ref 0–1.3)
MONOCYTES RELATIVE PERCENT: 4.3 %
NEUTROPHILS ABSOLUTE: 3.4 K/UL (ref 1.7–7.7)
NEUTROPHILS RELATIVE PERCENT: 83.1 %
PDW BLD-RTO: 14.6 % (ref 12.4–15.4)
PLATELET # BLD: 134 K/UL (ref 135–450)
PMV BLD AUTO: 8.2 FL (ref 5–10.5)
POTASSIUM REFLEX MAGNESIUM: 3.7 MMOL/L (ref 3.5–5.1)
RBC # BLD: 3.47 M/UL (ref 4–5.2)
SODIUM BLD-SCNC: 140 MMOL/L (ref 136–145)
TOTAL PROTEIN: 5.3 G/DL (ref 6.4–8.2)
WBC # BLD: 4.1 K/UL (ref 4–11)

## 2021-07-01 PROCEDURE — 85025 COMPLETE CBC W/AUTO DIFF WBC: CPT

## 2021-07-01 PROCEDURE — 99217 PR OBSERVATION CARE DISCHARGE MANAGEMENT: CPT | Performed by: INTERNAL MEDICINE

## 2021-07-01 PROCEDURE — 96372 THER/PROPH/DIAG INJ SC/IM: CPT

## 2021-07-01 PROCEDURE — 36415 COLL VENOUS BLD VENIPUNCTURE: CPT

## 2021-07-01 PROCEDURE — 2580000003 HC RX 258: Performed by: PHYSICIAN ASSISTANT

## 2021-07-01 PROCEDURE — 80053 COMPREHEN METABOLIC PANEL: CPT

## 2021-07-01 PROCEDURE — G0378 HOSPITAL OBSERVATION PER HR: HCPCS

## 2021-07-01 PROCEDURE — 6370000000 HC RX 637 (ALT 250 FOR IP): Performed by: PHYSICIAN ASSISTANT

## 2021-07-01 PROCEDURE — 6360000002 HC RX W HCPCS: Performed by: PHYSICIAN ASSISTANT

## 2021-07-01 RX ORDER — BENZONATATE 100 MG/1
100 CAPSULE ORAL 2 TIMES DAILY PRN
Qty: 14 CAPSULE | Refills: 0 | Status: SHIPPED | OUTPATIENT
Start: 2021-07-01 | End: 2021-07-03

## 2021-07-01 RX ORDER — GUAIFENESIN/DEXTROMETHORPHAN 100-10MG/5
5 SYRUP ORAL 3 TIMES DAILY PRN
Qty: 120 ML | Refills: 0 | Status: ON HOLD | OUTPATIENT
Start: 2021-07-01 | End: 2021-07-12 | Stop reason: HOSPADM

## 2021-07-01 RX ADMIN — CITALOPRAM HYDROBROMIDE 10 MG: 20 TABLET ORAL at 08:19

## 2021-07-01 RX ADMIN — GUAIFENESIN AND DEXTROMETHORPHAN 5 ML: 100; 10 SYRUP ORAL at 03:18

## 2021-07-01 RX ADMIN — GUAIFENESIN AND DEXTROMETHORPHAN 5 ML: 100; 10 SYRUP ORAL at 12:28

## 2021-07-01 RX ADMIN — ENOXAPARIN SODIUM 30 MG: 30 INJECTION SUBCUTANEOUS at 08:19

## 2021-07-01 RX ADMIN — Medication 2000 UNITS: at 08:19

## 2021-07-01 RX ADMIN — GUAIFENESIN AND DEXTROMETHORPHAN 5 ML: 100; 10 SYRUP ORAL at 08:19

## 2021-07-01 RX ADMIN — SODIUM CHLORIDE: 9 INJECTION, SOLUTION INTRAVENOUS at 03:19

## 2021-07-01 ASSESSMENT — PAIN SCALES - GENERAL: PAINLEVEL_OUTOF10: 0

## 2021-07-01 NOTE — FLOWSHEET NOTE
06/30/21 2306   Vital Signs   Temp 98.3 °F (36.8 °C)   Temp Source Oral   Pt resting in bed, asked PCA for pain med and cough medication. Writer notified pt that it was available until after midnight. Pt verbalized understanding.

## 2021-07-01 NOTE — CARE COORDINATION
Advance Care Planning   Healthcare Decision Maker:    Primary Decision Maker: Chrisdavid Ontiveros [facility] - [phone]    Secondary Decision Maker: [facility] - [phone]      Today we documented Decision Maker(s) consistent with Legal Next of Kin hierarchy.

## 2021-07-01 NOTE — CARE COORDINATION
Case Management Assessment  Initial Evaluation      Patient Name: Chava Brady  YOB: 1965  Diagnosis: COVID-19 [U07.1]  Date / Time: 6/30/2021 10:57 AM    Admission status/Date: OBS  Chart Reviewed: Yes      Patient Interviewed: Yes   Family Interviewed:  No      Hospitalization in the last 30 days:  No      Health Care Decision Maker :   Primary Decision MakerCnelsonchilango Greene - [phone]    Secondary Decision Maker: Khari Moore Child - [phone]    (CM - must 1st enter selection under Navigator - emergency contact- Health Care Decision Maker Relationship and pick relationship)   Who do you trust or have selected to make healthcare decisions for you      Met with: patient  Interview conducted  (bedside/phone): phone    Current PCP: LAKEISHA Kumar MD    Financial  BCBS/Medicare-Medicaid dual  Precert required for SNF : YES         3 night stay required - NA    ADLS  Support Systems/Care Needs: Children, Family Members  Transportation: self/taxi    Meal Preparation: self    Housing  Living Arrangements: Lives alone. IPTA.   Steps: NA  Intent for return to present living arrangements: Yes  Identified Issues: NA    Home Care Information  Active with [address] : No Agency:(Services)  Type of Home Care Services: None  Passport/Waiver : No  :                      Phone Number:    Passport/Waiver Services: NA          Durable Medical Equiptment   DME Provider: ELINA  Equipment: ELINA  Walker___Cane___RTS___ BSC___Shower Chair___Hospital Bed___W/C____Other________  02 at ____Liter(s)---wears(frequency)_______ HHN ___ CPAP___ BiPap___   N/A____      Home O2 Use :  No      Informed of need for care provider to bring portable home O2 tank on day of discharge for nursing to connect prior to leaving:   Not Indicated  Verbalized agreement/Understanding:   Not Indicated    Community Service Affiliation  Dialysis:  No    · Agency:  · Location:  · Dialysis Schedule:  · Phone: · Fax: Other Community Services: (ex:PT/OT,Mental Health,Wound Clinic, Cardio/Pul [address])    DISCHARGE PLAN: Explained Case Management role/services. Chart reviewed. Met with pt by phone due to C19 restrictions and explained the role of the CM. Plans to return home. May need assistance with transportation home due to having been brought to ED by ambulance and no family living nearby to come pick her up. She does not have cash here to pay for a taxi or her meds. She will need OP Pharmacy for M2B for DC scripts.  +CM following

## 2021-07-01 NOTE — FLOWSHEET NOTE
07/01/21 0758   Vital Signs   Temp 97.1 °F (36.2 °C)   Temp Source Oral   Pulse 80   Heart Rate Source Monitor   Resp 18   /76   BP Location Left upper arm   Patient Position Semi fowlers   Level of Consciousness Alert (0)   MEWS Score 1   Patient Currently in Pain No   Pain Assessment   Pain Assessment 0-10   Pain Level 0   Patient's Stated Pain Goal No pain   Oxygen Therapy   SpO2 97 %   O2 Device None (Room air)   AM assessment completed, see flow sheet. Pt is alert and oriented. Vital signs are WNL. Respirations are even & easy. Pt. Has c/o coughing and discomfort. Pt denies needs at this time. SR up x 2, and bed in low position. Call light is within reach. Bedside Mobility Assessment Tool (BMAT):     Assessment Level 1- Sit and Shake    1. From a semi-reclined position, ask patient to sit up and rotate to a seated position at the side of the bed. Can use the bedrail. 2. Ask patient to reach out and grab your hand and shake making sure patient reaches across his/her midline. Pass- Patient is able to come to a seated position, maintain core strength. Maintains seated balance while reaching across midline. Move on to Assessment Level 2. Assessment Level 2- Stretch and Point   1. With patient in seated position at the side of the bed, have patient place both feet on the floor (or stool) with knees no higher than hips. 2. Ask patient to stretch one leg and straighten the knee, then bend the ankle/flex and point the toes. If appropriate, repeat with the other leg. Pass- Patient is able to demonstrate appropriate quad strength on intended weight bearing limb(s). Move onto Assessment Level 3. Assessment Level 3- Stand   1. Ask patient to elevate off the bed or chair (seated to standing) using an assistive device (cane, bedrail). 2. Patient should be able to raise buttocks off be and hold for a count of five. May repeat once.    Pass- Patient maintains standing stability for at least 5 seconds, proceed to assessment level 4. Assessment Level 4- Walk   1. Ask patient to march in place at bedside. 2. Then ask patient to advance step and return each foot. Some medical conditions may render a patient from stepping backwards, use your best clinical judgement. Pass- Patient demonstrates balance while shifting weight and ability to step, takes independent steps, does not use assistive device patient is MOBILITY LEVEL 4. Mobility Level- 4    Patient is able to demonstrate the ability to move from a reclining position to an upright position within the recliner.

## 2021-07-01 NOTE — DISCHARGE SUMMARY
Name:  [facility]  Room:  0213/0213-02  MRN:    [id number]    Discharge Summary      This discharge summary is in conjunction with a complete physical exam done on the day of discharge. Discharging Provider: John Espinoza PA-C    Attending Provider: Denese Denver, MD       Admit: 6/30/2021  Discharge: 7/1/2021     HPI taken from admission H&P:      The patient is a 54 y.o. female with paroxysmal atrial fibrillation, status post cardiac pacemaker, status post mitral valve replacement, bioprosthetic, mild aortic stenosis, COPD who presented to [facility] ED with complaint of illness 2/2 COVID 19. She did not get vaccinated against COVID 19. She started feeling ill about 5 days ago. She went to [address] [place] [address] and tested positive for COVID-19. Her symptoms include a dry nonproductive cough, fevers, chills, fatigue, nausea, vomiting, diarrhea. She has been unable to tolerate orally secondary to her GI symptoms. She has been coughing so much she cannot sleep. She went to the emergency department 2 days ago and was subsequently discharged home. She returned to the emergency department today as she felt weak and she was unable to tolerate orally. ER work-up revealed that the patient was not hypoxic. CT chest shows findings consistent with COVID-19 pneumonia. She was hypokalemic. She was treated with IV fluids in the emergency department, but due to her inability to tolerate orally she will be admitted for IV fluids and symptom management, observation overnight.     Diagnoses this Admission and Hospital Course     COVID 19 infection with viral pneumonia   - symptom onset > 5 days ago. Marcos Arreguin has not been vaccinated.    - not hypoxic, O2 sat 93% w/ exertion on RA in ER  - has symptoms of cough, SOB, high fevers, N/V/D  - has evidence of viral PNA on CT chest & has been unable to tolerate PO   - admitted for IV hydration and symptom control  - IV fluids, antipyretics, anti-nausea medication, anti-diarrheals, cough suppressants PRN  - No indication for steroids or remdesivir  - Monitored symptoms, O2 sats remained stable  - discharged with PRN Robitussin and Tessalon with pulse-oximeter     Hypokalemia - Resolved  - replaced     Paroxysmal atrial fibrillation  Cardiac pacemaker in place  Status post mitral valve replacement, bioprosthetic  Mild Aortic stenosis  - currently paced rhythm  - she takes no cardiac meds, no AC  - f/w Dr Wil Pradhan at [facility]  - no acute issues noted      COPD  - no AE     Depression  - continued celexa     Procedures (Please Review Full Report for Details)  N/A    Consults    Orthopedic Surgery  PT/OT    Physical Exam at Discharge:    /76   Pulse 80   Temp 97.1 °F (36.2 °C) (Oral)   Resp 18   Ht 5' 6\" (1.676 m)   Wt 147 lb (66.7 kg)   SpO2 97%   BMI 23.73 kg/m²   Gen: No distress. Alert. Eyes: No sclera icterus. No conjunctival injection. ENT: No discharge. Pharynx clear. Neck: Trachea midline. Resp: No accessory muscle use. + bilateral crackles. No wheezes. No rhonchi. CV: Regular rate. Regular rhythm. 2/6 systolic murmur.  No rub. No edema. GI: Soft, Non-tender. Non-distended. Normal bowel sounds. Skin: Warm and dry. No rash on exposed extremities. Neuro: Awake. Grossly non-focal, moves all extremities, follows commands  Psych: Oriented x 4.  No anxiety or agitation    CBC:   Recent Labs     06/30/21 1114 06/30/21 1923 07/01/21  0527   WBC 4.4 4.7 4.1   HGB 11.7* 10.5* 10.7*   HCT 34.6* 30.4* 31.2*   MCV 89.6 89.9 89.9   * 125* 134*     BMP:   Recent Labs     06/30/21  1114 06/30/21 1923 07/01/21  0527    134* 140   K 3.0* 2.8* 3.7    102 108   CO2 25 23 23   BUN 6* 5* 8   CREATININE 0.6 0.6 <0.5*     LIVER PROFILE:   Recent Labs     06/30/21  1114 06/30/21 1923 07/01/21  0527   AST 42* 41* 41*   ALT 19 16 17   BILITOT 0.3 0.3 <0.2   ALKPHOS 114 107 103     CULTURES    SARS-COV-2 - Rapid: Detected     RADIOLOGY    CT CHEST PULMONARY EMBOLISM W CONTRAST 6/30/2021   Final Result   Negative for acute pulmonary embolism. Bilateral peripheral pulmonary opacities suggesting COVID pneumonia in this   COVID positive patient         XR CHEST PORTABLE 6/30/2021   Final Result   Interval development of multifocal bilateral pulmonary infiltrates consistent   with the given history of COVID-19 pneumonia. Discharge Medications     Medication List      START taking these medications    guaiFENesin-dextromethorphan 100-10 MG/5ML syrup  Commonly known as: ROBITUSSIN DM  Take 5 mLs by mouth 3 times daily as needed for Cough        CONTINUE taking these medications    benzonatate 100 MG capsule  Commonly known as: TESSALON  Take 1 capsule by mouth 2 times daily as needed for Cough     Biotin 5 MG Tbdp     Cholecalciferol 50 MCG (2000 UT) Caps     citalopram 10 MG tablet  Commonly known as: CELEXA     oxyCODONE-acetaminophen 7.5-325 MG per tablet  Commonly known as: PERCOCET     therapeutic multivitamin-minerals tablet           Where to Get Your Medications      These medications were sent to [place] [id number] [facility] - P [phone] - F [phone]  [address] [facility] [address]    Phone: [phone]   · benzonatate 100 MG capsule  · guaiFENesin-dextromethorphan 100-10 MG/5ML syrup       The patient is not hypoxic even with ambulation. She has a few crackles on clinical exam she is afebrile. She will be discharged home after lunch today. She was asked to come back to the hospital if her oxygen saturation worsen or if she felt worse. I also offered for her to stay in the hospital for another day but she said she wanted to go home. Discharged in stable condition to home. Follow Up: Follow up with PCP in 1 week.     Laura Goel PA-C 11:58 AM 7/1/2021       Neli Phipps MD 7/1/2021 12:05 PM

## 2021-07-01 NOTE — FLOWSHEET NOTE
07/01/21 0315   Vital Signs   Temp 97 °F (36.1 °C)   Temp Source Oral   Pulse 72   Resp 16   /70   BP Location Left upper arm   Level of Consciousness Alert (0)   MEWS Score 1   Oxygen Therapy   SpO2 97 %   O2 Device None (Room air)   PT given cough syrup for dry NPC. Pt requested warm blanket and ice chips. Gave both to pt.  Marylou Delaney RN

## 2021-07-01 NOTE — PLAN OF CARE
Problem: Airway Clearance - Ineffective  Goal: Achieve or maintain patent airway  Outcome: Ongoing     Problem: Gas Exchange - Impaired  Goal: Absence of hypoxia  Outcome: Ongoing     Problem: Nutrition Deficits  Goal: Optimize nutritional status  Outcome: Ongoing

## 2021-07-02 ENCOUNTER — CARE COORDINATION (OUTPATIENT)
Dept: CASE MANAGEMENT | Age: 56
End: 2021-07-02

## 2021-07-02 NOTE — CARE COORDINATION
Patient contacted regarding COVID-19 diagnosis. COVID-19 Detected on 6/30/2021. Call within 2 business days of discharge: Yes  Discharge Date: 7/1/21   RARS: No data recorded - obs      1st attempt - Unable to reach patient by phone at this time. Message left requesting return call.        Gera Mosqueda RN  Care Transitions Nurse  [phone] mobile    Future Appointments   Date Time Provider [facility]   7/14/2021  9:30 AM MD [facility]

## 2021-07-03 ENCOUNTER — HOSPITAL ENCOUNTER (INPATIENT)
Age: 56
LOS: 8 days | Discharge: HOME | DRG: 177 | End: 2021-07-12
Attending: EMERGENCY MEDICINE | Admitting: HOSPITALIST
Payer: MEDICARE

## 2021-07-03 DIAGNOSIS — U07.1 ACUTE HYPOXEMIC RESPIRATORY FAILURE DUE TO COVID-19 (HCC): Primary | ICD-10-CM

## 2021-07-03 DIAGNOSIS — J96.01 ACUTE HYPOXEMIC RESPIRATORY FAILURE DUE TO COVID-19 (HCC): Primary | ICD-10-CM

## 2021-07-03 PROCEDURE — 83735 ASSAY OF MAGNESIUM: CPT

## 2021-07-03 PROCEDURE — 84484 ASSAY OF TROPONIN QUANT: CPT

## 2021-07-03 PROCEDURE — 93005 ELECTROCARDIOGRAM TRACING: CPT | Performed by: EMERGENCY MEDICINE

## 2021-07-03 PROCEDURE — 80053 COMPREHEN METABOLIC PANEL: CPT

## 2021-07-03 PROCEDURE — 82803 BLOOD GASES ANY COMBINATION: CPT

## 2021-07-03 PROCEDURE — 99285 EMERGENCY DEPT VISIT HI MDM: CPT

## 2021-07-03 PROCEDURE — 85025 COMPLETE CBC W/AUTO DIFF WBC: CPT

## 2021-07-03 PROCEDURE — 96374 THER/PROPH/DIAG INJ IV PUSH: CPT

## 2021-07-03 RX ORDER — DEXAMETHASONE SODIUM PHOSPHATE 10 MG/ML
6 INJECTION, SOLUTION INTRAMUSCULAR; INTRAVENOUS ONCE
Status: COMPLETED | OUTPATIENT
Start: 2021-07-04 | End: 2021-07-04

## 2021-07-03 ASSESSMENT — PAIN DESCRIPTION - DESCRIPTORS: DESCRIPTORS: ACHING

## 2021-07-03 ASSESSMENT — PAIN DESCRIPTION - LOCATION: LOCATION: BACK;CHEST

## 2021-07-03 ASSESSMENT — PAIN SCALES - GENERAL: PAINLEVEL_OUTOF10: 6

## 2021-07-04 ENCOUNTER — APPOINTMENT (OUTPATIENT)
Dept: GENERAL RADIOLOGY | Age: 56
DRG: 177 | End: 2021-07-04
Payer: MEDICARE

## 2021-07-04 PROBLEM — U07.1 PNEUMONIA DUE TO COVID-19 VIRUS: Status: ACTIVE | Noted: 2021-07-04

## 2021-07-04 PROBLEM — J96.01 ACUTE HYPOXEMIC RESPIRATORY FAILURE DUE TO COVID-19 (HCC): Status: ACTIVE | Noted: 2021-07-04

## 2021-07-04 PROBLEM — J12.82 PNEUMONIA DUE TO COVID-19 VIRUS: Status: ACTIVE | Noted: 2021-07-04

## 2021-07-04 PROBLEM — U07.1 ACUTE RESPIRATORY FAILURE DUE TO COVID-19 (HCC): Status: ACTIVE | Noted: 2021-07-04

## 2021-07-04 PROBLEM — J96.00 ACUTE RESPIRATORY FAILURE DUE TO COVID-19 (HCC): Status: ACTIVE | Noted: 2021-07-04

## 2021-07-04 LAB
A/G RATIO: 1.1 (ref 1.1–2.2)
ALBUMIN SERPL-MCNC: 3.2 G/DL (ref 3.4–5)
ALP BLD-CCNC: 166 U/L (ref 40–129)
ALT SERPL-CCNC: 39 U/L (ref 10–40)
ANION GAP SERPL CALCULATED.3IONS-SCNC: 10 MMOL/L (ref 3–16)
AST SERPL-CCNC: 62 U/L (ref 15–37)
BASE EXCESS VENOUS: 4.3 MMOL/L (ref -3–3)
BASOPHILS ABSOLUTE: 0 K/UL (ref 0–0.2)
BASOPHILS RELATIVE PERCENT: 0.2 %
BILIRUB SERPL-MCNC: 0.6 MG/DL (ref 0–1)
BUN BLDV-MCNC: 7 MG/DL (ref 7–20)
C-REACTIVE PROTEIN: 171.8 MG/L (ref 0–5.1)
CALCIUM SERPL-MCNC: 8 MG/DL (ref 8.3–10.6)
CARBOXYHEMOGLOBIN: 1.5 % (ref 0–1.5)
CHLORIDE BLD-SCNC: 101 MMOL/L (ref 99–110)
CO2: 27 MMOL/L (ref 21–32)
CREAT SERPL-MCNC: <0.5 MG/DL (ref 0.6–1.1)
EKG ATRIAL RATE: 78 BPM
EKG DIAGNOSIS: NORMAL
EKG P AXIS: 84 DEGREES
EKG P-R INTERVAL: 302 MS
EKG Q-T INTERVAL: 414 MS
EKG QRS DURATION: 152 MS
EKG QTC CALCULATION (BAZETT): 471 MS
EKG R AXIS: -62 DEGREES
EKG T AXIS: 87 DEGREES
EKG VENTRICULAR RATE: 78 BPM
EOSINOPHILS ABSOLUTE: 0 K/UL (ref 0–0.6)
EOSINOPHILS RELATIVE PERCENT: 0 %
FERRITIN: 819.5 NG/ML (ref 15–150)
GFR AFRICAN AMERICAN: >60
GFR NON-AFRICAN AMERICAN: >60
GLOBULIN: 2.8 G/DL
GLUCOSE BLD-MCNC: 104 MG/DL (ref 70–99)
HCO3 VENOUS: 29.8 MMOL/L (ref 23–29)
HCT VFR BLD CALC: 32 % (ref 36–48)
HEMOGLOBIN: 10.8 G/DL (ref 12–16)
LACTATE DEHYDROGENASE: 558 U/L (ref 100–190)
LYMPHOCYTES ABSOLUTE: 0.5 K/UL (ref 1–5.1)
LYMPHOCYTES RELATIVE PERCENT: 8.2 %
MAGNESIUM: 2.1 MG/DL (ref 1.8–2.4)
MCH RBC QN AUTO: 30.5 PG (ref 26–34)
MCHC RBC AUTO-ENTMCNC: 33.9 G/DL (ref 31–36)
MCV RBC AUTO: 90 FL (ref 80–100)
METHEMOGLOBIN VENOUS: 0.3 %
MONOCYTES ABSOLUTE: 0.2 K/UL (ref 0–1.3)
MONOCYTES RELATIVE PERCENT: 3.1 %
NEUTROPHILS ABSOLUTE: 5.4 K/UL (ref 1.7–7.7)
NEUTROPHILS RELATIVE PERCENT: 88.5 %
O2 SAT, VEN: 63 %
O2 THERAPY: ABNORMAL
PCO2, VEN: 48.6 MMHG (ref 40–50)
PDW BLD-RTO: 14.9 % (ref 12.4–15.4)
PH VENOUS: 7.41 (ref 7.35–7.45)
PLATELET # BLD: 258 K/UL (ref 135–450)
PMV BLD AUTO: 7.4 FL (ref 5–10.5)
PO2, VEN: 33 MMHG (ref 25–40)
POTASSIUM REFLEX MAGNESIUM: 3.2 MMOL/L (ref 3.5–5.1)
PROCALCITONIN: 0.17 NG/ML (ref 0–0.15)
RBC # BLD: 3.55 M/UL (ref 4–5.2)
SODIUM BLD-SCNC: 138 MMOL/L (ref 136–145)
TCO2 CALC VENOUS: 31 MMOL/L
TOTAL PROTEIN: 6 G/DL (ref 6.4–8.2)
TROPONIN: <0.01 NG/ML
WBC # BLD: 6.1 K/UL (ref 4–11)

## 2021-07-04 PROCEDURE — 6360000002 HC RX W HCPCS: Performed by: HOSPITALIST

## 2021-07-04 PROCEDURE — 96374 THER/PROPH/DIAG INJ IV PUSH: CPT

## 2021-07-04 PROCEDURE — 82728 ASSAY OF FERRITIN: CPT

## 2021-07-04 PROCEDURE — 1200000000 HC SEMI PRIVATE

## 2021-07-04 PROCEDURE — 36415 COLL VENOUS BLD VENIPUNCTURE: CPT

## 2021-07-04 PROCEDURE — 6370000000 HC RX 637 (ALT 250 FOR IP): Performed by: INTERNAL MEDICINE

## 2021-07-04 PROCEDURE — 99223 1ST HOSP IP/OBS HIGH 75: CPT | Performed by: INTERNAL MEDICINE

## 2021-07-04 PROCEDURE — 84145 PROCALCITONIN (PCT): CPT

## 2021-07-04 PROCEDURE — 2580000003 HC RX 258: Performed by: INTERNAL MEDICINE

## 2021-07-04 PROCEDURE — XW033H5 INTRODUCTION OF TOCILIZUMAB INTO PERIPHERAL VEIN, PERCUTANEOUS APPROACH, NEW TECHNOLOGY GROUP 5: ICD-10-PCS | Performed by: INTERNAL MEDICINE

## 2021-07-04 PROCEDURE — 6370000000 HC RX 637 (ALT 250 FOR IP): Performed by: EMERGENCY MEDICINE

## 2021-07-04 PROCEDURE — 6370000000 HC RX 637 (ALT 250 FOR IP): Performed by: HOSPITALIST

## 2021-07-04 PROCEDURE — 86140 C-REACTIVE PROTEIN: CPT

## 2021-07-04 PROCEDURE — 2700000000 HC OXYGEN THERAPY PER DAY

## 2021-07-04 PROCEDURE — 71046 X-RAY EXAM CHEST 2 VIEWS: CPT

## 2021-07-04 PROCEDURE — 6370000000 HC RX 637 (ALT 250 FOR IP): Performed by: NURSE PRACTITIONER

## 2021-07-04 PROCEDURE — XW033E5 INTRODUCTION OF REMDESIVIR ANTI-INFECTIVE INTO PERIPHERAL VEIN, PERCUTANEOUS APPROACH, NEW TECHNOLOGY GROUP 5: ICD-10-PCS | Performed by: INTERNAL MEDICINE

## 2021-07-04 PROCEDURE — 94761 N-INVAS EAR/PLS OXIMETRY MLT: CPT

## 2021-07-04 PROCEDURE — 96375 TX/PRO/DX INJ NEW DRUG ADDON: CPT

## 2021-07-04 PROCEDURE — 83615 LACTATE (LD) (LDH) ENZYME: CPT

## 2021-07-04 PROCEDURE — 6360000002 HC RX W HCPCS: Performed by: EMERGENCY MEDICINE

## 2021-07-04 PROCEDURE — 2580000003 HC RX 258: Performed by: HOSPITALIST

## 2021-07-04 PROCEDURE — 6360000002 HC RX W HCPCS: Performed by: NURSE PRACTITIONER

## 2021-07-04 PROCEDURE — 93010 ELECTROCARDIOGRAM REPORT: CPT | Performed by: INTERNAL MEDICINE

## 2021-07-04 PROCEDURE — 2500000003 HC RX 250 WO HCPCS: Performed by: INTERNAL MEDICINE

## 2021-07-04 RX ORDER — ACETAMINOPHEN 325 MG/1
650 TABLET ORAL EVERY 6 HOURS PRN
Status: DISCONTINUED | OUTPATIENT
Start: 2021-07-04 | End: 2021-07-12 | Stop reason: HOSPADM

## 2021-07-04 RX ORDER — DEXAMETHASONE SODIUM PHOSPHATE 10 MG/ML
6 INJECTION, SOLUTION INTRAMUSCULAR; INTRAVENOUS EVERY 24 HOURS
Status: DISCONTINUED | OUTPATIENT
Start: 2021-07-05 | End: 2021-07-12 | Stop reason: HOSPADM

## 2021-07-04 RX ORDER — GUAIFENESIN/DEXTROMETHORPHAN 100-10MG/5
5 SYRUP ORAL 3 TIMES DAILY PRN
Status: DISCONTINUED | OUTPATIENT
Start: 2021-07-04 | End: 2021-07-04

## 2021-07-04 RX ORDER — ONDANSETRON 2 MG/ML
4 INJECTION INTRAMUSCULAR; INTRAVENOUS EVERY 6 HOURS PRN
Status: DISCONTINUED | OUTPATIENT
Start: 2021-07-04 | End: 2021-07-12 | Stop reason: HOSPADM

## 2021-07-04 RX ORDER — ONDANSETRON 4 MG/1
4 TABLET, ORALLY DISINTEGRATING ORAL EVERY 8 HOURS PRN
Status: DISCONTINUED | OUTPATIENT
Start: 2021-07-04 | End: 2021-07-12 | Stop reason: HOSPADM

## 2021-07-04 RX ORDER — SODIUM CHLORIDE 9 MG/ML
25 INJECTION, SOLUTION INTRAVENOUS PRN
Status: DISCONTINUED | OUTPATIENT
Start: 2021-07-04 | End: 2021-07-12 | Stop reason: HOSPADM

## 2021-07-04 RX ORDER — OXYCODONE AND ACETAMINOPHEN 7.5; 325 MG/1; MG/1
1 TABLET ORAL EVERY 4 HOURS PRN
Status: DISCONTINUED | OUTPATIENT
Start: 2021-07-04 | End: 2021-07-10

## 2021-07-04 RX ORDER — SODIUM CHLORIDE 0.9 % (FLUSH) 0.9 %
5-40 SYRINGE (ML) INJECTION EVERY 12 HOURS SCHEDULED
Status: DISCONTINUED | OUTPATIENT
Start: 2021-07-04 | End: 2021-07-12 | Stop reason: HOSPADM

## 2021-07-04 RX ORDER — SODIUM CHLORIDE 0.9 % (FLUSH) 0.9 %
5-40 SYRINGE (ML) INJECTION PRN
Status: DISCONTINUED | OUTPATIENT
Start: 2021-07-04 | End: 2021-07-12 | Stop reason: HOSPADM

## 2021-07-04 RX ORDER — ACETAMINOPHEN 650 MG/1
650 SUPPOSITORY RECTAL EVERY 6 HOURS PRN
Status: DISCONTINUED | OUTPATIENT
Start: 2021-07-04 | End: 2021-07-12 | Stop reason: HOSPADM

## 2021-07-04 RX ORDER — VITAMIN B COMPLEX
2000 TABLET ORAL DAILY
Status: DISCONTINUED | OUTPATIENT
Start: 2021-07-04 | End: 2021-07-12 | Stop reason: HOSPADM

## 2021-07-04 RX ORDER — POLYETHYLENE GLYCOL 3350 17 G/17G
17 POWDER, FOR SOLUTION ORAL DAILY PRN
Status: DISCONTINUED | OUTPATIENT
Start: 2021-07-04 | End: 2021-07-12 | Stop reason: HOSPADM

## 2021-07-04 RX ORDER — M-VIT,TX,IRON,MINS/CALC/FOLIC 27MG-0.4MG
1 TABLET ORAL DAILY
Status: DISCONTINUED | OUTPATIENT
Start: 2021-07-04 | End: 2021-07-12 | Stop reason: HOSPADM

## 2021-07-04 RX ORDER — POTASSIUM CHLORIDE 7.45 MG/ML
10 INJECTION INTRAVENOUS ONCE
Status: COMPLETED | OUTPATIENT
Start: 2021-07-04 | End: 2021-07-04

## 2021-07-04 RX ORDER — 0.9 % SODIUM CHLORIDE 0.9 %
30 INTRAVENOUS SOLUTION INTRAVENOUS PRN
Status: DISCONTINUED | OUTPATIENT
Start: 2021-07-04 | End: 2021-07-12 | Stop reason: HOSPADM

## 2021-07-04 RX ORDER — POTASSIUM CHLORIDE 20 MEQ/1
20 TABLET, EXTENDED RELEASE ORAL ONCE
Status: COMPLETED | OUTPATIENT
Start: 2021-07-04 | End: 2021-07-04

## 2021-07-04 RX ORDER — GUAIFENESIN/DEXTROMETHORPHAN 100-10MG/5
5 SYRUP ORAL EVERY 4 HOURS PRN
Status: DISCONTINUED | OUTPATIENT
Start: 2021-07-04 | End: 2021-07-12 | Stop reason: HOSPADM

## 2021-07-04 RX ORDER — CITALOPRAM 20 MG/1
10 TABLET ORAL DAILY
Status: DISCONTINUED | OUTPATIENT
Start: 2021-07-04 | End: 2021-07-12 | Stop reason: HOSPADM

## 2021-07-04 RX ORDER — OXYCODONE HYDROCHLORIDE AND ACETAMINOPHEN 5; 325 MG/1; MG/1
1 TABLET ORAL ONCE
Status: COMPLETED | OUTPATIENT
Start: 2021-07-04 | End: 2021-07-04

## 2021-07-04 RX ADMIN — POTASSIUM CHLORIDE 20 MEQ: 1500 TABLET, EXTENDED RELEASE ORAL at 12:32

## 2021-07-04 RX ADMIN — TOCILIZUMAB 600 MG: 20 INJECTION, SOLUTION, CONCENTRATE INTRAVENOUS at 04:47

## 2021-07-04 RX ADMIN — DEXAMETHASONE SODIUM PHOSPHATE 6 MG: 10 INJECTION, SOLUTION INTRAMUSCULAR; INTRAVENOUS at 02:00

## 2021-07-04 RX ADMIN — GUAIFENESIN AND DEXTROMETHORPHAN 5 ML: 100; 10 SYRUP ORAL at 04:28

## 2021-07-04 RX ADMIN — Medication 10 ML: at 04:28

## 2021-07-04 RX ADMIN — GUAIFENESIN AND DEXTROMETHORPHAN 5 ML: 100; 10 SYRUP ORAL at 10:11

## 2021-07-04 RX ADMIN — GUAIFENESIN AND DEXTROMETHORPHAN 5 ML: 100; 10 SYRUP ORAL at 20:23

## 2021-07-04 RX ADMIN — Medication 2000 UNITS: at 10:11

## 2021-07-04 RX ADMIN — SODIUM CHLORIDE, PRESERVATIVE FREE 10 ML: 5 INJECTION INTRAVENOUS at 20:24

## 2021-07-04 RX ADMIN — Medication 1 TABLET: at 10:11

## 2021-07-04 RX ADMIN — ENOXAPARIN SODIUM 70 MG: 80 INJECTION SUBCUTANEOUS at 12:32

## 2021-07-04 RX ADMIN — ENOXAPARIN SODIUM 70 MG: 80 INJECTION SUBCUTANEOUS at 20:23

## 2021-07-04 RX ADMIN — GUAIFENESIN AND DEXTROMETHORPHAN 5 ML: 100; 10 SYRUP ORAL at 15:40

## 2021-07-04 RX ADMIN — POTASSIUM CHLORIDE 10 MEQ: 7.46 INJECTION, SOLUTION INTRAVENOUS at 02:00

## 2021-07-04 RX ADMIN — SODIUM CHLORIDE 100 ML: 0.9 INJECTION, SOLUTION INTRAVENOUS at 14:04

## 2021-07-04 RX ADMIN — CITALOPRAM HYDROBROMIDE 10 MG: 20 TABLET ORAL at 10:11

## 2021-07-04 RX ADMIN — OXYCODONE HYDROCHLORIDE AND ACETAMINOPHEN 1 TABLET: 5; 325 TABLET ORAL at 02:13

## 2021-07-04 RX ADMIN — SODIUM CHLORIDE, PRESERVATIVE FREE 10 ML: 5 INJECTION INTRAVENOUS at 10:13

## 2021-07-04 RX ADMIN — REMDESIVIR 200 MG: 100 INJECTION, POWDER, LYOPHILIZED, FOR SOLUTION INTRAVENOUS at 14:08

## 2021-07-04 ASSESSMENT — PAIN SCALES - GENERAL
PAINLEVEL_OUTOF10: 5
PAINLEVEL_OUTOF10: 0
PAINLEVEL_OUTOF10: 8

## 2021-07-04 NOTE — ED NOTES
Bed: 01  Expected date:   Expected time:   Means of arrival:   Comments:  Guille Lake RN  07/03/21 6599

## 2021-07-04 NOTE — PROGRESS NOTES
07/04/21 0746   Vital Signs   Temp 97 °F (36.1 °C)   Temp Source Oral   Pulse 63   Heart Rate Source Monitor   Resp 16   /77   BP Location Left upper arm   Patient Position Semi fowlers   Level of Consciousness Alert (0)   MEWS Score 1   Oxygen Therapy   SpO2 95 %   O2 Device Nasal cannula   O2 Flow Rate (L/min) 3 L/min     Vitals as above this am. Patient's supplemental oxygen decreased to 2L at this time, saturations remains above 92% at rest. Assessment completed, see flowsheets. Patient medicated with PRN robitussin at this time, see Katie and lakshmi took her regularly scheduled medications. Call light within reach, patient states she would like her robitussin more frequently if possible, discussed this with Dr. Concepción Scott, and he modified order from TID PRN to every 4 hours PRN.

## 2021-07-04 NOTE — ED NOTES
8348- [facility] serve sent to Kimmy Freitas returned call and spoke with Dr. Ovidio Rodriguez  07/04/21 2892       Nancy Yeager  07/04/21 2254

## 2021-07-04 NOTE — CONSULTS
Remdesivir Protocol:  200mg IVPB x1 today followed by 100mg IV x4 additional days.   Continue to monitor LFTs, O2 requirements etc.  Aleyda George, [address] [place] [address] PharmD 7/4/2021 11:57 AM

## 2021-07-04 NOTE — PROGRESS NOTES
Pt to 2W via cot by ED RN; telemetry and continuous pulse ox placed; call light within reach, bed alarm in place.   Brandyn Aquino, RN

## 2021-07-04 NOTE — FLOWSHEET NOTE
07/04/21 0345   Vital Signs   Temp 99 °F (37.2 °C)   Temp Source Oral   Pulse 72   Heart Rate Source Monitor   Resp 20   /65   BP Location Left upper arm   Patient Position Semi fowlers   Level of Consciousness Alert (0)   MEWS Score 1   Oxygen Therapy   SpO2 94 %   O2 Device Nasal cannula   O2 Flow Rate (L/min) 3 L/min   Admission questions complete per pt report at this time. Assessment complete, see flowsheets. Pt declines 4eyes at this time; CHG wipes provided for pt to use independently- pt declined to use at this time but keeping in room to use later. Patient is able to demonstrate the ability to move from a reclining position to an upright position within the recliner. Pt has been educated to hospital falls prevention policy. They are aware they will be assessed every shift, and with any condition changes, by the nursing staff on their ability to perform their ADL's without need for assistance. Pt understands that based on the number of their score they are given a base score that will assign a level of low, medium, or high risk for falls. This patient has rated a medium which does not require a bed / chair alarm for their safety to prevent a fall. The patient is alert and oriented, and acknowledges and understands the need for intervention but refuses the application and use of the bed / chair alarm. Call light within reach. Will continue to monitor.   Tanisha Mix RN

## 2021-07-04 NOTE — PROGRESS NOTES
Pt has been educated to hospital falls prevention policy. They are aware they will be assessed every shift, and with any condition changes, by the nursing staff on their ability to perform their ADL's without need for assistance. Pt understands that based on the number of their score they are given a base score that will assign a level of low, medium, or high risk for falls. This patient has rated a high which requires a bed / chair alarm for their safety to prevent a fall. The patient is alert and oriented, and acknowledges and understands the need for intervention but refuses the application and use of the bed / chair alarm that is required per policy. Pt agrees to use call light and wait for help to arrive to assist them to get up when they need to. Call light is within reach and all other safety measures in place.   Maricarmen Centeno RN

## 2021-07-04 NOTE — ED PROVIDER NOTES
Emergency Physician Note  [address] 11 [facility] ED  [address] [facility] [place]. [address]  Dept: [phone]  Loc: [phone]  Open Note Time:  11:50 PM EDT    Chief Complaint  Shortness of Breath (COVID positive and low sat at home- in 80's.  here on room air was 87% and [placed on  oxygen at 3 liters and sat 96%)     History of Present Illness  Nellie Brown is a 54 y.o. female  has a past medical history of Anxiety and depression, Aortic valve disease, Arthritis, COPD (chronic obstructive pulmonary disease) ([facility] [address] COVID-19, History of blood transfusion, and Pacemaker. who presents to the ED for shortness of breath. Recently admitted between June 30 and July 1 for nausea and vomiting however she was found to be Covid positive during that admission. During that admission she did not require supplemental oxygen and was not treated with monoclonal antibodies or steroids. Patient was discharged on room air. She began to get worsening shortness of breath today EMS found it to be 87% on arrival, resolved with supplemental oxygen at 3 L nasal cannula. Patient is a poor historian however states she just feels awful and she is frequently coughing. She has chest pain for the cough    Denies  abdominal pain, nausea, vomiting, diarrhea,  sore throat, dysuria, back pain, rash. No palliative/provocative factors. Unless otherwise stated in this report or unable to obtain because of the patient's clinical or mental status as evidenced by the medical record, this patient's positive and negative responses for review of systems, constitutional, psych, eyes, ENT, cardiovascular, respiratory, gastrointestinal, neurological, genitourinary, musculoskeletal, integument systems and systems related to the presenting problem are either stated in the preceding paragraph or were not pertinent or were negative for the symptoms and/or complaints related to the medical problem.     I have reviewed the following from the nursing documentation:      Prior to Admission medications    Medication Sig Start Date End Date Taking? Authorizing Provider   guaiFENesin-dextromethorphan [facility] DM) 100-10 MG/5ML syrup Take 5 mLs by mouth 3 times daily as needed for Cough 7/1/21 7/11/21 Yes JOSE Ramesh   Biotin 5 MG TBDP Take 1 tablet by mouth daily 10/24/20  Yes Historical Provider, MD   Cholecalciferol 50 MCG (2000 UT) CAPS 2,000 Units daily    Yes Historical Provider, MD   oxyCODONE-acetaminophen (PERCOCET) 7.5-325 MG per tablet Take 1 tablet by mouth every 4 hours as needed for Pain .    Yes Historical Provider, MD   citalopram (CELEXA) 10 MG tablet Take 10 mg by mouth daily   Yes Historical Provider, MD   Multiple Vitamins-Minerals (THERAPEUTIC MULTIVITAMIN-MINERALS) tablet Take 1 tablet by mouth daily   Yes Historical Provider, MD       Allergies as of 07/03/2021 - Fully Reviewed 07/03/2021   Allergen Reaction Noted    Compazine [prochlorperazine maleate] Other (See Comments) 08/14/2017    Hydrocodone Itching     Hydrocodone-acetaminophen Other (See Comments) 04/20/2018    Hydrocodone-acetaminophen Other (See Comments) 12/05/2008    Ibuprofen Other (See Comments) 12/30/2020    Pregabalin Other (See Comments) 10/24/2020    Sulfamethoxazole-trimethoprim Rash 10/24/2020       Past Medical History:   Diagnosis Date    Anxiety and depression     Aortic valve disease 2016    Arthritis     COPD [facility]chronic obstructive pulmonary disease) ([facility] [address]     COVID-19 06/30/2021    History of blood transfusion     Pacemaker     Medtronic        Surgical History:   Past Surgical History:   Procedure Laterality Date    BACK SURGERY      rods/screw L4,5,6/ DJD    CARDIAC SURGERY  2017    aortic valve replacement and hole in heart    FOREARM SURGERY Left 1/4/2021    OPEN REDUCTION INTERNAL FIXATION LEFT DISTAL RADIUS performed by Elizabeth Cain MD at [facility] 37      screws and pins    PACEMAKER PLACEMENT  2017    medtronic model # [id number]    SINUS SURGERY          Family History:    Family History   Problem Relation Age of Onset    No Known Problems Mother     Heart Attack Father        Social History     Socioeconomic History    Marital status:      Spouse name: Not on file    Number of children: Not on file    Years of education: Not on file    Highest education level: Not on file   Occupational History    Not on file   Tobacco Use    Smoking status: Never Smoker    Smokeless tobacco: Never Used   Vaping Use    Vaping Use: Never used   Substance and Sexual Activity    Alcohol use: Yes     Comment: weekly couple beers    Drug use: Never    Sexual activity: Yes     Partners: Male   Other Topics Concern    Not on file   Social History Narrative    Not on file     Social Determinants of Health     Financial Resource Strain:     Difficulty of Paying Living Expenses:    Food Insecurity:     Worried About Running Out of Food in the Last Year:     Ran Out of Food in the Last Year:    Transportation Needs:     Lack of Transportation (Medical):  Lack of Transportation (Non-Medical):    Physical Activity:     Days of Exercise per Week:     Minutes of Exercise per Session:    Stress:     Feeling of Stress :    Social Connections:     Frequency of Communication with Friends and Family:     Frequency of Social Gatherings with Friends and Family:     Attends Mosque Services:     Active Member of Clubs or Organizations:     Attends Club or Organization Meetings:     Marital Status:    Intimate Partner Violence:     Fear of Current or Ex-Partner:     Emotionally Abused:     Physically Abused:     Sexually Abused:        Nursing notes reviewed.     ED Triage Vitals [07/03/21 2317]   Enc Vitals Group      /64      Pulse 77      Resp 18      Temp 99.9 °F (37.7 °C)      Temp Source Oral      SpO2 96 %      Weight 147 lb (66.7 kg)      Height 5' 6\" (1.676 m)      Head Circumference       Peak Flow       Pain Score       Pain Loc       Pain Edu? Excl. in [address]? GENERAL:   Body mass index is 23.73 kg/m². Awake, alert. Well developed, well nourished with no apparent distress. toxic-appearing, ill-appearing. HENT:   Normocephalic, Atraumatic, no lacerations. No ENT exam due to PPE. EYES:   Conjunctiva normal,   Pupils equal round and reactive to light,   Extraocular movements normal.  NECK:  Trachea is midline. No stridor. CHEST:  Regular rate and regular rhythm, no murmurs/rubs/gallops,  normal S1/S2, chest wall non-tender. LUNGS:  No respiratory distress. Actively coughing  No abdominal retractions, no sternal retractions  Or respiratory effort and diminished breath sounds bilaterally, no wheezing, no rhochi, no rales  Speaking comfortably in full sentences  ABDOMEN:  Soft, non-tender, non-distended. No guarding. No rebound. No costovertebral angle tenderness to palpation. Normal BS, no organomegaly, no abdominal masses  EXTREMITIES:  Moves extremities x4 with purpose. Normal range of motion, no edema,  No tenderness, no deformity,  distal pulses present and equal bilaterally. SKIN:  Warm, dry and intact. NEUROLOGIC:  Normal mental status. Moving all extremities to command. Alert and oriented x4  without focal motor deficit or gross sensory deficit. Normal speech. PSYCHIATRIC:  Not anxious,  normal mood and affect,  Appropriate eye contact,  thoughts are linear and organized,  without delusions/hallucinations,  Not responding to internal stimuli,  responds appropriately to questions    LABS and DIAGNOSTIC RESULTS  EKG  The Ekg interpreted by me shows  normal pacemaker rhythm with a rate of 78, ventricularly paced  Axis is   Left axis deviation  QTc is  within an acceptable range  Intervals and Durations are unremarkable. ST Segments: no acute change and normal  Delta waves, Brugada Syndrome, and Short AL are not present.   Prior EKG to compare with was available. No significant changes compared to prior EKG from June 30, 2021    RADIOLOGY  X-RAYS:  I have reviewed radiologic plain film image(s). ALL OTHER NON-PLAIN FILM IMAGES SUCH AS CT, ULTRASOUND AND MRI HAVE BEEN READ BY THE RADIOLOGIST. XR CHEST (2 VW)    (Results Pending)        Chest X-Ray  Interpreted by: Emergency Department Physician  View: Portable chest xray  Findings: Increased lung markings compared to previous checks x-ray from June 30, pacemaker in place, sternotomy wires observed      SCREENINGS  NIH Score     [facility]     [facility]    Heart Score       PROCEDURES    MEDICAL DECISION MAKING    Procedures/interventions/images ordered for this visit  Orders Placed This Encounter   Procedures    CBC Auto Differential    Troponin    Comprehensive Metabolic Panel    Blood gas, venous    Initiate Oxygen Therapy Protocol    EKG 12 Lead    Saline lock IV       Medications ordered for this visit  No orders of the defined types were placed in this encounter. ED course notes for this visit       I wore surgical mask and gloves when I evaluated the patient.     I evaluated the patient in room 01/01    Results for orders placed or performed during the hospital encounter of 07/03/21   CBC Auto Differential   Result Value Ref Range    WBC 6.1 4.0 - 11.0 K/uL    RBC 3.55 (L) 4.00 - 5.20 M/uL    Hemoglobin 10.8 (L) 12.0 - 16.0 g/dL    Hematocrit 32.0 (L) 36.0 - 48.0 %    MCV 90.0 80.0 - 100.0 fL    MCH 30.5 26.0 - 34.0 pg    MCHC 33.9 31.0 - 36.0 g/dL    RDW 14.9 12.4 - 15.4 %    Platelets 637 620 - 882 K/uL    MPV 7.4 5.0 - 10.5 fL    Neutrophils % 88.5 %    Lymphocytes % 8.2 %    Monocytes % 3.1 %    Eosinophils % 0.0 %    Basophils % 0.2 %    Neutrophils Absolute 5.4 1.7 - 7.7 K/uL    Lymphocytes Absolute 0.5 (L) 1.0 - 5.1 K/uL    Monocytes Absolute 0.2 0.0 - 1.3 K/uL    Eosinophils Absolute 0.0 0.0 - 0.6 K/uL    Basophils Absolute 0.0 0.0 - 0.2 K/uL   Comprehensive Metabolic Panel w/ Reflex to MG   Result Value Ref Range    Sodium 138 136 - 145 mmol/L    Potassium reflex Magnesium 3.2 (L) 3.5 - 5.1 mmol/L    Chloride 101 99 - 110 mmol/L    CO2 27 21 - 32 mmol/L    Anion Gap 10 3 - 16    Glucose 104 (H) 70 - 99 mg/dL    BUN 7 7 - 20 mg/dL    CREATININE <0.5 (L) 0.6 - 1.1 mg/dL    GFR Non-African American >60 >60    GFR African American >60 >60    Calcium 8.0 (L) 8.3 - 10.6 mg/dL    Total Protein 6.0 (L) 6.4 - 8.2 g/dL    Albumin 3.2 (L) 3.4 - 5.0 g/dL    Albumin/Globulin Ratio 1.1 1.1 - 2.2    Total Bilirubin 0.6 0.0 - 1.0 mg/dL    Alkaline Phosphatase 166 (H) 40 - 129 U/L    ALT 39 10 - 40 U/L    AST 62 (H) 15 - 37 U/L    Globulin 2.8 g/dL   Troponin   Result Value Ref Range    Troponin <0.01 <0.01 ng/mL   Blood gas, venous   Result Value Ref Range    pH, Michael 7.405 7.350 - 7.450    pCO2, Michael 48.6 40.0 - 50.0 mmHg    pO2, Michael 33.0 25 - 40 mmHg    HCO3, Venous 29.8 (H) 23.0 - 29.0 mmol/L    Base Excess, Michael 4.3 (H) -3.0 - 3.0 mmol/L    O2 Sat, Michael 63 Not Established %    Carboxyhemoglobin 1.5 0.0 - 1.5 %    MetHgb, Michael 0.3 <1.5 %    TC02 (Calc), Michael 31 Not Established mmol/L    O2 Therapy Unknown    Magnesium   Result Value Ref Range    Magnesium 2.10 1.80 - 2.40 mg/dL       I spoke with Dr. Mckenzie Ochoa. We thoroughly discussed the history, physical exam, laboratory and imaging studies, as well as, emergency department course. Based upon that discussion, we've decided to admit Karlee White for further observation and evaluation of Dylan Friar COVID-19 pneumonia and acute respiratory failure. As I have deemed necessary from their history, physical, and studies, I have considered and evaluated Karlee White for the following diagnoses:  Differential Diagnosis: Acute Coronary Syndrome, Congestive Heart Failure, Myocardial Infarction, Pulmonary Embolus, Pneumonia, Pneumothorax, other        FINAL IMPRESSION  1.  Acute hypoxemic respiratory failure due to COVID-19 [facility])        Vitals:  Blood pressure 116/70, pulse 76, temperature 99.9 °F (37.7 °C), temperature source Oral, resp. rate 29, height 5' 6\" (1.676 m), weight 147 lb (66.7 kg), SpO2 96 %, not currently breastfeeding. Disposition  Patient understands that they are going to be admitted to the hospital.  There was shared decision making between myself as well as the patient and/or their surrogate and we are in agreement with admission. There is an opportunity for questions and all questions answered to the best of my ability and to the satisfaction of the patient and/or patient's family. Pt is in stable condition upon Admit to telemetry. Please note, critical care time was at least 60 minutes, obtaining history, conducting a physical exam, performing and monitoring interventions, ordering, collecting and interpreting tests, and establishing medical decision-making and discussion with the patient and/or family, specifically for management of the presenting complaint and symptoms initially, direct bedside care, reevaluation, review of records, and consultation. There was a high probability of clinically significant life-threatening deterioration in the patient's condition, which required my urgent intervention. This time does not include separately billable procedures. The note was completed using Dragon voice recognition transcription. Every effort was made to ensure accuracy; however, inadvertent transcription errors may be present despite my best efforts to edit errors.     Sweetie Gleason MD  [address] [place] [address]        Sweetie Gleason MD  07/04/21 8975

## 2021-07-04 NOTE — CONSULTS
Pharmacy to initiate/dose tocilizumab for Covid-19 treatment per Dr. Marisa Morris. Patient weight is 66.7 kg  [facility] dosing policy pt weight >06 kg to <90kg = 600mg x 1 dose.   Saqib White [facility]

## 2021-07-04 NOTE — CONSULTS
Reason for referral and CC: COVID 23    HISTORY OF PRESENT ILLNESS: 55 yo female with a h/o COPD and s/p AVR presented with SOB. FOund to he hypoxic. Feells generally ill. Decreased sense of taste/smell. COVID + 6/30 and felt ill additional 4-5 days prior. Was admitted last week for COVID-19 however did not require oxygen and was discharged the next day. Febrile. The pt. was not vaccinated and is unsure of where she contracted the disease. Past Medical History:   Diagnosis Date    Anxiety and depression     Aortic valve disease 2016    Arthritis     COPD (chronic obstructive pulmonary disease) ([facility] [address]     COVID-19 06/30/2021    History of blood transfusion     Pacemaker     Medtronic     Past Surgical History:   Procedure Laterality Date    BACK SURGERY      rods/screw L4,5,6/ DJD    CARDIAC SURGERY  2017    aortic valve replacement and hole in heart    FOREARM SURGERY Left 1/4/2021    OPEN REDUCTION INTERNAL FIXATION LEFT DISTAL RADIUS performed by Kenyetta Vigil MD at [facility] 37      screws and pins    PACEMAKER PLACEMENT  2017    medtronic model # [id number]    SINUS SURGERY       Family History  family history includes Heart Attack in her father; No Known Problems in her mother. Social History:  reports that she has never smoked. She has never used smokeless tobacco.   reports current alcohol use. ALLERGIES:  Patient is allergic to compazine [prochlorperazine maleate], hydrocodone, hydrocodone-acetaminophen, hydrocodone-acetaminophen, ibuprofen, pregabalin, and sulfamethoxazole-trimethoprim.   Continuous Infusions:   sodium chloride       Scheduled Meds:   [START ON 7/5/2021] dexamethasone  6 mg Intravenous Q24H    Vitamin D  2,000 Units Oral Daily    citalopram  10 mg Oral Daily    therapeutic multivitamin-minerals  1 tablet Oral Daily    sodium chloride flush  5-40 mL Intravenous 2 times per day    enoxaparin  1 mg/kg Subcutaneous BID    potassium chloride  20 mEq Oral Once PRN Meds:  oxyCODONE-acetaminophen, sodium chloride flush, sodium chloride, ondansetron **OR** ondansetron, polyethylene glycol, acetaminophen **OR** acetaminophen, guaiFENesin-dextromethorphan    REVIEW OF SYSTEMS:  Constitutional: + for fever  HENT: Negative for sore throat  Eyes: Negative for redness   Respiratory: + for dyspnea, cough  Cardiovascular: Negative for chest pain  Gastrointestinal: Negative for vomiting, diarrhea   Genitourinary: Negative for hematuria   Musculoskeletal: Negative for arthralgias   Skin: Negative for rash  Neurological: Negative for syncope  Hematological: Negative for adenopathy  Psychiatric/Behavorial: Negative for anxiety    PHYSICAL EXAM: /77   Pulse 63   Temp 97 °F (36.1 °C) (Oral)   Resp 16   Ht 5' 6\" (1.676 m)   Wt 151 lb 1.6 oz (68.5 kg)   SpO2 93%   Breastfeeding No   BMI 24.39 kg/m²  on 3 L  Constitutional:  No acute distress. Eyes: PERRL. Conjunctivae anicteric. ENT: Normal nose. Normal tongue. Neck:  Trachea is midline. No thyroid tenderness. Respiratory: No accessory muscle usage. No wheezes. No rales. +++ Rhonchi. Cardiovascular: Normal S1S2. No digit clubbing. No digit cyanosis. No LE edema. Gastrointestinal: No mass palpated. No tenderness palpated. No umbilical hernia. Lymphatic: No cervical or supraclavicular adenopathy. Skin: No rash on the exposed extremities. No Nodules or induration on exposed extremities. Psychiatric: No anxiety or Agitation. Alert and Oriented to person, place and time. CBC:   Recent Labs     07/03/21  2355   WBC 6.1   HGB 10.8*   HCT 32.0*   MCV 90.0        BMP:   Recent Labs     07/03/21  2355      K 3.2*      CO2 27   BUN 7   CREATININE <0.5*        Recent Labs     07/03/21  2355   AST 62*   ALT 39   BILITOT 0.6   ALKPHOS 166*     No results for input(s): PROTIME, INR in the last 72 hours.   No results for input(s): NITRITE, COLORU, PHUR, LABCAST, WBCUA, RBCUA, MUCUS, TRICHOMONAS, YEAST, BACTERIA, CLARITYU, SPECGRAV, LEUKOCYTESUR, UROBILINOGEN, BILIRUBINUR, BLOODU, GLUCOSEU, AMORPHOUS in the last 72 hours. Invalid input(s): KETONESU  No results for input(s): PHART, QUE1JFE, PO2ART in the last 72 hours.     6/30/21 rapid COVID +   Chest imaging was reviewed by me and showed CTPA 6/30 with bilateral opacities, no PE and CXR 7/3/21 with bilateral ASD increased from prior    ASSESSMENT:  · Acute hypoxic respiratory failure  · COVID 19 pneumonia  · S/p AVR  · Pacemaker  · Afib    PLAN:  · Droplet + precautions  Supplemental oxygen to maintain SaO2 >92%; wean as tolerated   · Decadron 6 mg IV daily  · Start Remdesevir D#1, close monitoring of renal and LFTs  · Received tocilizumab x1  · The pt is on therapeutic lovenox pending a LE doppler for a swollen LLE    Thank you Pete Baumann, * for this consult

## 2021-07-04 NOTE — PLAN OF CARE
Problem: Airway Clearance - Ineffective  Goal: Achieve or maintain patent airway  7/4/2021 1456 by Leonard Palencia RN  Outcome: Ongoing  7/4/2021 0345 by Edwin Sharma RN  Outcome: Ongoing     Problem: Gas Exchange - Impaired  Goal: Absence of hypoxia  7/4/2021 1456 by Leonard Palencia RN  Outcome: Ongoing  7/4/2021 0345 by Edwin Sharma RN  Outcome: Ongoing  Goal: Promote optimal lung function  7/4/2021 1456 by Leonard Palencia RN  Outcome: Ongoing  7/4/2021 0345 by Edwin Sharma RN  Outcome: Ongoing     Problem: Breathing Pattern - Ineffective  Goal: Ability to achieve and maintain a regular respiratory rate  7/4/2021 1456 by Leonard Palencia RN  Outcome: Ongoing  7/4/2021 0345 by Edwin Sharma RN  Outcome: Ongoing     Problem:  Body Temperature -  Risk of, Imbalanced  Goal: Ability to maintain a body temperature within defined limits  7/4/2021 1456 by Leonard Palencia RN  Outcome: Ongoing  7/4/2021 0345 by Edwin Sharma RN  Outcome: Ongoing  Goal: Will regain or maintain usual level of consciousness  7/4/2021 1456 by Leonard Palencia RN  Outcome: Ongoing  7/4/2021 0345 by Edwin Sharma RN  Outcome: Ongoing  Goal: Complications related to the disease process, condition or treatment will be avoided or minimized  7/4/2021 1456 by Leonard Palencia RN  Outcome: Ongoing  7/4/2021 0345 by Edwin Sharma RN  Outcome: Ongoing     Problem: Isolation Precautions - Risk of Spread of Infection  Goal: Prevent transmission of infection  7/4/2021 1456 by Leonard Palencia RN  Outcome: Ongoing  7/4/2021 0345 by Edwin Sharma RN  Outcome: Ongoing     Problem: Nutrition Deficits  Goal: Optimize nutritional status  7/4/2021 1456 by Leonard Palencia RN  Outcome: Ongoing  7/4/2021 0345 by Edwin Sharma RN  Outcome: Ongoing     Problem: Risk for Fluid Volume Deficit  Goal: Maintain normal heart rhythm  7/4/2021 1456 by Leonard Palencia RN  Outcome: Ongoing  7/4/2021 0345 by Terri Sanz RN  Outcome: Ongoing  Goal: Maintain absence of muscle cramping  7/4/2021 1456 by Joey Sanon RN  Outcome: Ongoing  7/4/2021 0345 by Terri Sanz RN  Outcome: Ongoing  Goal: Maintain normal serum potassium, sodium, calcium, phosphorus, and pH  7/4/2021 1456 by Joey Sanon RN  Outcome: Ongoing  7/4/2021 0345 by Terri Sanz RN  Outcome: Ongoing     Problem: Loneliness or Risk for Loneliness  Goal: Demonstrate positive use of time alone when socialization is not possible  7/4/2021 1456 by Joey Sanon RN  Outcome: Ongoing  7/4/2021 0345 by Terri Sanz RN  Outcome: Ongoing     Problem: Fatigue  Goal: Verbalize increase energy and improved vitality  7/4/2021 1456 by Joey Sanon RN  Outcome: Ongoing  7/4/2021 0345 by Terri Sanz RN  Outcome: Ongoing     Problem: Patient Education: Go to Patient Education Activity  Goal: Patient/Family Education  7/4/2021 1456 by Joey Sanon RN  Outcome: Ongoing  7/4/2021 0345 by Terri Sanz RN  Outcome: Ongoing     Problem: Falls - Risk of:  Goal: Will remain free from falls  Description: Will remain free from falls  Outcome: Ongoing  Goal: Absence of physical injury  Description: Absence of physical injury  Outcome: Ongoing

## 2021-07-04 NOTE — H&P
Hospital Medicine History & Physical      PCP: Venita Richards MD    Date of Admission: 7/3/2021    Date of Service: Pt seen/examined on 7/4/2021     Chief Complaint:    Chief Complaint   Patient presents with    Shortness of Breath     COVID positive and low sat at home- in 80's.  here on room air was 87% and [placed on  oxygen at 3 liters and sat 96%       History Of Present Illness: The patient is a 54 y.o. female with COPD, depression, anxiety, s/p AVR, s/p pacemaker placement who presents to [facility] with c/o shortness of breath and hypoxia. Admitted to [facility] 6/30-7/1 for COVID pneumonia. Patient reports she has not been feeling well for the last 10 days. She reports she's had fevers, cough, shortness of breath. She feels weak and fatigued. She has no appetite and has loss of taste/smell. She has not been vaccinated. Patient with low grade temps and hypoxia. She is requiring 3 L O2. Labs with hypokalemia, elevated Alk phos and elevated AST. CXR with bilateral evolving atypical viral pneumonitis. Admitted to med-surg. Pulmonology consulted. Past Medical History:        Diagnosis Date    Anxiety and depression     Aortic valve disease 2016    Arthritis     COPD (chronic obstructive pulmonary disease) ([address]     COVID-19 06/30/2021    History of blood transfusion     Pacemaker     Medtronic       Past Surgical History:        Procedure Laterality Date    BACK SURGERY      rods/screw L4,5,6/ DJD    CARDIAC SURGERY  2017    aortic valve replacement and hole in heart    FOREARM SURGERY Left 1/4/2021    OPEN REDUCTION INTERNAL FIXATION LEFT DISTAL RADIUS performed by Dionicia Frankel, MD at [facility]      screws and pins    PACEMAKER PLACEMENT  2017    medtronic model # [id number]    SINUS SURGERY         Medications Prior to Admission:    Prior to Admission medications    Medication Sig Start Date End Date Taking?  Authorizing Provider guaiFENesin-dextromethorphan (ROBITUSSIN DM) 100-10 MG/5ML syrup Take 5 mLs by mouth 3 times daily as needed for Cough 7/1/21 7/11/21 Yes JOSE Roe   Biotin 5 MG TBDP Take 1 tablet by mouth daily 10/24/20  Yes Historical Provider, MD   Cholecalciferol 50 MCG (2000 UT) CAPS 2,000 Units daily    Yes Historical Provider, MD   oxyCODONE-acetaminophen (PERCOCET) 7.5-325 MG per tablet Take 1 tablet by mouth every 4 hours as needed for Pain . Yes Historical Provider, MD   citalopram (CELEXA) 10 MG tablet Take 10 mg by mouth daily   Yes Historical Provider, MD   Multiple Vitamins-Minerals (THERAPEUTIC MULTIVITAMIN-MINERALS) tablet Take 1 tablet by mouth daily   Yes Historical Provider, MD       Allergies:  Compazine [prochlorperazine maleate], Hydrocodone, Hydrocodone-acetaminophen, Hydrocodone-acetaminophen, Ibuprofen, Pregabalin, and Sulfamethoxazole-trimethoprim    Social History:    TOBACCO:   reports that she has never smoked. She has never used smokeless tobacco.  ETOH:   reports current alcohol use. Family History:   Positive as follows:        Problem Relation Age of Onset    No Known Problems Mother     Heart Attack Father        REVIEW OF SYSTEMS:       Constitutional: Negative for fever + fatigue, poor appetite, no taste/smell, malaise  Respiratory: + dyspnea, cough   Cardiovascular: Negative for chest pain   Gastrointestinal: Negative for vomiting, + diarrhea   Genitourinary: Negative for hematuria   Musculoskeletal: Negative for arthralgias + weakness  Skin: Negative for rash   Neurological: Negative for syncope   Hematological: Negative for adenopathy   Psychiatric/Behavorial: Negative for anxiety    PHYSICAL EXAM:    /77   Pulse 63   Temp 97 °F (36.1 °C) (Oral)   Resp 16   Ht 5' 6\" (1.676 m)   Wt 151 lb 1.6 oz (68.5 kg)   SpO2 95%   Breastfeeding No   BMI 24.39 kg/m²     Gen: No distress. Alert. Eyes: PERRL. No sclera icterus. No conjunctival injection. ENT: No discharge. Pharynx clear. Neck: Trachea midline. Resp: No accessory muscle use. + RLL, LLL crackles. + mild exp wheezes. No rhonchi. CV: Regular rate. Regular rhythm. + murmur. No rub. + LLE edema. + pacemaker  GI: Non-tender. Non-distended. Normal bowel sounds. No hernia. Skin: Warm and dry. No nodule on exposed extremities. No rash on exposed extremities. M/S: No cyanosis. No joint deformity. No clubbing. Neuro: Awake. Grossly nonfocal    Psych: Oriented x 3. No anxiety or agitation. CBC:   Recent Labs     07/03/21  2355   WBC 6.1   HGB 10.8*   HCT 32.0*   MCV 90.0        BMP:   Recent Labs     07/03/21  2355      K 3.2*      CO2 27   BUN 7   CREATININE <0.5*     LIVER PROFILE:   Recent Labs     07/03/21  2355   AST 62*   ALT 39   BILITOT 0.6   ALKPHOS 166*       CARDIAC ENZYMES  Recent Labs     07/03/21  2355   TROPONINI <0.01       U/A:    Lab Results   Component Value Date    COLORU Yellow 05/13/2019    CLARITYU SL CLOUDY 05/13/2019    SPECGRAV 1.025 05/13/2019    LEUKOCYTESUR Negative 05/13/2019    BLOODU Negative 05/13/2019    GLUCOSEU Negative 05/13/2019       CULTURES  Results for Ethel Lockhart (MRN [id number]) as of 7/4/2021 10:07   Ref. Range 6/30/2021 16:22   SARS-CoV-2, NAAT Latest Ref Range: Not Detected  DETECTED (AA)       EKG:  I have reviewed the EKG with the following interpretation:   N/A    RADIOLOGY  XR CHEST (2 VW)   Final Result   Bilateral evolving atypical viral pneumonitis. CT chest 6/30/2021  Negative for acute pulmonary embolism.       Bilateral peripheral pulmonary opacities suggesting COVID pneumonia in this   COVID positive patient       Jigna Stover MD have reviewed the chart on [address] [place] [address] and personally interviewed and examined patient, reviewed the data (labs and imaging) and after discussion with my PA formulated the plan. Agree with note with the following edits.     HPI:     54-year-old white female with a history of COPD, depression, anxiety, status post aortic valve replacement, status post pacemaker who came to the emergency room with complaints of cough and shortness of breath. Patient admitted to hospital on June 30 through July 1 for Covid pneumonia. She was not hypoxic at that time. After going home she felt a little weaker and her oxygen level dropped. She is short of breath and has a cough. When I saw her she was 96% on 3 L. She she has noted some swelling of the left leg. I reviewed the patient's Past Medical History, Past Surgical History, Medications, and Allergies. Physical exam:    /77   Pulse 63   Temp 97 °F (36.1 °C) (Oral)   Resp 16   Ht 5' 6\" (1.676 m)   Wt 151 lb 1.6 oz (68.5 kg)   SpO2 95%   Breastfeeding No   BMI 24.39 kg/m²     Gen: No distress. Alert. Eyes: PERRL. No sclera icterus. No conjunctival injection. ENT: No discharge. Pharynx clear. Neck: Trachea midline. Normal thyroid. Resp: No accessory muscle use. Bibasilar crackles. No wheezes. No rhonchi. No dullness on percussion. CV: Regular rate. Regular rhythm. ESM aortic area, no rub. Mild edema of the left leg when compared to the right leg. GI: Non-tender. Non-distended. No masses. No organomegaly. Normal bowel sounds. No hernia. Skin: Warm and dry. No nodule on exposed extremities. No rash on exposed extremities. Lymph: No cervical LAD. No supraclavicular LAD. M/S: No cyanosis. No joint deformity. No clubbing. Neuro: Awake. Reflexes 2+ symmetric bilaterally. Moves all 4 extremities, non focal  Psych: Oriented x 3. No anxiety or agitation. Principal Problem:    Acute respiratory failure due to COVID-19 [facility])  Active Problems:    Heart valve replaced    PAF (paroxysmal atrial fibrillation) ([facility] [address]    Pneumonia due to COVID-19 virus  Resolved Problems:    * No resolved hospital problems. *        ASSESSMENT/PLAN:    Acute respiratory failure due to Covid-oxygen saturation was in the 80s at home.   87% of the time of admission in the ER. COVID 19 infection with viral pneumonia   - She has not been vaccinated.    - she is hypoxic, on 3 L O2.   - has symptoms of cough, SOB, low grade fevers, poor appetite, diarrhea.  - CXR with evolving viral pneumonitis  - pulmonology consulted.      Hypokalemia  - replaced    LLE swelling  - check venous doppler  - changed to full dose Lovenox     Paroxysmal atrial fibrillation  Cardiac pacemaker in place  Status post mitral valve replacement, bioprosthetic  Mild Aortic stenosis  - currently paced rhythm  - she takes no cardiac meds, no AC  - f/w Dr Wil Pradhan at [facility]  - no acute issues noted      COPD  - no AE     Depression  - continue celexa     DVT Prophylaxis: Full dose Lovenox  Diet: ADULT DIET;  Regular  Code Status: Full Code    Emelina YOUNGP-C  7/4/2021     Boni Fairbanks MD 7/4/2021 10:25 AM

## 2021-07-05 ENCOUNTER — APPOINTMENT (OUTPATIENT)
Dept: GENERAL RADIOLOGY | Age: 56
DRG: 177 | End: 2021-07-05
Payer: MEDICARE

## 2021-07-05 LAB
A/G RATIO: 1.3 (ref 1.1–2.2)
ALBUMIN SERPL-MCNC: 3 G/DL (ref 3.4–5)
ALP BLD-CCNC: 124 U/L (ref 40–129)
ALT SERPL-CCNC: 33 U/L (ref 10–40)
ANION GAP SERPL CALCULATED.3IONS-SCNC: 10 MMOL/L (ref 3–16)
AST SERPL-CCNC: 36 U/L (ref 15–37)
BASE EXCESS ARTERIAL: 3.9 MMOL/L (ref -3–3)
BASOPHILS ABSOLUTE: 0 K/UL (ref 0–0.2)
BASOPHILS RELATIVE PERCENT: 0.1 %
BILIRUB SERPL-MCNC: 0.4 MG/DL (ref 0–1)
BUN BLDV-MCNC: 12 MG/DL (ref 7–20)
CALCIUM SERPL-MCNC: 7.8 MG/DL (ref 8.3–10.6)
CARBOXYHEMOGLOBIN ARTERIAL: 0.3 % (ref 0–1.5)
CHLORIDE BLD-SCNC: 102 MMOL/L (ref 99–110)
CO2: 24 MMOL/L (ref 21–32)
CREAT SERPL-MCNC: <0.5 MG/DL (ref 0.6–1.1)
EOSINOPHILS ABSOLUTE: 0 K/UL (ref 0–0.6)
EOSINOPHILS RELATIVE PERCENT: 0 %
GFR AFRICAN AMERICAN: >60
GFR NON-AFRICAN AMERICAN: >60
GLOBULIN: 2.3 G/DL
GLUCOSE BLD-MCNC: 128 MG/DL (ref 70–99)
HCO3 ARTERIAL: 27 MMOL/L (ref 21–29)
HCT VFR BLD CALC: 29.6 % (ref 36–48)
HEMOGLOBIN, ART, EXTENDED: 11.4 G/DL (ref 12–16)
HEMOGLOBIN: 10.3 G/DL (ref 12–16)
LYMPHOCYTES ABSOLUTE: 0.4 K/UL (ref 1–5.1)
LYMPHOCYTES RELATIVE PERCENT: 8.2 %
MAGNESIUM: 2.6 MG/DL (ref 1.8–2.4)
MCH RBC QN AUTO: 31.1 PG (ref 26–34)
MCHC RBC AUTO-ENTMCNC: 34.8 G/DL (ref 31–36)
MCV RBC AUTO: 89.4 FL (ref 80–100)
METHEMOGLOBIN ARTERIAL: 0.3 %
MONOCYTES ABSOLUTE: 0.3 K/UL (ref 0–1.3)
MONOCYTES RELATIVE PERCENT: 6.1 %
NEUTROPHILS ABSOLUTE: 4 K/UL (ref 1.7–7.7)
NEUTROPHILS RELATIVE PERCENT: 85.6 %
O2 SAT, ARTERIAL: 92.9 %
O2 THERAPY: ABNORMAL
PCO2 ARTERIAL: 35.3 MMHG (ref 35–45)
PDW BLD-RTO: 14.7 % (ref 12.4–15.4)
PH ARTERIAL: 7.5 (ref 7.35–7.45)
PLATELET # BLD: 303 K/UL (ref 135–450)
PMV BLD AUTO: 7.3 FL (ref 5–10.5)
PO2 ARTERIAL: 59.1 MMHG (ref 75–108)
POTASSIUM REFLEX MAGNESIUM: 3.4 MMOL/L (ref 3.5–5.1)
RBC # BLD: 3.31 M/UL (ref 4–5.2)
SODIUM BLD-SCNC: 136 MMOL/L (ref 136–145)
TCO2 ARTERIAL: 28.1 MMOL/L
TOTAL PROTEIN: 5.3 G/DL (ref 6.4–8.2)
WBC # BLD: 4.6 K/UL (ref 4–11)

## 2021-07-05 PROCEDURE — 36415 COLL VENOUS BLD VENIPUNCTURE: CPT

## 2021-07-05 PROCEDURE — 83735 ASSAY OF MAGNESIUM: CPT

## 2021-07-05 PROCEDURE — 6360000002 HC RX W HCPCS: Performed by: NURSE PRACTITIONER

## 2021-07-05 PROCEDURE — 2000000000 HC ICU R&B

## 2021-07-05 PROCEDURE — 99291 CRITICAL CARE FIRST HOUR: CPT | Performed by: INTERNAL MEDICINE

## 2021-07-05 PROCEDURE — 94761 N-INVAS EAR/PLS OXIMETRY MLT: CPT

## 2021-07-05 PROCEDURE — 71045 X-RAY EXAM CHEST 1 VIEW: CPT

## 2021-07-05 PROCEDURE — 6370000000 HC RX 637 (ALT 250 FOR IP): Performed by: HOSPITALIST

## 2021-07-05 PROCEDURE — 99233 SBSQ HOSP IP/OBS HIGH 50: CPT | Performed by: INTERNAL MEDICINE

## 2021-07-05 PROCEDURE — 2580000003 HC RX 258: Performed by: INTERNAL MEDICINE

## 2021-07-05 PROCEDURE — 85025 COMPLETE CBC W/AUTO DIFF WBC: CPT

## 2021-07-05 PROCEDURE — 2580000003 HC RX 258: Performed by: HOSPITALIST

## 2021-07-05 PROCEDURE — 2500000003 HC RX 250 WO HCPCS: Performed by: INTERNAL MEDICINE

## 2021-07-05 PROCEDURE — 82803 BLOOD GASES ANY COMBINATION: CPT

## 2021-07-05 PROCEDURE — 6360000002 HC RX W HCPCS: Performed by: HOSPITALIST

## 2021-07-05 PROCEDURE — 80053 COMPREHEN METABOLIC PANEL: CPT

## 2021-07-05 PROCEDURE — 2700000000 HC OXYGEN THERAPY PER DAY

## 2021-07-05 RX ORDER — POTASSIUM CHLORIDE 750 MG/1
10 TABLET, EXTENDED RELEASE ORAL ONCE
Status: COMPLETED | OUTPATIENT
Start: 2021-07-05 | End: 2021-07-05

## 2021-07-05 RX ADMIN — POTASSIUM CHLORIDE 10 MEQ: 750 TABLET, EXTENDED RELEASE ORAL at 08:34

## 2021-07-05 RX ADMIN — ENOXAPARIN SODIUM 70 MG: 80 INJECTION SUBCUTANEOUS at 08:34

## 2021-07-05 RX ADMIN — DEXAMETHASONE SODIUM PHOSPHATE 6 MG: 10 INJECTION, SOLUTION INTRAMUSCULAR; INTRAVENOUS at 01:31

## 2021-07-05 RX ADMIN — ENOXAPARIN SODIUM 70 MG: 80 INJECTION SUBCUTANEOUS at 20:15

## 2021-07-05 RX ADMIN — SODIUM CHLORIDE, PRESERVATIVE FREE 20 ML: 5 INJECTION INTRAVENOUS at 21:00

## 2021-07-05 RX ADMIN — REMDESIVIR 100 MG: 100 INJECTION, POWDER, LYOPHILIZED, FOR SOLUTION INTRAVENOUS at 13:30

## 2021-07-05 RX ADMIN — Medication 1 TABLET: at 08:34

## 2021-07-05 RX ADMIN — SODIUM CHLORIDE, PRESERVATIVE FREE 10 ML: 5 INJECTION INTRAVENOUS at 08:35

## 2021-07-05 RX ADMIN — CITALOPRAM HYDROBROMIDE 10 MG: 20 TABLET ORAL at 08:34

## 2021-07-05 RX ADMIN — Medication 2000 UNITS: at 08:33

## 2021-07-05 ASSESSMENT — PAIN SCALES - GENERAL
PAINLEVEL_OUTOF10: 0

## 2021-07-05 NOTE — PROGRESS NOTES
Assessment completed see flowsheet. Patient sitting up in bedside chair, appears to be in no distress at this time. Patient eating dinner. No concerns or questions at this time. Safety checks completed, call light within reach.  Will continue to monitor

## 2021-07-05 NOTE — PROGRESS NOTES
07/03/21  2355 07/05/21  0410    136   K 3.2* 3.4*    102   CO2 27 24   BUN 7 12   CREATININE <0.5* <0.5*     LIVER PROFILE:   Recent Labs     07/03/21 2355 07/05/21  0410   AST 62* 36   ALT 39 33   BILITOT 0.6 0.4   ALKPHOS 166* 80       Microbiology:  6/30 rapid Covid detected      Imaging:  Chest x-ray 7/5 imaging reviewed by me and showed  Bilateral ASD - worse LL       ASSESSMENT:  · Acute hypoxic respiratory failure  · COVID 19 pneumonia  · Electrolytes disorder   · S/p AVR  · Pacemaker  · Afib     PLAN:  · HFNC/Vapotherm for life-threatening acute hypoxemic respiratory failure and titrate to maintain SaO2 >92%  · Droplet + precautions  · Decadron 6 mg IV daily  · Remdesevir D#2, close monitoring of renal and LFTs  · Received tocilizumab x1  · The pt is on therapeutic lovenox pending a LE doppler for a swollen LLE  · Electrolytes replacement             Total critical care time caring for this patient with life threatening, unstable organ failure, including direct patient contact, management of life support systems, review of data including imaging and labs, discussions with other team members and physicians is 32 minutes, excluding procedures.

## 2021-07-05 NOTE — PROGRESS NOTES
Shift assessment completed, see flow sheet. RASS 0. Following commands. Vapotherm 40/80. SpO2 93%. Respirations are easy, even, and unlabored. Bilateral lung sounds diminished. VSS  Ventricular paced on the monitor. PIV, WNL, saline locked and alcohol capped. All lines and monitoring devices in place. [place] is patent and secured. Bed in lowest position with wheels locked. No needs expressed at this time.  Will continue to monitor

## 2021-07-05 NOTE — CARE COORDINATION
Case Management Assessment  Initial Evaluation      Patient Name: Kam Gregg  YOB: 1965  Diagnosis: Pneumonia due to COVID-19 virus [U07.1, J12.82]  Date / Time: 7/3/2021 11:04 PM    Admission status/Date: 07/04/2021 Inpatient  Chart Reviewed: Yes      Patient Nelia Mejias: No -pt did not answer bedside phone and is in covid isolation   Family Interviewed:  Yes - pt's daughter Jazzy Wellington at [phone]    Hospitalization in the last 30 days:  Yes from 06/30/2021-07/01/2021 with COVID-19. Pt discharged home on 07/01/2021      Health Care Decision Maker :   Primary Decision Maker: Frida Freitas - Child - [phone]    Secondary Decision Maker: Karely Baker - Child - [phone]    (CM - must 1st enter selection under Navigator - emergency contact- Health Care Decision Maker Relationship and pick relationship)   Who do you trust or have selected to make healthcare decisions for you      Met with: pt's daughter Abril Vo conducted  (bedside/phone): phone call     Current PCP: Pt's daughter unsure of pt's PCP.  Pt has Ara Thao MD listed on the facesheet as her PCP    Financial  [place] Medicare and Medicaid  Precert required for SNF : Y          3 night stay required -  N    ADLS  Support Systems/Care Needs: Family Members  Transportation: self    Meal Preparation: self    Housing  Living Arrangements: Pt lives at home alone  Steps: 1  Intent for return to present living arrangements: Yes per pt's daughter Jazzy Wellington   Identified Issues: [id number] [address] [facility] with [address] : No Agency:(Services)  Type of Home Care Services: None  Passport/Waiver : No  :                      Phone Number:    Passport/Waiver Services: n/a          Durable Medical Equiptment   DME Provider: n/a  Equipment:   Walker___Cane___RTS___ BSC___Shower Chair___Hospital Bed___W/C____Other________  02 at ____Liter(s)---wears(frequency)_______ [facility] ___ CPAP___ BiPap___

## 2021-07-05 NOTE — FLOWSHEET NOTE
07/04/21 3607   Oxygen Therapy   SpO2 (!) 87 %   O2 Device Nasal cannula   O2 Flow Rate (L/min) 4 L/min     Patient's oxygen increased to 6L/min to maintain O2 saturations above 92%.

## 2021-07-05 NOTE — PROGRESS NOTES
Received patient from [place] [id number], patient refused chlorhexidine bath, patient appears to be anxious. Patient satting low 90's upon arrival. Explained and oriented to surroundings.  No questions voiced at this time

## 2021-07-05 NOTE — PROGRESS NOTES
Internal Medicine ICU Progress Note      Events of Last 24 hours:     Transferred to ICU for worsening respiratory failure. Was placed on 1% Vapotherm. Presently on 60% on Vapotherm. Appears a little more comfortable. Invasive Lines: PIV      MV:  n/a    Recent Labs     21  0225   PHART 7.501*   YQD4FPF 35.3   PO2ART 59.1*       MV Settings:     / / /FiO2 : (S) 60 %    IV:   sodium chloride 100 mL (21 1404)       Vitals:  Temp  Av.1 °F (36.7 °C)  Min: 97.5 °F (36.4 °C)  Max: 98.6 °F (37 °C)  Pulse  Av  Min: 61  Max: 74  BP  Min: 112/65  Max: 129/73  SpO2  Av.6 %  Min: 86 %  Max: 99 %  FiO2   Av %  Min: 60 %  Max: 100 %  Patient Vitals for the past 4 hrs:   BP Temp Temp src Pulse Resp SpO2   21 1200 121/65 97.5 °F (36.4 °C) Oral 62 16 99 %   21 1100 117/71 -- -- 66 20 97 %   21 1000 112/65 -- -- 66 19 98 %   21 0900 121/83 -- -- 72 18 90 %       CVP:        Intake/Output Summary (Last 24 hours) at 2021 1259  Last data filed at 2021 0915  Gross per 24 hour   Intake 130 ml   Output --   Net 130 ml       EXAM:  General: sick appearing. Eyes: PERRL. No sclera icterus. No conjunctiva injected. ENT: No discharge. Pharynx clear. Neck: Trachea midline. Normal thyroid. Resp: + accessory muscle use. Bilateral crackles. No wheezing. No rhonchi. No dullness on percussion. CV: Regular rate. Regular rhythm. ESM aortic area no rub. LLE edema. No JVD. Palpable pedal pulses. GI: Non-tender. Non-distended. No masses. No organmegaly. Normal bowel sounds. No hernia. Skin: Warm and dry. No nodule on exposed extremities. No rash on exposed extremities. Lymph: No cervical LAD. No supraclavicular LAD. M/S: No cyanosis. No joint deformity. No clubbing. Neuro: Awake. Follows commands. Positive pupils/gag/corneals. Normal pain response. Psych: Oriented to person, place, time. No anxiety or agitation.      Medications:  Scheduled Meds:   dexamethasone  6 mg Intravenous Q24H    Vitamin D  2,000 Units Oral Daily    citalopram  10 mg Oral Daily    therapeutic multivitamin-minerals  1 tablet Oral Daily    sodium chloride flush  5-40 mL Intravenous 2 times per day    enoxaparin  1 mg/kg Subcutaneous BID    remdesivir IVPB  100 mg Intravenous Q24H       PRN Meds:  oxyCODONE-acetaminophen, sodium chloride flush, sodium chloride, ondansetron **OR** ondansetron, polyethylene glycol, acetaminophen **OR** acetaminophen, guaiFENesin-dextromethorphan, sodium chloride    Results:  CBC:   Recent Labs     07/03/21 2355 07/05/21 0410   WBC 6.1 4.6   HGB 10.8* 10.3*   HCT 32.0* 29.6*   MCV 90.0 89.4    303     BMP:   Recent Labs     07/03/21 2355 07/05/21 0410    136   K 3.2* 3.4*    102   CO2 27 24   BUN 7 12   CREATININE <0.5* <0.5*     LIVER PROFILE:   Recent Labs     07/03/21 2355 07/05/21  0410   AST 62* 36   ALT 39 33   BILITOT 0.6 0.4   ALKPHOS 166* 124       Cultures:  COVID 19 detected    Films:    XR CHEST PORTABLE   Final Result   Stable chest.         XR CHEST (2 VW)   Final Result   Bilateral evolving atypical viral pneumonitis. VL Extremity Venous Bilateral    (Results Pending)          Assessment:    Principal Problem:    Acute hypoxemic respiratory failure due to COVID-19 [facility])  Active Problems:    Heart valve replaced    Hypokalemia    PAF (paroxysmal atrial fibrillation) (HCC)    Pneumonia due to COVID-19 virus    Leg edema, left    Acute respiratory failure with hypoxia (HCC)    Electrolyte disorder  Resolved Problems:    * No resolved hospital problems. *         Plan:    Acute respiratory failure due to Covid-oxygen saturation was in the 80s at home. 87% of the time of admission in the ER. COVID 19 infection with viral pneumonia   - She has not been vaccinated.    - sent to ICU for worsening respiratory failure.  On Vapo therm   - has symptoms of cough, SOB, low grade fevers, poor appetite, diarrhea.  - CXR with evolving viral pneumonitis  - pulmonology consulted. - Started on Remdesevir day#2. Monitor LFT and renal     Hypokalemia  - replaced     LLE swelling  - check venous doppler-unable to do till Tuesday due to [address] weekend. - on full dose Lovenox till results are back     Paroxysmal atrial fibrillation  Cardiac pacemaker in place  Status post mitral valve replacement, bioprosthetic  Mild Aortic stenosis  - currently paced rhythm  - she takes no cardiac meds, no AC  - f/w Dr Wil Pradhan at [facility]  - no acute issues noted      COPD  - no AE     Depression  - continue celexa      DVT Prophylaxis: Full dose Lovenox  Diet: ADULT DIET; Regular  Code Status: Full Code      All questions and concerns were addressed to the patient/family. Alternatives to my treatment were discussed. The note was completed using EMR. Every effort was made to ensure accuracy; however, inadvertent computerized transcription errors may be present.          Boni Fairbanks MD 12:59 PM 7/5/2021

## 2021-07-05 NOTE — PROGRESS NOTES
Report given and care transferred to [facility]. Paged pulmonology to make them aware of patient's increase in oxygen demands.

## 2021-07-05 NOTE — FLOWSHEET NOTE
07/04/21 2019   Vital Signs   Temp 98.6 °F (37 °C)   Temp Source Oral   Pulse 70   Heart Rate Source Monitor   Resp 18   /72   BP Location Left upper arm   Patient Position High fowlers   Level of Consciousness Alert (0)   MEWS Score 1   Oxygen Therapy   SpO2 (!) 89 %   O2 Device Nasal cannula   O2 Flow Rate (L/min) 3 L/min     Patient's supplemental oxygen bumped up to 4L at this time. Patient states she feels no more short of breath than she has. Patient took her regularly scheduled medications with no complications, but she did request her Robitussin, and this was given per PRN order. Patient denies other needs at this time. Call light within reach.

## 2021-07-06 ENCOUNTER — APPOINTMENT (OUTPATIENT)
Dept: VASCULAR LAB | Age: 56
DRG: 177 | End: 2021-07-06
Payer: MEDICARE

## 2021-07-06 LAB
ALBUMIN SERPL-MCNC: 2.8 G/DL (ref 3.4–5)
ALP BLD-CCNC: 117 U/L (ref 40–129)
ALT SERPL-CCNC: 32 U/L (ref 10–40)
ANION GAP SERPL CALCULATED.3IONS-SCNC: 9 MMOL/L (ref 3–16)
AST SERPL-CCNC: 28 U/L (ref 15–37)
BASOPHILS ABSOLUTE: 0 K/UL (ref 0–0.2)
BASOPHILS RELATIVE PERCENT: 0.1 %
BILIRUB SERPL-MCNC: 0.4 MG/DL (ref 0–1)
BILIRUBIN DIRECT: <0.2 MG/DL (ref 0–0.3)
BILIRUBIN, INDIRECT: ABNORMAL MG/DL (ref 0–1)
BUN BLDV-MCNC: 17 MG/DL (ref 7–20)
CALCIUM SERPL-MCNC: 7.7 MG/DL (ref 8.3–10.6)
CHLORIDE BLD-SCNC: 102 MMOL/L (ref 99–110)
CO2: 26 MMOL/L (ref 21–32)
CREAT SERPL-MCNC: <0.5 MG/DL (ref 0.6–1.1)
EOSINOPHILS ABSOLUTE: 0 K/UL (ref 0–0.6)
EOSINOPHILS RELATIVE PERCENT: 0 %
GFR AFRICAN AMERICAN: >60
GFR NON-AFRICAN AMERICAN: >60
GLUCOSE BLD-MCNC: 133 MG/DL (ref 70–99)
HCT VFR BLD CALC: 30.3 % (ref 36–48)
HEMOGLOBIN: 10.2 G/DL (ref 12–16)
LYMPHOCYTES ABSOLUTE: 0.4 K/UL (ref 1–5.1)
LYMPHOCYTES RELATIVE PERCENT: 4.4 %
MCH RBC QN AUTO: 30.3 PG (ref 26–34)
MCHC RBC AUTO-ENTMCNC: 33.7 G/DL (ref 31–36)
MCV RBC AUTO: 89.9 FL (ref 80–100)
MONOCYTES ABSOLUTE: 0.3 K/UL (ref 0–1.3)
MONOCYTES RELATIVE PERCENT: 3.1 %
NEUTROPHILS ABSOLUTE: 7.8 K/UL (ref 1.7–7.7)
NEUTROPHILS RELATIVE PERCENT: 92.4 %
PDW BLD-RTO: 14.6 % (ref 12.4–15.4)
PLATELET # BLD: 395 K/UL (ref 135–450)
PMV BLD AUTO: 7.1 FL (ref 5–10.5)
POTASSIUM REFLEX MAGNESIUM: 3.7 MMOL/L (ref 3.5–5.1)
RBC # BLD: 3.38 M/UL (ref 4–5.2)
SODIUM BLD-SCNC: 137 MMOL/L (ref 136–145)
TOTAL PROTEIN: 5.2 G/DL (ref 6.4–8.2)
WBC # BLD: 8.5 K/UL (ref 4–11)

## 2021-07-06 PROCEDURE — 2700000000 HC OXYGEN THERAPY PER DAY

## 2021-07-06 PROCEDURE — 2500000003 HC RX 250 WO HCPCS: Performed by: INTERNAL MEDICINE

## 2021-07-06 PROCEDURE — 2580000003 HC RX 258: Performed by: INTERNAL MEDICINE

## 2021-07-06 PROCEDURE — 99291 CRITICAL CARE FIRST HOUR: CPT | Performed by: INTERNAL MEDICINE

## 2021-07-06 PROCEDURE — 6360000002 HC RX W HCPCS: Performed by: NURSE PRACTITIONER

## 2021-07-06 PROCEDURE — 99233 SBSQ HOSP IP/OBS HIGH 50: CPT | Performed by: INTERNAL MEDICINE

## 2021-07-06 PROCEDURE — 80076 HEPATIC FUNCTION PANEL: CPT

## 2021-07-06 PROCEDURE — 6370000000 HC RX 637 (ALT 250 FOR IP): Performed by: HOSPITALIST

## 2021-07-06 PROCEDURE — 85025 COMPLETE CBC W/AUTO DIFF WBC: CPT

## 2021-07-06 PROCEDURE — 93970 EXTREMITY STUDY: CPT

## 2021-07-06 PROCEDURE — 6360000002 HC RX W HCPCS: Performed by: INTERNAL MEDICINE

## 2021-07-06 PROCEDURE — 6370000000 HC RX 637 (ALT 250 FOR IP): Performed by: INTERNAL MEDICINE

## 2021-07-06 PROCEDURE — 36415 COLL VENOUS BLD VENIPUNCTURE: CPT

## 2021-07-06 PROCEDURE — 80048 BASIC METABOLIC PNL TOTAL CA: CPT

## 2021-07-06 PROCEDURE — 2000000000 HC ICU R&B

## 2021-07-06 PROCEDURE — 2580000003 HC RX 258: Performed by: HOSPITALIST

## 2021-07-06 PROCEDURE — 94761 N-INVAS EAR/PLS OXIMETRY MLT: CPT

## 2021-07-06 PROCEDURE — 6360000002 HC RX W HCPCS: Performed by: HOSPITALIST

## 2021-07-06 RX ORDER — FUROSEMIDE 10 MG/ML
40 INJECTION INTRAMUSCULAR; INTRAVENOUS ONCE
Status: COMPLETED | OUTPATIENT
Start: 2021-07-06 | End: 2021-07-06

## 2021-07-06 RX ADMIN — MUPIROCIN: 20 OINTMENT TOPICAL at 21:39

## 2021-07-06 RX ADMIN — REMDESIVIR 100 MG: 100 INJECTION, POWDER, LYOPHILIZED, FOR SOLUTION INTRAVENOUS at 12:50

## 2021-07-06 RX ADMIN — CITALOPRAM HYDROBROMIDE 10 MG: 20 TABLET ORAL at 08:25

## 2021-07-06 RX ADMIN — Medication 1 TABLET: at 08:25

## 2021-07-06 RX ADMIN — MUPIROCIN: 20 OINTMENT TOPICAL at 09:07

## 2021-07-06 RX ADMIN — OXYCODONE HYDROCHLORIDE AND ACETAMINOPHEN 1 TABLET: 7.5; 325 TABLET ORAL at 23:22

## 2021-07-06 RX ADMIN — ACETAMINOPHEN 650 MG: 325 TABLET ORAL at 12:55

## 2021-07-06 RX ADMIN — ENOXAPARIN SODIUM 30 MG: 30 INJECTION SUBCUTANEOUS at 21:30

## 2021-07-06 RX ADMIN — DEXAMETHASONE SODIUM PHOSPHATE 6 MG: 10 INJECTION, SOLUTION INTRAMUSCULAR; INTRAVENOUS at 00:40

## 2021-07-06 RX ADMIN — DEXAMETHASONE SODIUM PHOSPHATE 6 MG: 10 INJECTION, SOLUTION INTRAMUSCULAR; INTRAVENOUS at 23:22

## 2021-07-06 RX ADMIN — ONDANSETRON HYDROCHLORIDE 4 MG: 2 INJECTION, SOLUTION INTRAMUSCULAR; INTRAVENOUS at 00:43

## 2021-07-06 RX ADMIN — ENOXAPARIN SODIUM 70 MG: 80 INJECTION SUBCUTANEOUS at 08:32

## 2021-07-06 RX ADMIN — FUROSEMIDE 40 MG: 10 INJECTION, SOLUTION INTRAVENOUS at 12:49

## 2021-07-06 RX ADMIN — SODIUM CHLORIDE, PRESERVATIVE FREE 20 ML: 5 INJECTION INTRAVENOUS at 21:40

## 2021-07-06 RX ADMIN — SODIUM CHLORIDE, PRESERVATIVE FREE 10 ML: 5 INJECTION INTRAVENOUS at 08:26

## 2021-07-06 RX ADMIN — Medication 2000 UNITS: at 08:25

## 2021-07-06 ASSESSMENT — PAIN SCALES - GENERAL
PAINLEVEL_OUTOF10: 6
PAINLEVEL_OUTOF10: 0
PAINLEVEL_OUTOF10: 6

## 2021-07-06 NOTE — PROGRESS NOTES
Assessment completed, see flowsheet, patient sitting up in bedside chair, all questions answered, no needs at this time, call light within reach, safety checks completed, will continue to monitor

## 2021-07-06 NOTE — PROGRESS NOTES
07/05/21 2218   Oxygen Therapy/Pulse Ox   O2 Therapy Oxygen humidified   O2 Device Heated high flow cannula   O2 Flow Rate (L/min) 40 L/min   FiO2  70 %   Resp 19   SpO2 96 %   Skin Assessment Clean, dry, & intact

## 2021-07-06 NOTE — PROGRESS NOTES
Internal Medicine ICU Progress Note      Interval history:   Admitted with COVID-19 infection. Patient has not taken vaccination. Transferred to ICU for worsening respiratory failure.   -placed on Vapotherm.    . Appears a little more comfortable. Patient is up on the chair. Remains on high flow oxygen per Vapotherm. FiO2 is down from 75% to 65%, 40 L      Invasive Lines: PIV      MV:  n/a    Recent Labs     21  0225   PHART 7.501*   [id number] 35.3   PO2ART 59.1*       MV Settings:     / / /FiO2 : 65 %    IV:   sodium chloride 100 mL (21 1404)       Vitals:  Temp  Av °F (36.7 °C)  Min: 97.6 °F (36.4 °C)  Max: 98.9 °F (37.2 °C)  Pulse  Av.7  Min: 60  Max: 84  BP  Min: 108/63  Max: 132/86  SpO2  Av.5 %  Min: 90 %  Max: 98 %  FiO2   Av.7 %  Min: 65 %  Max: 75 %  Patient Vitals for the past 4 hrs:   BP Temp Temp src Pulse Resp SpO2   21 1733 -- 97.6 °F (36.4 °C) Oral -- -- --   21 1724 126/72 -- -- 63 21 94 %   21 1700 126/72 -- -- 63 20 96 %   21 1600 -- -- -- 60 25 97 %   21 1500 -- -- -- 70 17 94 %   21 1402 111/66 -- -- 77 26 --   21 1400 111/66 -- -- 84 (!) 31 93 %       CVP:        Intake/Output Summary (Last 24 hours) at 2021 1739  Last data filed at 2021 1403  Gross per 24 hour   Intake 730 ml   Output 300 ml   Net 430 ml       EXAM:  Patient seen in droplet plus precautions  General: Sitting up in the chair, no distress, awake alert and well-oriented  Eyes: PERRL. No sclera icterus. No conjunctiva injected. ENT: No discharge. Pharynx clear. Neck: Trachea midline. Normal thyroid. Resp: no  accessory muscle use. Bilateral crackles. Diminished breath sounds no wheezing. No rhonchi. No dullness on percussion. CV: Regular rate. Regular rhythm. ESM aortic area no rub. LLE edema. No JVD. Palpable pedal pulses. GI: Non-tender. Non-distended. No masses. No organmegaly. Normal bowel sounds. No hernia.   Skin: Warm and dry. No nodule on exposed extremities. No rash on exposed extremities. Lymph: No cervical LAD. No supraclavicular LAD. M/S: No cyanosis. No joint deformity. No clubbing. Neuro: Awake. Follows commands. Positive pupils/gag/corneals. Normal pain response. Psych: Oriented to person, place, time. No anxiety or agitation. Medications:  Scheduled Meds:   mupirocin   Nasal BID    enoxaparin  30 mg Subcutaneous BID    dexamethasone  6 mg Intravenous Q24H    Vitamin D  2,000 Units Oral Daily    citalopram  10 mg Oral Daily    therapeutic multivitamin-minerals  1 tablet Oral Daily    sodium chloride flush  5-40 mL Intravenous 2 times per day    remdesivir IVPB  100 mg Intravenous Q24H       PRN Meds:  oxyCODONE-acetaminophen, sodium chloride flush, sodium chloride, ondansetron **OR** ondansetron, polyethylene glycol, acetaminophen **OR** acetaminophen, guaiFENesin-dextromethorphan, sodium chloride    Results:  CBC:   Recent Labs     07/03/21 2355 07/05/21 0410 07/06/21  0739   WBC 6.1 4.6 8.5   HGB 10.8* 10.3* 10.2*   HCT 32.0* 29.6* 30.3*   MCV 90.0 89.4 89.9    303 395     BMP:   Recent Labs     07/03/21 2355 07/05/21 0410 07/06/21  0740    136 137   K 3.2* 3.4* 3.7    102 102   CO2 27 24 26   BUN 7 12 17   CREATININE <0.5* <0.5* <0.5*     LIVER PROFILE:   Recent Labs     07/03/21 2355 07/05/21 0410 07/06/21  0739   AST 62* 36 28   ALT 39 33 32   BILIDIR  --   --  <0.2   BILITOT 0.6 0.4 0.4   ALKPHOS 166* 124 117       Cultures:  COVID 19 detected    Films:    VL Extremity Venous Bilateral         XR CHEST PORTABLE   Final Result   Stable chest.         XR CHEST (2 VW)   Final Result   Bilateral evolving atypical viral pneumonitis.                 Assessment:    Principal Problem:    Acute hypoxemic respiratory failure due to COVID-19 [facility])  Active Problems:    Heart valve replaced    Hypokalemia    PAF (paroxysmal atrial fibrillation) ([facility] [address]    Pneumonia due to COVID-19 virus    Leg edema, left    Acute respiratory failure with hypoxia (HCC)    Electrolyte disorder  Resolved Problems:    * No resolved hospital problems. *         Plan:    Acute respiratory failure due to Covid 19 infection  -oxygen saturation was in the 80s at home. 87% of the time of admission in the ER. COVID 19 infection with viral pneumonia   - She has not been vaccinated.    - sent to ICU for worsening respiratory failure. On Vapo therm   - has symptoms of cough, SOB, low grade fevers, poor appetite, diarrhea.  - CXR with evolving viral pneumonitis  - pulmonology consulted. -Received Tocilizumab  - Started on Remdesevir - > continue day#3. Monitor LFT and renal function.  -Continue Decadron day #4  -Lasix as needed     Hypokalemia  - replaced     LLE swelling  - check venous doppler-negative for DVT .     Paroxysmal atrial fibrillation  Cardiac pacemaker in place  Status post mitral valve replacement, bioprosthetic  Mild Aortic stenosis  - currently paced rhythm  - she takes no cardiac meds, no AC  - f/w Dr Ceasar Lemus at [facility]  - no acute issues noted      COPD  - no AE     Depression  - continue celexa      DVT Prophylaxis: Lovenox  Diet: ADULT DIET;  Regular  Code Status: Full Code         Saloni Mckeon MD 5:39 PM 7/6/2021

## 2021-07-06 NOTE — PROGRESS NOTES
Pulmonary & Critical Care Medicine ICU Progress Note    CC: COVID-19    Events of Last 24 hours:   Vapotherm 40L 75% FiO2   Feels fatigue and tired    Vascular lines: IV: PIVs            / / /FiO2 : 70 %  Recent Labs     21  0225   PHART 7.501*   [id number] 35.3   PO2ART 59.1*       IV:   sodium chloride 100 mL (21 1404)       Vitals:  Blood pressure 123/74, pulse 62, temperature 98.9 °F (37.2 °C), temperature source Oral, resp. rate 21, height 5' 6\" (1.676 m), weight 151 lb 1.6 oz (68.5 kg), SpO2 93 %, not currently breastfeeding. on Vapotherm  Temp  Av.1 °F (36.7 °C)  Min: 97.5 °F (36.4 °C)  Max: 98.9 °F (37.2 °C)    Intake/Output Summary (Last 24 hours) at 2021 0746  Last data filed at 2021 0000  Gross per 24 hour   Intake 850 ml   Output 800 ml   Net 50 ml     PE:  General: ill appearing    Eyes: PERRL. No sclera icterus. No conjunctival injection. ENT: No discharge. Pharynx clear. Neck: Trachea midline. Normal thyroid. Resp: + Accessory muscle use. +  crackles. No wheezing. No rhonchi. No dullness on percussion. CV: Regular rate. Regular rhythm. No mumur or rub. No edema. GI: Non-tender. Non-distended. No masses. No organomegaly. Normal bowel sounds. No hernia. Skin: Warm and dry. No nodule on exposed extremities. No rash on exposed extremities. Lymph: No cervical LAD. No supraclavicular LAD. M/S: No cyanosis. No joint deformity. No clubbing. Neuro: AAOx3.  Patellar reflexes are symmetric  Psych: No agitation, no anxiety, affect is full     Scheduled Meds:   dexamethasone  6 mg Intravenous Q24H    Vitamin D  2,000 Units Oral Daily    citalopram  10 mg Oral Daily    therapeutic multivitamin-minerals  1 tablet Oral Daily    sodium chloride flush  5-40 mL Intravenous 2 times per day    enoxaparin  1 mg/kg Subcutaneous BID    remdesivir IVPB  100 mg Intravenous Q24H       Data:  CBC:   Recent Labs     21  2355 21  0410   WBC 6.1 4.6   HGB 10.8* 10.3*   HCT 32.0* 29.6*   MCV 90.0 89.4    303     BMP:   Recent Labs     07/03/21  2355 07/05/21  0410    136   K 3.2* 3.4*    102   CO2 27 24   BUN 7 12   CREATININE <0.5* <0.5*     LIVER PROFILE:   Recent Labs     07/03/21  2355 07/05/21  0410   AST 62* 36   ALT 39 33   BILITOT 0.6 0.4   ALKPHOS 166* 80       Microbiology:  6/30 rapid Covid detected      Imaging:  Chest x-ray 7/5 imaging reviewed by me and showed  Bilateral ASD - worse LL     LE doppler 7/6  Right   There is no evidence of deep or superficial venous thrombosis involving the   right lower extremity. There is abnormal pulsatile flow seen in the lower extremity. Left   There is no evidence of deep or superficial venous thrombosis involving the   left lower extremity. There is abnormal pulsatile flow seen in the lower extremity. There are no previous studies for comparison    ASSESSMENT:  · Acute hypoxic respiratory failure  · COVID 19 pneumonia  · Electrolytes disorder   · S/p AVR  · Pacemaker  · Afib     PLAN:  · HFNC/Vapotherm for life-threatening acute hypoxemic respiratory failure and titrate to maintain SaO2 >92%  · Droplet + precautions  · Decadron 6 mg IV daily D#4  · Remdesevir D#3, close monitoring of renal and LFTs  · Received tocilizumab x1  · Lasix 40 mg IV x 1   · Electrolytes replacement   · DVT prophylaxis: Lovenox BID   · MRSA prophylaxis: Bactroban              Total critical care time caring for this patient with life threatening, unstable organ failure, including direct patient contact, management of life support systems, review of data including imaging and labs, discussions with other team members and physicians is 32 minutes, excluding procedures.

## 2021-07-07 LAB
ALBUMIN SERPL-MCNC: 3 G/DL (ref 3.4–5)
ALP BLD-CCNC: 116 U/L (ref 40–129)
ALT SERPL-CCNC: 34 U/L (ref 10–40)
ANION GAP SERPL CALCULATED.3IONS-SCNC: 11 MMOL/L (ref 3–16)
AST SERPL-CCNC: 34 U/L (ref 15–37)
BASOPHILS ABSOLUTE: 0 K/UL (ref 0–0.2)
BASOPHILS RELATIVE PERCENT: 0 %
BILIRUB SERPL-MCNC: 0.4 MG/DL (ref 0–1)
BILIRUBIN DIRECT: <0.2 MG/DL (ref 0–0.3)
BILIRUBIN, INDIRECT: ABNORMAL MG/DL (ref 0–1)
BUN BLDV-MCNC: 20 MG/DL (ref 7–20)
CALCIUM SERPL-MCNC: 8.3 MG/DL (ref 8.3–10.6)
CHLORIDE BLD-SCNC: 99 MMOL/L (ref 99–110)
CO2: 27 MMOL/L (ref 21–32)
CREAT SERPL-MCNC: <0.5 MG/DL (ref 0.6–1.1)
EOSINOPHILS ABSOLUTE: 0 K/UL (ref 0–0.6)
EOSINOPHILS RELATIVE PERCENT: 0 %
GFR AFRICAN AMERICAN: >60
GFR NON-AFRICAN AMERICAN: >60
GLUCOSE BLD-MCNC: 116 MG/DL (ref 70–99)
HCT VFR BLD CALC: 31.1 % (ref 36–48)
HEMOGLOBIN: 10.8 G/DL (ref 12–16)
LYMPHOCYTES ABSOLUTE: 0.9 K/UL (ref 1–5.1)
LYMPHOCYTES RELATIVE PERCENT: 11 %
MCH RBC QN AUTO: 30.7 PG (ref 26–34)
MCHC RBC AUTO-ENTMCNC: 34.7 G/DL (ref 31–36)
MCV RBC AUTO: 88.5 FL (ref 80–100)
MONOCYTES ABSOLUTE: 0.2 K/UL (ref 0–1.3)
MONOCYTES RELATIVE PERCENT: 2 %
NEUTROPHILS ABSOLUTE: 7.4 K/UL (ref 1.7–7.7)
NEUTROPHILS RELATIVE PERCENT: 87 %
PDW BLD-RTO: 14.3 % (ref 12.4–15.4)
PLATELET # BLD: 460 K/UL (ref 135–450)
PLATELET SLIDE REVIEW: ADEQUATE
PMV BLD AUTO: 7.1 FL (ref 5–10.5)
POTASSIUM SERPL-SCNC: 3.6 MMOL/L (ref 3.5–5.1)
RBC # BLD: 3.52 M/UL (ref 4–5.2)
SLIDE REVIEW: ABNORMAL
SODIUM BLD-SCNC: 137 MMOL/L (ref 136–145)
TOTAL PROTEIN: 5.5 G/DL (ref 6.4–8.2)
WBC # BLD: 8.5 K/UL (ref 4–11)

## 2021-07-07 PROCEDURE — 80076 HEPATIC FUNCTION PANEL: CPT

## 2021-07-07 PROCEDURE — 80048 BASIC METABOLIC PNL TOTAL CA: CPT

## 2021-07-07 PROCEDURE — 6360000002 HC RX W HCPCS: Performed by: INTERNAL MEDICINE

## 2021-07-07 PROCEDURE — 2500000003 HC RX 250 WO HCPCS: Performed by: INTERNAL MEDICINE

## 2021-07-07 PROCEDURE — 2580000003 HC RX 258: Performed by: HOSPITALIST

## 2021-07-07 PROCEDURE — 6370000000 HC RX 637 (ALT 250 FOR IP): Performed by: INTERNAL MEDICINE

## 2021-07-07 PROCEDURE — 2700000000 HC OXYGEN THERAPY PER DAY

## 2021-07-07 PROCEDURE — 6370000000 HC RX 637 (ALT 250 FOR IP): Performed by: HOSPITALIST

## 2021-07-07 PROCEDURE — 99233 SBSQ HOSP IP/OBS HIGH 50: CPT | Performed by: INTERNAL MEDICINE

## 2021-07-07 PROCEDURE — 2580000003 HC RX 258: Performed by: INTERNAL MEDICINE

## 2021-07-07 PROCEDURE — 6360000002 HC RX W HCPCS: Performed by: HOSPITALIST

## 2021-07-07 PROCEDURE — 99291 CRITICAL CARE FIRST HOUR: CPT | Performed by: INTERNAL MEDICINE

## 2021-07-07 PROCEDURE — 36415 COLL VENOUS BLD VENIPUNCTURE: CPT

## 2021-07-07 PROCEDURE — 2000000000 HC ICU R&B

## 2021-07-07 PROCEDURE — 94761 N-INVAS EAR/PLS OXIMETRY MLT: CPT

## 2021-07-07 PROCEDURE — 85025 COMPLETE CBC W/AUTO DIFF WBC: CPT

## 2021-07-07 RX ORDER — POTASSIUM CHLORIDE 750 MG/1
40 TABLET, EXTENDED RELEASE ORAL ONCE
Status: COMPLETED | OUTPATIENT
Start: 2021-07-07 | End: 2021-07-07

## 2021-07-07 RX ORDER — FUROSEMIDE 10 MG/ML
40 INJECTION INTRAMUSCULAR; INTRAVENOUS ONCE
Status: COMPLETED | OUTPATIENT
Start: 2021-07-07 | End: 2021-07-07

## 2021-07-07 RX ADMIN — MUPIROCIN: 20 OINTMENT TOPICAL at 21:06

## 2021-07-07 RX ADMIN — MUPIROCIN: 20 OINTMENT TOPICAL at 07:55

## 2021-07-07 RX ADMIN — SODIUM CHLORIDE, PRESERVATIVE FREE 10 ML: 5 INJECTION INTRAVENOUS at 21:06

## 2021-07-07 RX ADMIN — Medication 2000 UNITS: at 07:55

## 2021-07-07 RX ADMIN — SODIUM CHLORIDE, PRESERVATIVE FREE 10 ML: 5 INJECTION INTRAVENOUS at 07:56

## 2021-07-07 RX ADMIN — REMDESIVIR 100 MG: 100 INJECTION, POWDER, LYOPHILIZED, FOR SOLUTION INTRAVENOUS at 13:03

## 2021-07-07 RX ADMIN — DEXAMETHASONE SODIUM PHOSPHATE 6 MG: 10 INJECTION, SOLUTION INTRAMUSCULAR; INTRAVENOUS at 23:54

## 2021-07-07 RX ADMIN — Medication 1 TABLET: at 07:55

## 2021-07-07 RX ADMIN — POTASSIUM CHLORIDE 40 MEQ: 750 TABLET, EXTENDED RELEASE ORAL at 11:06

## 2021-07-07 RX ADMIN — CITALOPRAM HYDROBROMIDE 10 MG: 20 TABLET ORAL at 07:55

## 2021-07-07 RX ADMIN — GUAIFENESIN AND DEXTROMETHORPHAN 5 ML: 100; 10 SYRUP ORAL at 23:54

## 2021-07-07 RX ADMIN — FUROSEMIDE 40 MG: 10 INJECTION, SOLUTION INTRAVENOUS at 11:06

## 2021-07-07 RX ADMIN — ENOXAPARIN SODIUM 30 MG: 30 INJECTION SUBCUTANEOUS at 07:56

## 2021-07-07 RX ADMIN — ENOXAPARIN SODIUM 30 MG: 30 INJECTION SUBCUTANEOUS at 21:06

## 2021-07-07 ASSESSMENT — PAIN SCALES - GENERAL
PAINLEVEL_OUTOF10: 0

## 2021-07-07 NOTE — PROGRESS NOTES
07/07/21 0824   Oxygen Therapy/Pulse Ox   O2 Therapy Oxygen humidified   O2 Device Heated high flow cannula   O2 Flow Rate (L/min) 30 L/min   FiO2  55 %   Resp 18   SpO2 93 %   Pulse Oximeter Device Mode Continuous   Pulse Oximeter Device Location Finger

## 2021-07-07 NOTE — PROGRESS NOTES
Pulmonary & Critical Care Medicine ICU Progress Note    CC: COVID-19    Events of Last 24 hours:   Vapotherm 30L 55% FiO2   Somewhat better today    Vascular lines: IV: PIVs            / / /FiO2 : 65 %  Recent Labs     21  0225   PHART 7.501*   [id number] 35.3   PO2ART 59.1*       IV:   sodium chloride 100 mL (21 1404)       Vitals:  Blood pressure 113/72, pulse 60, temperature 97.5 °F (36.4 °C), temperature source Oral, resp. rate 16, height 5' 6\" (1.676 m), weight 151 lb 1.6 oz (68.5 kg), SpO2 94 %, not currently breastfeeding. on Vapotherm  Temp  Av.7 °F (36.5 °C)  Min: 97.5 °F (36.4 °C)  Max: 97.9 °F (36.6 °C)    Intake/Output Summary (Last 24 hours) at 2021 0744  Last data filed at 2021 0500  Gross per 24 hour   Intake 510 ml   Output 1450 ml   Net -940 ml     PE:  General: ill appearing    Eyes: PERRL. No sclera icterus. No conjunctival injection. ENT: No discharge. Pharynx clear. Neck: Trachea midline. Normal thyroid. Resp: + Accessory muscle use. Bibasilar crackles. No wheezing. No rhonchi. No dullness on percussion. CV: Regular rate. Regular rhythm. No mumur or rub. No edema. GI: Non-tender. Non-distended. No masses. No organomegaly. Normal bowel sounds. No hernia. Skin: Warm and dry. No nodule on exposed extremities. No rash on exposed extremities. Lymph: No cervical LAD. No supraclavicular LAD. M/S: No cyanosis. No joint deformity. No clubbing. Neuro: AAOx3.  Patellar reflexes are symmetric  Psych: No agitation, no anxiety, affect is full     Scheduled Meds:   mupirocin   Nasal BID    enoxaparin  30 mg Subcutaneous BID    dexamethasone  6 mg Intravenous Q24H    Vitamin D  2,000 Units Oral Daily    citalopram  10 mg Oral Daily    therapeutic multivitamin-minerals  1 tablet Oral Daily    sodium chloride flush  5-40 mL Intravenous 2 times per day    remdesivir IVPB  100 mg Intravenous Q24H       Data:  CBC:   Recent Labs     21  0410 21  0717 07/07/21  0433   WBC 4.6 8.5 8.5   HGB 10.3* 10.2* 10.8*   HCT 29.6* 30.3* 31.1*   MCV 89.4 89.9 88.5    395 460*     BMP:   Recent Labs     07/05/21  0410 07/06/21  0740 07/07/21  0432    137 137   K 3.4* 3.7 3.6    102 99   CO2 24 26 27   BUN 12 17 20   CREATININE <0.5* <0.5* <0.5*     LIVER PROFILE:   Recent Labs     07/05/21  0410 07/06/21  0739 07/07/21  0432   AST 36 28 34   ALT 33 32 34   BILIDIR  --  <0.2 <0.2   BILITOT 0.4 0.4 0.4   ALKPHOS 124 117 116       Microbiology:  6/30 rapid Covid detected      Imaging:  Chest x-ray 7/5 imaging reviewed by me and showed  Bilateral ASD - worse LL     LE doppler 7/6  Right   There is no evidence of deep or superficial venous thrombosis involving the   right lower extremity. There is abnormal pulsatile flow seen in the lower extremity. Left   There is no evidence of deep or superficial venous thrombosis involving the   left lower extremity. There is abnormal pulsatile flow seen in the lower extremity. There are no previous studies for comparison    ASSESSMENT:  · Acute hypoxic respiratory failure  · COVID 19 pneumonia  · Electrolytes disorder   · S/p AVR  · Pacemaker  · Afib     PLAN:  · HFNC/Vapotherm for life-threatening acute hypoxemic respiratory failure and titrate to maintain SaO2 >92%  · Droplet + precautions  · Decadron 6 mg IV daily D#5  · Remdesevir D#4/5, close monitoring of renal and LFTs  · Received tocilizumab x1  · Repeat Lasix 40 mg IV x 1   · Electrolytes replacement   · DVT prophylaxis: Lovenox BID   · MRSA prophylaxis: Bactroban              Total critical care time caring for this patient with life threatening, unstable organ failure, including direct patient contact, management of life support systems, review of data including imaging and labs, discussions with other team members and physicians is 31 minutes, excluding procedures.

## 2021-07-07 NOTE — CARE COORDINATION
INTERDISCIPLINARY PLAN OF CARE CONFERENCE    Date/Time: 7/7/2021 10:18 AM  Completed by: KAYLEE Scales. Case Management      Patient Name:  Yaa Bañuelos  YOB: 1965  Admitting Diagnosis: Pneumonia due to COVID-19 virus [U07.1, J12.82]     Admit Date/Time:  7/3/2021 11:04 PM    Chart reviewed. Interdisciplinary team contacted or reviewed plan related to patient progress and discharge plans. Disciplines included Case Management, Nursing, and Dietitian. Current Status:Ongoing. ICU. Covid isolation. Vapotherm   PT/OT recommendation for discharge plan of care: TBD    Expected D/C Disposition:  TBD    Discharge Plan Comments: Chart review completed. Pt remains in the ICU and is in covid isolation. Pt is requiring Vapotherm. Pt is from home alone and independent prior to admission per initial assessment completed by writer with pt's daughter Soha Hall on 07/05/2021. CM will continue to follow and assist. Please notify CM if needs or concerns arise.      Home O2 in place on admit: No

## 2021-07-07 NOTE — PLAN OF CARE
Problem: Airway Clearance - Ineffective  Goal: Achieve or maintain patent airway  Outcome: Ongoing     Problem: Gas Exchange - Impaired  Goal: Absence of hypoxia  Outcome: Ongoing  Goal: Promote optimal lung function  Outcome: Ongoing     Problem: Breathing Pattern - Ineffective  Goal: Ability to achieve and maintain a regular respiratory rate  Outcome: Ongoing     Problem:  Body Temperature -  Risk of, Imbalanced  Goal: Ability to maintain a body temperature within defined limits  Outcome: Met This Shift  Goal: Will regain or maintain usual level of consciousness  Outcome: Met This Shift  Goal: Complications related to the disease process, condition or treatment will be avoided or minimized  Outcome: Met This Shift     Problem: Isolation Precautions - Risk of Spread of Infection  Goal: Prevent transmission of infection  Outcome: Met This Shift     Problem: Risk for Fluid Volume Deficit  Goal: Maintain normal heart rhythm  Outcome: Met This Shift  Goal: Maintain absence of muscle cramping  Outcome: Met This Shift  Goal: Maintain normal serum potassium, sodium, calcium, phosphorus, and pH  Outcome: Met This Shift     Problem: Loneliness or Risk for Loneliness  Goal: Demonstrate positive use of time alone when socialization is not possible  Outcome: Met This Shift     Problem: Fatigue  Goal: Verbalize increase energy and improved vitality  Outcome: Ongoing     Problem: Patient Education: Go to Patient Education Activity  Goal: Patient/Family Education  Outcome: Ongoing     Problem: Falls - Risk of:  Goal: Will remain free from falls  Description: Will remain free from falls  Outcome: Met This Shift  Goal: Absence of physical injury  Description: Absence of physical injury  Outcome: Met This Shift

## 2021-07-07 NOTE — PROGRESS NOTES
Internal Medicine ICU Progress Note      Interval history:   Admitted with COVID-19 infection. Patient has not taken vaccination. Transferred to ICU for worsening respiratory failure.   -placed on Vapotherm.   Appears a little more comfortable. Patient is up on the chair. Remains on high flow oxygen per Vapotherm. Hypoxemia slightly improved  FiO2 is down from 75% to 65% to 55 %,  O2 40 L--> 30 L      Invasive Lines: PIV      MV:  n/a    Recent Labs     21  0225   PHART 7.501*   [id number] 35.3   PO2ART 59.1*       MV Settings:     / / /FiO2 : 55 %    IV:   sodium chloride 100 mL (21 1404)       Vitals:  Temp  Av.6 °F (36.4 °C)  Min: 97.3 °F (36.3 °C)  Max: 97.9 °F (36.6 °C)  Pulse  Av.4  Min: 60  Max: 73  BP  Min: 98/67  Max: 134/91  SpO2  Av.3 %  Min: 91 %  Max: 99 %  FiO2   Av.8 %  Min: 55 %  Max: 65 %  Patient Vitals for the past 4 hrs:   BP Temp Temp src Pulse Resp SpO2   21 1700 106/68 97.6 °F (36.4 °C) Oral 71 21 92 %   21 1600 113/70 -- -- 63 21 93 %   21 1500 117/71 -- -- 68 17 98 %   21 1400 126/76 -- -- 69 15 93 %       CVP:        Intake/Output Summary (Last 24 hours) at 2021 1755  Last data filed at 2021 1355  Gross per 24 hour   Intake 990 ml   Output 1450 ml   Net -460 ml       EXAM:  Patient seen in droplet plus precautions  General: Sitting up in the chair, no distress, awake alert and well-oriented  Eyes: PERRL. No sclera icterus. No conjunctiva injected. ENT: No discharge. Pharynx clear. Neck: Trachea midline. Normal thyroid. Resp: no  accessory muscle use. Bilateral crackles. Diminished breath sounds no wheezing. No rhonchi. No dullness on percussion. CV: Regular rate. Regular rhythm. ESM aortic area no rub. LLE edema. No JVD. Palpable pedal pulses. GI: Non-tender. Non-distended. No masses. No organmegaly. Normal bowel sounds. No hernia. Skin: Warm and dry. No nodule on exposed extremities.  No rash on exposed [facility])    Electrolyte disorder  Resolved Problems:    * No resolved hospital problems. *         Plan:    Acute respiratory failure due to Covid 19 infection  -oxygen saturation was in the 80s at home. 87% of the time of admission in the ER. COVID 19 infection with viral pneumonia   - She has not been vaccinated.    - sent to ICU for worsening respiratory failure. On Vapo therm   - has symptoms of cough, SOB, low grade fevers, poor appetite, diarrhea.  - CXR with evolving viral pneumonitis  - pulmonology consulted. -Received Tocilizumab  - Started on Remdesevir - > continue day#4. Monitor LFT and renal function.  -Continue Decadron day #5  -Lasix as needed     Hypokalemia  - replaced     LLE swelling  - checked venous doppler-negative for DVT .     Paroxysmal atrial fibrillation  Cardiac pacemaker in place  Status post mitral valve replacement, bioprosthetic  Mild Aortic stenosis  - currently paced rhythm  - she takes no cardiac meds, no AC  - f/w Dr Orlando Barron at [facility]  - no acute issues noted      COPD  - no AE     Depression  - continue celexa      DVT Prophylaxis: Lovenox  Diet: ADULT DIET;  Regular  Code Status: Full Code         Caro Vuong MD 5:55 PM 7/7/2021

## 2021-07-08 LAB
ALBUMIN SERPL-MCNC: 3 G/DL (ref 3.4–5)
ALP BLD-CCNC: 113 U/L (ref 40–129)
ALT SERPL-CCNC: 45 U/L (ref 10–40)
ANION GAP SERPL CALCULATED.3IONS-SCNC: 11 MMOL/L (ref 3–16)
AST SERPL-CCNC: 48 U/L (ref 15–37)
BASOPHILS ABSOLUTE: 0 K/UL (ref 0–0.2)
BASOPHILS RELATIVE PERCENT: 0 %
BILIRUB SERPL-MCNC: 0.4 MG/DL (ref 0–1)
BILIRUBIN DIRECT: <0.2 MG/DL (ref 0–0.3)
BILIRUBIN, INDIRECT: ABNORMAL MG/DL (ref 0–1)
BUN BLDV-MCNC: 19 MG/DL (ref 7–20)
CALCIUM SERPL-MCNC: 8.6 MG/DL (ref 8.3–10.6)
CHLORIDE BLD-SCNC: 98 MMOL/L (ref 99–110)
CO2: 28 MMOL/L (ref 21–32)
CREAT SERPL-MCNC: <0.5 MG/DL (ref 0.6–1.1)
EOSINOPHILS ABSOLUTE: 0 K/UL (ref 0–0.6)
EOSINOPHILS RELATIVE PERCENT: 0 %
GFR AFRICAN AMERICAN: >60
GFR NON-AFRICAN AMERICAN: >60
GLUCOSE BLD-MCNC: 119 MG/DL (ref 70–99)
HCT VFR BLD CALC: 32.8 % (ref 36–48)
HEMOGLOBIN: 11.4 G/DL (ref 12–16)
HYPERCHROMASIA: ABNORMAL
LYMPHOCYTES ABSOLUTE: 0.6 K/UL (ref 1–5.1)
LYMPHOCYTES RELATIVE PERCENT: 6 %
MCH RBC QN AUTO: 30.9 PG (ref 26–34)
MCHC RBC AUTO-ENTMCNC: 34.7 G/DL (ref 31–36)
MCV RBC AUTO: 89.2 FL (ref 80–100)
MONOCYTES ABSOLUTE: 0.2 K/UL (ref 0–1.3)
MONOCYTES RELATIVE PERCENT: 2 %
MYELOCYTE PERCENT: 1 %
NEUTROPHILS ABSOLUTE: 9 K/UL (ref 1.7–7.7)
NEUTROPHILS RELATIVE PERCENT: 91 %
PDW BLD-RTO: 14.7 % (ref 12.4–15.4)
PLATELET # BLD: 444 K/UL (ref 135–450)
PLATELET SLIDE REVIEW: ADEQUATE
PMV BLD AUTO: 7 FL (ref 5–10.5)
POLYCHROMASIA: ABNORMAL
POTASSIUM SERPL-SCNC: 4.3 MMOL/L (ref 3.5–5.1)
RBC # BLD: 3.68 M/UL (ref 4–5.2)
SLIDE REVIEW: ABNORMAL
SODIUM BLD-SCNC: 137 MMOL/L (ref 136–145)
TOTAL PROTEIN: 5.2 G/DL (ref 6.4–8.2)
WBC # BLD: 9.8 K/UL (ref 4–11)

## 2021-07-08 PROCEDURE — 85025 COMPLETE CBC W/AUTO DIFF WBC: CPT

## 2021-07-08 PROCEDURE — 2700000000 HC OXYGEN THERAPY PER DAY

## 2021-07-08 PROCEDURE — 2580000003 HC RX 258: Performed by: HOSPITALIST

## 2021-07-08 PROCEDURE — 6360000002 HC RX W HCPCS: Performed by: HOSPITALIST

## 2021-07-08 PROCEDURE — 2000000000 HC ICU R&B

## 2021-07-08 PROCEDURE — 6370000000 HC RX 637 (ALT 250 FOR IP): Performed by: INTERNAL MEDICINE

## 2021-07-08 PROCEDURE — 6360000002 HC RX W HCPCS: Performed by: INTERNAL MEDICINE

## 2021-07-08 PROCEDURE — 80048 BASIC METABOLIC PNL TOTAL CA: CPT

## 2021-07-08 PROCEDURE — 94761 N-INVAS EAR/PLS OXIMETRY MLT: CPT

## 2021-07-08 PROCEDURE — 99233 SBSQ HOSP IP/OBS HIGH 50: CPT | Performed by: INTERNAL MEDICINE

## 2021-07-08 PROCEDURE — 2580000003 HC RX 258: Performed by: INTERNAL MEDICINE

## 2021-07-08 PROCEDURE — 2500000003 HC RX 250 WO HCPCS: Performed by: INTERNAL MEDICINE

## 2021-07-08 PROCEDURE — 6370000000 HC RX 637 (ALT 250 FOR IP): Performed by: HOSPITALIST

## 2021-07-08 PROCEDURE — 80076 HEPATIC FUNCTION PANEL: CPT

## 2021-07-08 PROCEDURE — 99291 CRITICAL CARE FIRST HOUR: CPT | Performed by: INTERNAL MEDICINE

## 2021-07-08 PROCEDURE — 36415 COLL VENOUS BLD VENIPUNCTURE: CPT

## 2021-07-08 RX ORDER — FUROSEMIDE 10 MG/ML
40 INJECTION INTRAMUSCULAR; INTRAVENOUS ONCE
Status: COMPLETED | OUTPATIENT
Start: 2021-07-08 | End: 2021-07-08

## 2021-07-08 RX ORDER — ASPIRIN 81 MG/1
81 TABLET, CHEWABLE ORAL DAILY
Status: DISCONTINUED | OUTPATIENT
Start: 2021-07-08 | End: 2021-07-12 | Stop reason: HOSPADM

## 2021-07-08 RX ADMIN — REMDESIVIR 100 MG: 100 INJECTION, POWDER, LYOPHILIZED, FOR SOLUTION INTRAVENOUS at 13:37

## 2021-07-08 RX ADMIN — ENOXAPARIN SODIUM 30 MG: 30 INJECTION SUBCUTANEOUS at 07:50

## 2021-07-08 RX ADMIN — CITALOPRAM HYDROBROMIDE 10 MG: 20 TABLET ORAL at 07:50

## 2021-07-08 RX ADMIN — DEXAMETHASONE SODIUM PHOSPHATE 6 MG: 10 INJECTION, SOLUTION INTRAMUSCULAR; INTRAVENOUS at 23:55

## 2021-07-08 RX ADMIN — Medication 1 TABLET: at 07:50

## 2021-07-08 RX ADMIN — ASPIRIN 81 MG: 81 TABLET, CHEWABLE ORAL at 09:40

## 2021-07-08 RX ADMIN — SODIUM CHLORIDE, PRESERVATIVE FREE 10 ML: 5 INJECTION INTRAVENOUS at 21:17

## 2021-07-08 RX ADMIN — FUROSEMIDE 40 MG: 10 INJECTION, SOLUTION INTRAVENOUS at 09:40

## 2021-07-08 RX ADMIN — MUPIROCIN: 20 OINTMENT TOPICAL at 07:51

## 2021-07-08 RX ADMIN — Medication 2000 UNITS: at 07:50

## 2021-07-08 RX ADMIN — GUAIFENESIN AND DEXTROMETHORPHAN 5 ML: 100; 10 SYRUP ORAL at 22:29

## 2021-07-08 RX ADMIN — OXYCODONE HYDROCHLORIDE AND ACETAMINOPHEN 1 TABLET: 7.5; 325 TABLET ORAL at 23:54

## 2021-07-08 RX ADMIN — MUPIROCIN: 20 OINTMENT TOPICAL at 21:17

## 2021-07-08 RX ADMIN — ENOXAPARIN SODIUM 30 MG: 30 INJECTION SUBCUTANEOUS at 21:17

## 2021-07-08 RX ADMIN — SODIUM CHLORIDE, PRESERVATIVE FREE 10 ML: 5 INJECTION INTRAVENOUS at 07:51

## 2021-07-08 ASSESSMENT — PAIN SCALES - GENERAL
PAINLEVEL_OUTOF10: 0
PAINLEVEL_OUTOF10: 8
PAINLEVEL_OUTOF10: 0

## 2021-07-08 NOTE — PROGRESS NOTES
Internal Medicine ICU Progress Note      Interval history:   Admitted with COVID-19 infection. Patient has not taken vaccination. Transferred to ICU for worsening respiratory failure.   -placed on Vapotherm. 40L -> 30 L   Has been on Vapotherm for several days, hypoxemia slowly improved. . Down to high flow oxygen per nasal cannula today    78  Appears very comfortable, sitting up on the chair. And in no distress. .  Currently on high flow oxygen per nasal cannula 14 L-> 12 L . Invasive Lines: PIV      MV:  n/a    No results for input(s): PHART, AWO1ILR, PO2ART in the last 72 hours. MV Settings:     / / /FiO2 : 70 %    IV:   sodium chloride 100 mL (21 1404)       Vitals:  Temp  Av.8 °F (36.6 °C)  Min: 97.6 °F (36.4 °C)  Max: 98 °F (36.7 °C)  Pulse  Av.2  Min: 62  Max: 84  BP  Min: 98/61  Max: 129/76  SpO2  Av.7 %  Min: 87 %  Max: 100 %  FiO2   Av.1 %  Min: 10 %  Max: 70 %  Patient Vitals for the past 4 hrs:   BP Temp Temp src Pulse Resp SpO2   21 1630 -- 98 °F (36.7 °C) Oral -- -- --   21 1600 117/73 -- -- 66 16 93 %   21 1500 107/69 -- -- 68 16 91 %   21 1400 129/76 -- -- 72 18 100 %       CVP:        Intake/Output Summary (Last 24 hours) at 2021 1716  Last data filed at 2021 1600  Gross per 24 hour   Intake 650 ml   Output 1200 ml   Net -550 ml       EXAM:  Patient seen in droplet plus precautions  General: Sitting up in the chair, no distress, awake alert and well-oriented  Eyes: PERRL. No sclera icterus. No conjunctiva injected. ENT: No discharge. Pharynx clear. Neck: Trachea midline. Normal thyroid. Resp: no  accessory muscle use. Diminished breath sounds no wheezing. No rhonchi. No crackles . CV: Regular rate. Regular rhythm. ESM aortic area no rub. No JVD. Palpable pedal pulses. GI: Non-tender. Non-distended. No masses. No organmegaly. Normal bowel sounds. No hernia. Skin: Warm and dry. No nodule on exposed extremities.  No rash on exposed extremities. Lymph: No cervical LAD. No supraclavicular LAD. M/S: No cyanosis. No joint deformity. No clubbing. Neuro: Awake. Follows commands. Positive pupils/gag/corneals. Normal pain response. Psych: Oriented to person, place, time. No anxiety or agitation. Medications:  Scheduled Meds:   aspirin  81 mg Oral Daily    mupirocin   Nasal BID    enoxaparin  30 mg Subcutaneous BID    dexamethasone  6 mg Intravenous Q24H    Vitamin D  2,000 Units Oral Daily    citalopram  10 mg Oral Daily    therapeutic multivitamin-minerals  1 tablet Oral Daily    sodium chloride flush  5-40 mL Intravenous 2 times per day       PRN Meds:  oxyCODONE-acetaminophen, sodium chloride flush, sodium chloride, ondansetron **OR** ondansetron, polyethylene glycol, acetaminophen **OR** acetaminophen, guaiFENesin-dextromethorphan, sodium chloride    Results:  CBC:   Recent Labs     07/06/21  0739 07/07/21 0433 07/08/21 0422   WBC 8.5 8.5 9.8   HGB 10.2* 10.8* 11.4*   HCT 30.3* 31.1* 32.8*   MCV 89.9 88.5 89.2    460* 444     BMP:   Recent Labs     07/06/21  0740 07/07/21 0432 07/08/21  0422    137 137   K 3.7 3.6 4.3    99 98*   CO2 26 27 28   BUN 17 20 19   CREATININE <0.5* <0.5* <0.5*     LIVER PROFILE:   Recent Labs     07/06/21  0739 07/07/21 0432 07/08/21  0422   AST 28 34 48*   ALT 32 34 45*   BILIDIR <0.2 <0.2 <0.2   BILITOT 0.4 0.4 0.4   ALKPHOS 117 116 113       Cultures:  COVID 19 detected    Films:    VL Extremity Venous Bilateral         XR CHEST PORTABLE   Final Result   Stable chest.         XR CHEST (2 VW)   Final Result   Bilateral evolving atypical viral pneumonitis.                 Assessment:    Principal Problem:    Acute hypoxemic respiratory failure due to COVID-19 [facility])  Active Problems:    Heart valve replaced    Hypokalemia    PAF (paroxysmal atrial fibrillation) (HCC)    Pneumonia due to COVID-19 virus    Leg edema, left    Acute respiratory failure with hypoxia ([address] Electrolyte disorder    Transaminitis  Resolved Problems:    * No resolved hospital problems. *         Plan:    Acute respiratory failure due to Covid 19 infection  -oxygen saturation was in the 80s at home. 87% of the time of admission in the ER. COVID 19 infection with viral pneumonia   - She has not been vaccinated.    - sent to ICU for worsening respiratory failure. - has symptoms of cough, SOB, low grade fevers, poor appetite, diarrhea.  - CXR with evolving viral pneumonitis  - pulmonology consulted. -Received Tocilizumab  - Started on Remdesevir - > continue day#5/5. Monitor LFT and renal function.  -Continue Decadron day #6  -Lasix as needed  -Lovenox twice daily  -Patient has been on high flow oxygen per Vapotherm for several days, hypoxemia slowly improving daily   FiO2 is down from 75% to 65% to 55 %,  O2 40 L--> 30 L. Currently weaned down to high flow oxygen per nasal cannula 14 L-> 12L     Hypokalemia  - replaced     LLE swelling  - checked venous doppler-negative for DVT .     Paroxysmal atrial fibrillation  Cardiac pacemaker in place  Status post mitral valve replacement, bioprosthetic  Mild Aortic stenosis  - currently paced rhythm  - she takes no cardiac meds, no AC  - f/w Dr Lamin Ferris at [facility]  - no acute issues noted      COPD  - no AE     Depression  - continue celexa      DVT Prophylaxis: Lovenox  Diet: ADULT DIET;  Regular  Code Status: Full Code         Claudene Rose, MD 5:16 PM 7/8/2021

## 2021-07-08 NOTE — PROGRESS NOTES
Pulmonary & Critical Care Medicine ICU Progress Note    CC: COVID-19    Events of Last 24 hours:   Vapotherm 70% FiO2  Dyspnea on minimal exertion        Vascular lines: IV: PIVs            / / /FiO2 : 10 %  No results for input(s): PHART, PBV6XXF, PO2ART in the last 72 hours. IV:   sodium chloride 100 mL (21 1404)       Vitals:  Blood pressure 118/71, pulse 65, temperature 97.8 °F (36.6 °C), temperature source Oral, resp. rate 19, height 5' 6\" (1.676 m), weight 151 lb 1.6 oz (68.5 kg), SpO2 93 %, not currently breastfeeding. on Vapotherm 70% FiO2  Temp  Av.6 °F (36.4 °C)  Min: 97.3 °F (36.3 °C)  Max: 97.8 °F (36.6 °C)    Intake/Output Summary (Last 24 hours) at 2021 0747  Last data filed at 2021 0600  Gross per 24 hour   Intake 1130 ml   Output 1200 ml   Net -70 ml     PE:  General: ill appearing    Eyes: PERRL. No sclera icterus. No conjunctival injection. ENT: No discharge. Pharynx clear. Neck: Trachea midline. Normal thyroid. Resp: + Accessory muscle use. + crackles. No wheezing. No rhonchi. No dullness on percussion. CV: Regular rate. Regular rhythm. No mumur or rub. No edema. GI: Non-tender. Non-distended. No masses. No organomegaly. Normal bowel sounds. No hernia. Skin: Warm and dry. No nodule on exposed extremities. No rash on exposed extremities. Lymph: No cervical LAD. No supraclavicular LAD. M/S: No cyanosis. No joint deformity. No clubbing. Neuro: AAOx3.  Patellar reflexes are symmetric  Psych: No agitation, no anxiety, affect is full     Scheduled Meds:   mupirocin   Nasal BID    enoxaparin  30 mg Subcutaneous BID    dexamethasone  6 mg Intravenous Q24H    Vitamin D  2,000 Units Oral Daily    citalopram  10 mg Oral Daily    therapeutic multivitamin-minerals  1 tablet Oral Daily    sodium chloride flush  5-40 mL Intravenous 2 times per day    remdesivir IVPB  100 mg Intravenous Q24H       Data:  CBC:   Recent Labs     21  0739 21  0438 07/08/21  0422   WBC 8.5 8.5 9.8   HGB 10.2* 10.8* 11.4*   HCT 30.3* 31.1* 32.8*   MCV 89.9 88.5 89.2    460* 444     BMP:   Recent Labs     07/06/21  0740 07/07/21  0432 07/08/21 0422    137 137   K 3.7 3.6 4.3    99 98*   CO2 26 27 28   BUN 17 20 19   CREATININE <0.5* <0.5* <0.5*     LIVER PROFILE:   Recent Labs     07/06/21  0739 07/07/21  0432 07/08/21 0422   AST 28 34 48*   ALT 32 34 45*   BILIDIR <0.2 <0.2 <0.2   BILITOT 0.4 0.4 0.4   ALKPHOS 117 116 113       Microbiology:  6/30 rapid Covid detected      Imaging:  Chest x-ray 7/5 imaging reviewed by me and showed  Bilateral ASD - worse LL     LE doppler 7/6  Right   There is no evidence of deep or superficial venous thrombosis involving the   right lower extremity. There is abnormal pulsatile flow seen in the lower extremity. Left   There is no evidence of deep or superficial venous thrombosis involving the   left lower extremity. There is abnormal pulsatile flow seen in the lower extremity. There are no previous studies for comparison    ASSESSMENT:  · Acute hypoxic respiratory failure  · COVID 19 pneumonia  · Electrolytes disorder   · Transminitis   · S/p AVR, PAF, ASD s/p pacemaker followed by cardiology      PLAN:  · HFNC/Vapotherm for life-threatening acute hypoxemic respiratory failure and titrate to maintain SaO2 >92%  · Droplet + precautions  · Decadron 6 mg IV daily D#6  · Remdesevir D#5/5, close monitoring of renal and LFTs  · Received tocilizumab x1  · Repeat Lasix 40 mg IV x 1   · Electrolytes replacement   · Start ASA   · DVT prophylaxis: Lovenox BID   · MRSA prophylaxis: Bactroban              Total critical care time caring for this patient with life threatening, unstable organ failure, including direct patient contact, management of life support systems, review of data including imaging and labs, discussions with other team members and physicians is 31 minutes, excluding procedures.

## 2021-07-09 LAB
ALBUMIN SERPL-MCNC: 3.2 G/DL (ref 3.4–5)
ALP BLD-CCNC: 107 U/L (ref 40–129)
ALT SERPL-CCNC: 61 U/L (ref 10–40)
ANION GAP SERPL CALCULATED.3IONS-SCNC: 11 MMOL/L (ref 3–16)
AST SERPL-CCNC: 56 U/L (ref 15–37)
BANDED NEUTROPHILS RELATIVE PERCENT: 1 % (ref 0–7)
BASOPHILS ABSOLUTE: 0 K/UL (ref 0–0.2)
BASOPHILS RELATIVE PERCENT: 0 %
BILIRUB SERPL-MCNC: 0.5 MG/DL (ref 0–1)
BILIRUBIN DIRECT: <0.2 MG/DL (ref 0–0.3)
BILIRUBIN, INDIRECT: ABNORMAL MG/DL (ref 0–1)
BUN BLDV-MCNC: 23 MG/DL (ref 7–20)
CALCIUM SERPL-MCNC: 8.5 MG/DL (ref 8.3–10.6)
CHLORIDE BLD-SCNC: 96 MMOL/L (ref 99–110)
CO2: 28 MMOL/L (ref 21–32)
CREAT SERPL-MCNC: 0.6 MG/DL (ref 0.6–1.1)
EOSINOPHILS ABSOLUTE: 0 K/UL (ref 0–0.6)
EOSINOPHILS RELATIVE PERCENT: 0 %
GFR AFRICAN AMERICAN: >60
GFR NON-AFRICAN AMERICAN: >60
GLUCOSE BLD-MCNC: 143 MG/DL (ref 70–99)
HCT VFR BLD CALC: 36.5 % (ref 36–48)
HEMOGLOBIN: 12.2 G/DL (ref 12–16)
LYMPHOCYTES ABSOLUTE: 0.5 K/UL (ref 1–5.1)
LYMPHOCYTES RELATIVE PERCENT: 4 %
MCH RBC QN AUTO: 29.9 PG (ref 26–34)
MCHC RBC AUTO-ENTMCNC: 33.3 G/DL (ref 31–36)
MCV RBC AUTO: 89.7 FL (ref 80–100)
MONOCYTES ABSOLUTE: 0 K/UL (ref 0–1.3)
MONOCYTES RELATIVE PERCENT: 0 %
NEUTROPHILS ABSOLUTE: 11.1 K/UL (ref 1.7–7.7)
NEUTROPHILS RELATIVE PERCENT: 95 %
PDW BLD-RTO: 14.8 % (ref 12.4–15.4)
PLATELET # BLD: 468 K/UL (ref 135–450)
PLATELET SLIDE REVIEW: ABNORMAL
PMV BLD AUTO: 7 FL (ref 5–10.5)
POTASSIUM SERPL-SCNC: 4.2 MMOL/L (ref 3.5–5.1)
RBC # BLD: 4.07 M/UL (ref 4–5.2)
SLIDE REVIEW: ABNORMAL
SODIUM BLD-SCNC: 135 MMOL/L (ref 136–145)
TOTAL PROTEIN: 5.3 G/DL (ref 6.4–8.2)
WBC # BLD: 11.6 K/UL (ref 4–11)

## 2021-07-09 PROCEDURE — 80048 BASIC METABOLIC PNL TOTAL CA: CPT

## 2021-07-09 PROCEDURE — 6360000002 HC RX W HCPCS: Performed by: INTERNAL MEDICINE

## 2021-07-09 PROCEDURE — 85025 COMPLETE CBC W/AUTO DIFF WBC: CPT

## 2021-07-09 PROCEDURE — 6370000000 HC RX 637 (ALT 250 FOR IP): Performed by: HOSPITALIST

## 2021-07-09 PROCEDURE — 6370000000 HC RX 637 (ALT 250 FOR IP): Performed by: INTERNAL MEDICINE

## 2021-07-09 PROCEDURE — 94761 N-INVAS EAR/PLS OXIMETRY MLT: CPT

## 2021-07-09 PROCEDURE — 99233 SBSQ HOSP IP/OBS HIGH 50: CPT | Performed by: INTERNAL MEDICINE

## 2021-07-09 PROCEDURE — 1200000000 HC SEMI PRIVATE

## 2021-07-09 PROCEDURE — 36415 COLL VENOUS BLD VENIPUNCTURE: CPT

## 2021-07-09 PROCEDURE — 99232 SBSQ HOSP IP/OBS MODERATE 35: CPT | Performed by: INTERNAL MEDICINE

## 2021-07-09 PROCEDURE — 2580000003 HC RX 258: Performed by: HOSPITALIST

## 2021-07-09 PROCEDURE — 6360000002 HC RX W HCPCS: Performed by: HOSPITALIST

## 2021-07-09 PROCEDURE — 80076 HEPATIC FUNCTION PANEL: CPT

## 2021-07-09 PROCEDURE — 2700000000 HC OXYGEN THERAPY PER DAY

## 2021-07-09 RX ORDER — SENNA AND DOCUSATE SODIUM 50; 8.6 MG/1; MG/1
2 TABLET, FILM COATED ORAL 2 TIMES DAILY
Status: DISCONTINUED | OUTPATIENT
Start: 2021-07-09 | End: 2021-07-12 | Stop reason: HOSPADM

## 2021-07-09 RX ADMIN — STANDARDIZED SENNA CONCENTRATE AND DOCUSATE SODIUM 2 TABLET: 8.6; 5 TABLET ORAL at 11:28

## 2021-07-09 RX ADMIN — CITALOPRAM HYDROBROMIDE 10 MG: 20 TABLET ORAL at 08:33

## 2021-07-09 RX ADMIN — SODIUM CHLORIDE, PRESERVATIVE FREE 10 ML: 5 INJECTION INTRAVENOUS at 21:41

## 2021-07-09 RX ADMIN — MUPIROCIN: 20 OINTMENT TOPICAL at 08:33

## 2021-07-09 RX ADMIN — ENOXAPARIN SODIUM 30 MG: 30 INJECTION SUBCUTANEOUS at 21:30

## 2021-07-09 RX ADMIN — STANDARDIZED SENNA CONCENTRATE AND DOCUSATE SODIUM 2 TABLET: 8.6; 5 TABLET ORAL at 21:32

## 2021-07-09 RX ADMIN — ASPIRIN 81 MG: 81 TABLET, CHEWABLE ORAL at 08:32

## 2021-07-09 RX ADMIN — MUPIROCIN: 20 OINTMENT TOPICAL at 21:32

## 2021-07-09 RX ADMIN — ENOXAPARIN SODIUM 30 MG: 30 INJECTION SUBCUTANEOUS at 08:32

## 2021-07-09 RX ADMIN — Medication 1 TABLET: at 08:33

## 2021-07-09 RX ADMIN — SODIUM CHLORIDE, PRESERVATIVE FREE 10 ML: 5 INJECTION INTRAVENOUS at 08:33

## 2021-07-09 RX ADMIN — Medication 2000 UNITS: at 08:32

## 2021-07-09 RX ADMIN — DEXAMETHASONE SODIUM PHOSPHATE 6 MG: 10 INJECTION, SOLUTION INTRAMUSCULAR; INTRAVENOUS at 23:44

## 2021-07-09 ASSESSMENT — PAIN SCALES - GENERAL
PAINLEVEL_OUTOF10: 0
PAINLEVEL_OUTOF10: 0
PAINLEVEL_OUTOF10: 2
PAINLEVEL_OUTOF10: 0

## 2021-07-09 NOTE — CARE COORDINATION
INTERDISCIPLINARY PLAN OF CARE CONFERENCE    Date/Time: 7/9/2021 3:22 PM  Completed by: Saloni Melton RN, Case Management      Patient Name:  Karlee White  YOB: 1965  Admitting Diagnosis: Pneumonia due to COVID-19 virus [U07.1, J12.82]     Admit Date/Time:  7/3/2021 11:04 PM    Chart reviewed. Interdisciplinary team contacted or reviewed plan related to patient progress and discharge plans. Disciplines included Case Management, Nursing, and Dietitian. Current Status:inpt  PT/OT recommendation for discharge plan of care: tbd    Expected D/C Disposition:  tbd    Discharge Plan Comments: Reviewed chart. Plan to move out of ICU today, pt in covid isolation. Pt remains on 10 liters HFNC. Awaiting PT evaluation.  CM following     Home O2 in place on admit: No

## 2021-07-09 NOTE — PROGRESS NOTES
Nutrition Assessment     Type and Reason for Visit: Initial (LOS x 5 days)    Nutrition Recommendations/Plan:   1. Continue ADULT DIET; Regular diet order. 2. Monitor appetite and po intake. 3. Please obtain an updated weight for this patient - last weight was obtained on 7/4/21.   4. Monitor nutrition-related labs, bowel function (+ constipation - on bowel regimen), and weight trends. Nutrition Assessment:  patient was nutritionally compromised upon admission AEB decreased appetite/po intake r/t symptoms from COVID-19 virus, however, she has improved from a nutritional standpoint AEB improved appetite/po intake and improved respiratory status and she is at low risk for nutritional compromise at this time; will continue ADULT DIET; Regular diet order and monitor nutrition status    Malnutrition Assessment:  Malnutrition Status: At risk for malnutrition     Estimated Daily Nutrient Needs:  Energy (kcal): 1725 - 1932 kcals based on 25-28 kcals/kg/CBW; Weight Used for Energy Requirements:  Current     Protein (g): 69 - 83 g protein based on 1.0-1.2 g/kg/CBW; Weight Used for Protein Requirements:  Current        Fluid (ml/day): 1725 - 1932 ml; Weight Used for Fluid Requirements:  1 ml/kcal      Nutrition Related Findings: patient is A & O x 4; abdomen is soft, non-tender, and bowel sounds are active; last BM was PTA; + constipation; Na and Cl are low; BUN is elevated; patient has decadron, vitamin D, Senokot-S, and MVI ordered at this time      Current Nutrition Therapies:    ADULT DIET;  Regular    Anthropometric Measures:  · Height: 5' 6\" (167.6 cm)  · Current Body Wt: 151 lb 1.6 oz (68.5 kg) (obtained on 7/4/21)   · BMI: 24.4    Nutrition Diagnosis:   · Increased nutrient needs related to increase demand for energy/nutrients, impaired respiratory function, inadequate protein-energy intake as evidenced by poor intake prior to admission, lab values, other (comment) (COVID-19 virus)      Nutrition Interventions:   Food and/or Nutrient Delivery:  Continue Current Diet  Nutrition Education/Counseling:  No recommendation at this time   Coordination of Nutrition Care:  No recommendation at this time    Goals:  patient will consume 75% or greater of meals on ADULT DIET;  Regular diet order x 3 meals per day       Nutrition Monitoring and Evaluation:   Behavioral-Environmental Outcomes:  None Identified   Food/Nutrient Intake Outcomes:  Food and Nutrient Intake  Physical Signs/Symptoms Outcomes:  Biochemical Data, Constipation, Meal Time Behavior, Skin, Weight     Discharge Planning:    Continue current diet     Electronically signed by Julissa Johnson RD, LD on 7/9/21 at 4:49 PM EDT    Contact: [phone]

## 2021-07-09 NOTE — PROGRESS NOTES
31.1* 32.8* 36.5   MCV 88.5 89.2 89.7   * 444 468*     BMP:   Recent Labs     07/07/21  0432 07/08/21  0422 07/09/21  0524    137 135*   K 3.6 4.3 4.2   CL 99 98* 96*   CO2 27 28 28   BUN 20 19 23*   CREATININE <0.5* <0.5* 0.6     LIVER PROFILE:   Recent Labs     07/07/21  0432 07/08/21  0422 07/09/21  0524   AST 34 48* 56*   ALT 34 45* 61*   BILIDIR <0.2 <0.2 <0.2   BILITOT 0.4 0.4 0.5   ALKPHOS 116 113 107       Microbiology:  6/30 rapid Covid detected      Imaging:  Chest x-ray 7/5 imaging reviewed by me and showed  Bilateral ASD - worse LL     LE doppler 7/6  Right   There is no evidence of deep or superficial venous thrombosis involving the   right lower extremity. There is abnormal pulsatile flow seen in the lower extremity. Left   There is no evidence of deep or superficial venous thrombosis involving the   left lower extremity. There is abnormal pulsatile flow seen in the lower extremity.    There are no previous studies for comparison    ASSESSMENT:  · Acute hypoxic respiratory failure  · COVID 19 pneumonia  · Electrolytes disorder   · Transminitis   · S/p AVR, PAF, ASD s/p pacemaker followed by cardiology      PLAN:  · HFNC/Vapotherm for life-threatening acute hypoxemic respiratory failure and titrate to maintain SaO2 >92%  · Droplet + precautions  · Decadron 6 mg IV daily D#7  · Completed Remdesevir D#5/5  · Received tocilizumab x1  · ASA   · DVT prophylaxis: Lovenox BID   · MRSA prophylaxis: Bactroban  · Okay to move out of the ICU from our perspective

## 2021-07-09 NOTE — PROGRESS NOTES
Patient arrived to unit via w/c at this time from ICU patient assisted into bed and 02 placed at 8L high flow 02 sat 96% tolerated room move well

## 2021-07-10 LAB
ALBUMIN SERPL-MCNC: 3.3 G/DL (ref 3.4–5)
ALP BLD-CCNC: 109 U/L (ref 40–129)
ALT SERPL-CCNC: 64 U/L (ref 10–40)
ANION GAP SERPL CALCULATED.3IONS-SCNC: 9 MMOL/L (ref 3–16)
ANISOCYTOSIS: ABNORMAL
AST SERPL-CCNC: 47 U/L (ref 15–37)
ATYPICAL LYMPHOCYTE RELATIVE PERCENT: 3 % (ref 0–6)
BASOPHILS ABSOLUTE: 0 K/UL (ref 0–0.2)
BASOPHILS RELATIVE PERCENT: 0 %
BILIRUB SERPL-MCNC: 0.5 MG/DL (ref 0–1)
BILIRUBIN DIRECT: <0.2 MG/DL (ref 0–0.3)
BILIRUBIN, INDIRECT: ABNORMAL MG/DL (ref 0–1)
BUN BLDV-MCNC: 25 MG/DL (ref 7–20)
CALCIUM SERPL-MCNC: 9 MG/DL (ref 8.3–10.6)
CHLORIDE BLD-SCNC: 99 MMOL/L (ref 99–110)
CO2: 29 MMOL/L (ref 21–32)
CREAT SERPL-MCNC: 0.6 MG/DL (ref 0.6–1.1)
EOSINOPHILS ABSOLUTE: 0 K/UL (ref 0–0.6)
EOSINOPHILS RELATIVE PERCENT: 0 %
GFR AFRICAN AMERICAN: >60
GFR NON-AFRICAN AMERICAN: >60
GLUCOSE BLD-MCNC: 145 MG/DL (ref 70–99)
HCT VFR BLD CALC: 35.4 % (ref 36–48)
HEMOGLOBIN: 11.7 G/DL (ref 12–16)
LYMPHOCYTES ABSOLUTE: 0.8 K/UL (ref 1–5.1)
LYMPHOCYTES RELATIVE PERCENT: 4 %
MCH RBC QN AUTO: 29.8 PG (ref 26–34)
MCHC RBC AUTO-ENTMCNC: 33 G/DL (ref 31–36)
MCV RBC AUTO: 90.3 FL (ref 80–100)
MONOCYTES ABSOLUTE: 0.2 K/UL (ref 0–1.3)
MONOCYTES RELATIVE PERCENT: 2 %
MYELOCYTE PERCENT: 1 %
NEUTROPHILS ABSOLUTE: 10.6 K/UL (ref 1.7–7.7)
NEUTROPHILS RELATIVE PERCENT: 90 %
PDW BLD-RTO: 15.1 % (ref 12.4–15.4)
PLATELET # BLD: 421 K/UL (ref 135–450)
PLATELET SLIDE REVIEW: ABNORMAL
PMV BLD AUTO: 7.1 FL (ref 5–10.5)
POIKILOCYTES: ABNORMAL
POTASSIUM SERPL-SCNC: 4.4 MMOL/L (ref 3.5–5.1)
RBC # BLD: 3.92 M/UL (ref 4–5.2)
SLIDE REVIEW: ABNORMAL
SODIUM BLD-SCNC: 137 MMOL/L (ref 136–145)
TOTAL PROTEIN: 5.5 G/DL (ref 6.4–8.2)
WBC # BLD: 11.6 K/UL (ref 4–11)

## 2021-07-10 PROCEDURE — 99233 SBSQ HOSP IP/OBS HIGH 50: CPT | Performed by: INTERNAL MEDICINE

## 2021-07-10 PROCEDURE — 2580000003 HC RX 258: Performed by: HOSPITALIST

## 2021-07-10 PROCEDURE — 6360000002 HC RX W HCPCS: Performed by: HOSPITALIST

## 2021-07-10 PROCEDURE — 2700000000 HC OXYGEN THERAPY PER DAY

## 2021-07-10 PROCEDURE — 36415 COLL VENOUS BLD VENIPUNCTURE: CPT

## 2021-07-10 PROCEDURE — 6370000000 HC RX 637 (ALT 250 FOR IP): Performed by: HOSPITALIST

## 2021-07-10 PROCEDURE — 80048 BASIC METABOLIC PNL TOTAL CA: CPT

## 2021-07-10 PROCEDURE — 1200000000 HC SEMI PRIVATE

## 2021-07-10 PROCEDURE — 6370000000 HC RX 637 (ALT 250 FOR IP): Performed by: INTERNAL MEDICINE

## 2021-07-10 PROCEDURE — 85025 COMPLETE CBC W/AUTO DIFF WBC: CPT

## 2021-07-10 PROCEDURE — 80076 HEPATIC FUNCTION PANEL: CPT

## 2021-07-10 PROCEDURE — 94761 N-INVAS EAR/PLS OXIMETRY MLT: CPT

## 2021-07-10 PROCEDURE — 6360000002 HC RX W HCPCS: Performed by: INTERNAL MEDICINE

## 2021-07-10 RX ORDER — OXYCODONE AND ACETAMINOPHEN 7.5; 325 MG/1; MG/1
1 TABLET ORAL EVERY 6 HOURS PRN
Status: DISCONTINUED | OUTPATIENT
Start: 2021-07-10 | End: 2021-07-12 | Stop reason: HOSPADM

## 2021-07-10 RX ADMIN — Medication 2000 UNITS: at 09:16

## 2021-07-10 RX ADMIN — MUPIROCIN: 20 OINTMENT TOPICAL at 20:50

## 2021-07-10 RX ADMIN — CITALOPRAM HYDROBROMIDE 10 MG: 20 TABLET ORAL at 09:15

## 2021-07-10 RX ADMIN — DEXAMETHASONE SODIUM PHOSPHATE 6 MG: 10 INJECTION, SOLUTION INTRAMUSCULAR; INTRAVENOUS at 23:43

## 2021-07-10 RX ADMIN — Medication 1 TABLET: at 09:16

## 2021-07-10 RX ADMIN — SODIUM CHLORIDE, PRESERVATIVE FREE 10 ML: 5 INJECTION INTRAVENOUS at 09:18

## 2021-07-10 RX ADMIN — SODIUM CHLORIDE, PRESERVATIVE FREE 10 ML: 5 INJECTION INTRAVENOUS at 20:52

## 2021-07-10 RX ADMIN — ENOXAPARIN SODIUM 30 MG: 30 INJECTION SUBCUTANEOUS at 20:51

## 2021-07-10 RX ADMIN — MUPIROCIN: 20 OINTMENT TOPICAL at 09:22

## 2021-07-10 RX ADMIN — STANDARDIZED SENNA CONCENTRATE AND DOCUSATE SODIUM 2 TABLET: 8.6; 5 TABLET ORAL at 09:16

## 2021-07-10 RX ADMIN — ASPIRIN 81 MG: 81 TABLET, CHEWABLE ORAL at 09:17

## 2021-07-10 RX ADMIN — ENOXAPARIN SODIUM 30 MG: 30 INJECTION SUBCUTANEOUS at 09:17

## 2021-07-10 NOTE — PLAN OF CARE
Problem: Airway Clearance - Ineffective  Goal: Achieve or maintain patent airway  Outcome: Ongoing     Problem: Gas Exchange - Impaired  Goal: Absence of hypoxia  Outcome: Ongoing     Problem: Breathing Pattern - Ineffective  Goal: Ability to achieve and maintain a regular respiratory rate  Outcome: Ongoing     Problem:  Body Temperature -  Risk of, Imbalanced  Goal: Ability to maintain a body temperature within defined limits  Outcome: Ongoing     Problem: Isolation Precautions - Risk of Spread of Infection  Goal: Prevent transmission of infection  Outcome: Ongoing     Problem: Risk for Fluid Volume Deficit  Goal: Maintain normal heart rhythm  Outcome: Ongoing     Problem: Loneliness or Risk for Loneliness  Goal: Demonstrate positive use of time alone when socialization is not possible  Outcome: Ongoing     Problem: Patient Education: Go to Patient Education Activity  Goal: Patient/Family Education  Outcome: Ongoing     Problem: Falls - Risk of:  Goal: Will remain free from falls  Description: Will remain free from falls  Outcome: Ongoing

## 2021-07-10 NOTE — PROGRESS NOTES
Hospitalist Progress Note      PCP: Abby Jaeger MD    Date of Admission: 7/3/2021    Subjective: feels better today, O2 requirement decreased from 8-->7L    Medications:  Reviewed    Infusion Medications    sodium chloride 100 mL (07/04/21 1404)     Scheduled Medications    sennosides-docusate sodium  2 tablet Oral BID    aspirin  81 mg Oral Daily    mupirocin   Nasal BID    enoxaparin  30 mg Subcutaneous BID    dexamethasone  6 mg Intravenous Q24H    Vitamin D  2,000 Units Oral Daily    citalopram  10 mg Oral Daily    therapeutic multivitamin-minerals  1 tablet Oral Daily    sodium chloride flush  5-40 mL Intravenous 2 times per day     PRN Meds: oxyCODONE-acetaminophen, sodium chloride flush, sodium chloride, ondansetron **OR** ondansetron, polyethylene glycol, acetaminophen **OR** acetaminophen, guaiFENesin-dextromethorphan, sodium chloride      Intake/Output Summary (Last 24 hours) at 7/10/2021 1809  Last data filed at 7/10/2021 1727  Gross per 24 hour   Intake 1080 ml   Output --   Net 1080 ml       Physical Exam Performed:    /68   Pulse 74   Temp 97.5 °F (36.4 °C) (Oral)   Resp 18   Ht 5' 6\" (1.676 m)   Wt 151 lb 1.6 oz (68.5 kg)   SpO2 96%   Breastfeeding No   BMI 24.39 kg/m²     Patient seen in droplet plus precautions  General: Sitting up in the chair, no distress, awake alert and well-oriented  Eyes: PERRL. No sclera icterus. No conjunctiva injected. ENT: No discharge. Pharynx clear. Neck: Trachea midline. Normal thyroid. Resp: no  accessory muscle use. Diminished breath sounds no wheezing. No rhonchi. No crackles . CV: Regular rate. Regular rhythm. ESM aortic area no rub. No JVD. Palpable pedal pulses. GI: Non-tender. Non-distended. No masses. No organmegaly. Normal bowel sounds. No hernia. Skin: Warm and dry. No nodule on exposed extremities. No rash on exposed extremities. Lymph: No cervical LAD. No supraclavicular LAD. M/S: No cyanosis.  No joint deformity. No clubbing. Neuro: Awake. Follows commands. Positive pupils/gag/corneals. Normal pain response. Psych: Oriented to person, place, time. No anxiety or agitation.        Labs:   Recent Labs     07/08/21  0422 07/09/21  0524 07/10/21  0623   WBC 9.8 11.6* 11.6*   HGB 11.4* 12.2 11.7*   HCT 32.8* 36.5 35.4*    468* 421     Recent Labs     07/08/21  0422 07/09/21  0524 07/10/21  0623    135* 137   K 4.3 4.2 4.4   CL 98* 96* 99   CO2 28 28 29   BUN 19 23* 25*   CREATININE <0.5* 0.6 0.6   CALCIUM 8.6 8.5 9.0     Recent Labs     07/08/21  0422 07/09/21  0524 07/10/21  0623   AST 48* 56* 47*   ALT 45* 61* 64*   BILIDIR <0.2 <0.2 <0.2   BILITOT 0.4 0.5 0.5   ALKPHOS 113 107 109     No results for input(s): INR in the last 72 hours. No results for input(s): Phu Favre in the last 72 hours. Urinalysis:      Lab Results   Component Value Date    NITRU Negative 05/13/2019    BLOODU Negative 05/13/2019    SPECGRAV 1.025 05/13/2019    GLUCOSEU Negative 05/13/2019       Radiology:  VL Extremity Venous Bilateral   Final Result      XR CHEST PORTABLE   Final Result   Stable chest.         XR CHEST (2 VW)   Final Result   Bilateral evolving atypical viral pneumonitis. Assessment/Plan:    Active Hospital Problems    Diagnosis     Transaminitis [R74.01]     Acute respiratory failure with hypoxia ([facility]) [J96.01]     Electrolyte disorder [E87.8]     Pneumonia due to COVID-19 virus [U07.1, J12.82]     Acute hypoxemic respiratory failure due to COVID-19 ([facility]) [U07.1, J96.01]     Leg edema, left [R60.0]     Hypokalemia [E87.6]     PAF (paroxysmal atrial fibrillation) ([facility]) [I48.0]     Heart valve replaced [Z95.2]          Acute respiratory failure due to Covid 19 infection  -oxygen saturation was in the 80s at home.  87% of the time of admission in the ER.   COVID 19 infection with viral pneumonia   - She has not been vaccinated.    - sent to ICU for worsening respiratory failure. - has symptoms of cough, SOB, low grade fevers, poor appetite, diarrhea.  - CXR with evolving viral pneumonitis  - pulmonology consulted.   - Received Tocilizumab  - Started on Remdesevir - > completed 5 days   Monitor LFT and renal function.  -Continue Decadron day #8  -Lasix as needed  -Lovenox twice daily  -Patient has been on high flow oxygen per Vapotherm for several days, hypoxemia slowly improving daily   FiO2 is down from 75% to 65% to 55 %,  O2 40 L--> 30 L. Currently weaned down to high flow oxygen per nasal cannula 14 L-> 12L-->8-->7L  Transfered out of ICU 7/9  Add IS     Hypokalemia  - replaced     LLE swelling  - checked venous doppler-negative for DVT .     Paroxysmal atrial fibrillation  Cardiac pacemaker in place  Status post mitral valve replacement, bioprosthetic  Mild Aortic stenosis  - currently paced rhythm  - she takes no cardiac meds, no AC  - f/w Dr Lian Harding at [facility]  - no acute issues noted      COPD  - no AE     Depression  - continue celexa        DVT Prophylaxis: Lovenox  Diet: ADULT DIET;  Regular  Code Status: Full Code    PT/OT Eval Status: ordered    Dispo - cont care, await improvement in O2 requirement for Adán Ramirez MD

## 2021-07-10 NOTE — FLOWSHEET NOTE
07/10/21 0802   Vital Signs   Temp 96.5 °F (35.8 °C)   Temp Source Oral   Pulse 67   Heart Rate Source Monitor   Resp 18   /70   BP Location Right upper arm   Patient Position Semi fowlers   Level of Consciousness Alert (0)   MEWS Score 1   Patient Currently in Pain Denies   Oxygen Therapy   SpO2 96 %   O2 Device High flow nasal cannula   O2 Flow Rate (L/min) 8 L/min   AM assessment completed, see flow sheet. Pt is alert and oriented. Vital signs are WNL. Respirations are even & easy. Pt denies any SOB or weakness at this time. No complaints voiced. Pt denies needs at this time. SR up x 2, and bed in low position. Call light is within reach.

## 2021-07-10 NOTE — PROGRESS NOTES
Pulmonary & Critical Care Medicine ICU Progress Note    CC: COVID-19    Events of Last 24 hours:   O2 via HFNC is trending down-8 L   Dry cough      Vascular lines: IV: PIVs            / / /FiO2 : 70 %  No results for input(s): PHART, FMJ3RDI, PO2ART in the last 72 hours. IV:   sodium chloride 100 mL (21 1404)       Vitals:  Blood pressure 101/69, pulse 75, temperature 96.7 °F (35.9 °C), temperature source Axillary, resp. rate 17, height 5' 6\" (1.676 m), weight 151 lb 1.6 oz (68.5 kg), SpO2 94 %, not currently breastfeeding. on 8LPM   Temp  Av.4 °F (36.3 °C)  Min: 96.7 °F (35.9 °C)  Max: 97.9 °F (36.6 °C)    Intake/Output Summary (Last 24 hours) at 7/10/2021 0729  Last data filed at 2021 1820  Gross per 24 hour   Intake 1050 ml   Output 1425 ml   Net -375 ml     PE:  General: ill appearing    Eyes: PERRL. No sclera icterus. No conjunctival injection. ENT: No discharge. Pharynx clear. Neck: Trachea midline. Normal thyroid. Resp: Mild accessory muscle use. Scattered crackles. No wheezing. No rhonchi. No dullness on percussion. CV: Regular rate. Regular rhythm. No mumur or rub. No edema. GI: Non-tender. Non-distended. No masses. No organomegaly. Normal bowel sounds. No hernia. Skin: Warm and dry. No nodule on exposed extremities. No rash on exposed extremities. Lymph: No cervical LAD. No supraclavicular LAD. M/S: No cyanosis. No joint deformity. No clubbing. Neuro: AAOx3.  Patellar reflexes are symmetric  Psych: No agitation, no anxiety, affect is full     Scheduled Meds:   sennosides-docusate sodium  2 tablet Oral BID    aspirin  81 mg Oral Daily    mupirocin   Nasal BID    enoxaparin  30 mg Subcutaneous BID    dexamethasone  6 mg Intravenous Q24H    Vitamin D  2,000 Units Oral Daily    citalopram  10 mg Oral Daily    therapeutic multivitamin-minerals  1 tablet Oral Daily    sodium chloride flush  5-40 mL Intravenous 2 times per day       Data:  CBC:   Recent Labs 07/08/21  0422 07/09/21  0524 07/10/21  0623   WBC 9.8 11.6* 11.6*   HGB 11.4* 12.2 11.7*   HCT 32.8* 36.5 35.4*   MCV 89.2 89.7 90.3    468* 421     BMP:   Recent Labs     07/08/21  0422 07/09/21  0524 07/10/21  0623    135* 137   K 4.3 4.2 4.4   CL 98* 96* 99   CO2 28 28 29   BUN 19 23* 25*   CREATININE <0.5* 0.6 0.6     LIVER PROFILE:   Recent Labs     07/08/21 0422 07/09/21  0524 07/10/21  0623   AST 48* 56* 47*   ALT 45* 61* 64*   BILIDIR <0.2 <0.2 <0.2   BILITOT 0.4 0.5 0.5   ALKPHOS 113 107 109       Microbiology:  6/30 rapid Covid detected      Imaging:  Chest x-ray 7/5 imaging reviewed by me and showed  Bilateral ASD - worse LL     LE doppler 7/6  Right   There is no evidence of deep or superficial venous thrombosis involving the   right lower extremity. There is abnormal pulsatile flow seen in the lower extremity. Left   There is no evidence of deep or superficial venous thrombosis involving the   left lower extremity. There is abnormal pulsatile flow seen in the lower extremity.    There are no previous studies for comparison    ASSESSMENT:  · Acute hypoxic respiratory failure  · COVID 19 pneumonia  · Electrolytes disorder   · Transminitis   · S/p AVR, PAF, ASD s/p pacemaker followed by cardiology      PLAN:  · HFNC/Vapotherm for life-threatening acute hypoxemic respiratory failure and titrate to maintain SaO2 >92 well hydrated, no rash %  · Droplet + precautions  · Decadron 6 mg IV daily D#8  · Completed Remdesevir D#5/5  · Received tocilizumab x1  · ASA  · Follow LFTs  · DVT prophylaxis: Lovenox BID   · MRSA prophylaxis: Bactroban

## 2021-07-10 NOTE — PROGRESS NOTES

## 2021-07-11 LAB
ALBUMIN SERPL-MCNC: 3.1 G/DL (ref 3.4–5)
ALP BLD-CCNC: 89 U/L (ref 40–129)
ALT SERPL-CCNC: 58 U/L (ref 10–40)
ANION GAP SERPL CALCULATED.3IONS-SCNC: 8 MMOL/L (ref 3–16)
AST SERPL-CCNC: 42 U/L (ref 15–37)
BANDED NEUTROPHILS RELATIVE PERCENT: 1 % (ref 0–7)
BASOPHILS ABSOLUTE: 0 K/UL (ref 0–0.2)
BASOPHILS RELATIVE PERCENT: 0 %
BILIRUB SERPL-MCNC: 0.6 MG/DL (ref 0–1)
BILIRUBIN DIRECT: <0.2 MG/DL (ref 0–0.3)
BILIRUBIN, INDIRECT: ABNORMAL MG/DL (ref 0–1)
BUN BLDV-MCNC: 21 MG/DL (ref 7–20)
CALCIUM SERPL-MCNC: 8.3 MG/DL (ref 8.3–10.6)
CHLORIDE BLD-SCNC: 97 MMOL/L (ref 99–110)
CO2: 27 MMOL/L (ref 21–32)
CREAT SERPL-MCNC: <0.5 MG/DL (ref 0.6–1.1)
EOSINOPHILS ABSOLUTE: 0 K/UL (ref 0–0.6)
EOSINOPHILS RELATIVE PERCENT: 0 %
GFR AFRICAN AMERICAN: >60
GFR NON-AFRICAN AMERICAN: >60
GLUCOSE BLD-MCNC: 122 MG/DL (ref 70–99)
HCT VFR BLD CALC: 33.7 % (ref 36–48)
HEMOGLOBIN: 11.5 G/DL (ref 12–16)
LYMPHOCYTES ABSOLUTE: 1.2 K/UL (ref 1–5.1)
LYMPHOCYTES RELATIVE PERCENT: 10 %
MCH RBC QN AUTO: 30.7 PG (ref 26–34)
MCHC RBC AUTO-ENTMCNC: 34 G/DL (ref 31–36)
MCV RBC AUTO: 90.3 FL (ref 80–100)
MONOCYTES ABSOLUTE: 0.1 K/UL (ref 0–1.3)
MONOCYTES RELATIVE PERCENT: 1 %
NEUTROPHILS ABSOLUTE: 10.4 K/UL (ref 1.7–7.7)
NEUTROPHILS RELATIVE PERCENT: 88 %
PDW BLD-RTO: 15.3 % (ref 12.4–15.4)
PLATELET # BLD: 408 K/UL (ref 135–450)
PLATELET SLIDE REVIEW: ABNORMAL
PMV BLD AUTO: 6.9 FL (ref 5–10.5)
POTASSIUM SERPL-SCNC: 4.4 MMOL/L (ref 3.5–5.1)
RBC # BLD: 3.73 M/UL (ref 4–5.2)
SLIDE REVIEW: ABNORMAL
SODIUM BLD-SCNC: 132 MMOL/L (ref 136–145)
TOTAL PROTEIN: 5 G/DL (ref 6.4–8.2)
WBC # BLD: 11.7 K/UL (ref 4–11)

## 2021-07-11 PROCEDURE — 36415 COLL VENOUS BLD VENIPUNCTURE: CPT

## 2021-07-11 PROCEDURE — 80048 BASIC METABOLIC PNL TOTAL CA: CPT

## 2021-07-11 PROCEDURE — 6360000002 HC RX W HCPCS: Performed by: INTERNAL MEDICINE

## 2021-07-11 PROCEDURE — 1200000000 HC SEMI PRIVATE

## 2021-07-11 PROCEDURE — 94761 N-INVAS EAR/PLS OXIMETRY MLT: CPT

## 2021-07-11 PROCEDURE — 99233 SBSQ HOSP IP/OBS HIGH 50: CPT | Performed by: INTERNAL MEDICINE

## 2021-07-11 PROCEDURE — 6370000000 HC RX 637 (ALT 250 FOR IP): Performed by: HOSPITALIST

## 2021-07-11 PROCEDURE — 2580000003 HC RX 258: Performed by: HOSPITALIST

## 2021-07-11 PROCEDURE — 6370000000 HC RX 637 (ALT 250 FOR IP): Performed by: INTERNAL MEDICINE

## 2021-07-11 PROCEDURE — 2700000000 HC OXYGEN THERAPY PER DAY

## 2021-07-11 PROCEDURE — 80076 HEPATIC FUNCTION PANEL: CPT

## 2021-07-11 PROCEDURE — 85025 COMPLETE CBC W/AUTO DIFF WBC: CPT

## 2021-07-11 RX ORDER — FUROSEMIDE 10 MG/ML
40 INJECTION INTRAMUSCULAR; INTRAVENOUS ONCE
Status: COMPLETED | OUTPATIENT
Start: 2021-07-11 | End: 2021-07-11

## 2021-07-11 RX ADMIN — Medication 1 TABLET: at 09:11

## 2021-07-11 RX ADMIN — ENOXAPARIN SODIUM 30 MG: 30 INJECTION SUBCUTANEOUS at 09:13

## 2021-07-11 RX ADMIN — ASPIRIN 81 MG: 81 TABLET, CHEWABLE ORAL at 09:11

## 2021-07-11 RX ADMIN — SODIUM CHLORIDE, PRESERVATIVE FREE 10 ML: 5 INJECTION INTRAVENOUS at 09:14

## 2021-07-11 RX ADMIN — STANDARDIZED SENNA CONCENTRATE AND DOCUSATE SODIUM 2 TABLET: 8.6; 5 TABLET ORAL at 09:11

## 2021-07-11 RX ADMIN — GUAIFENESIN AND DEXTROMETHORPHAN 5 ML: 100; 10 SYRUP ORAL at 22:04

## 2021-07-11 RX ADMIN — STANDARDIZED SENNA CONCENTRATE AND DOCUSATE SODIUM 2 TABLET: 8.6; 5 TABLET ORAL at 22:03

## 2021-07-11 RX ADMIN — CITALOPRAM HYDROBROMIDE 10 MG: 20 TABLET ORAL at 09:12

## 2021-07-11 RX ADMIN — Medication 2000 UNITS: at 09:10

## 2021-07-11 RX ADMIN — ENOXAPARIN SODIUM 30 MG: 30 INJECTION SUBCUTANEOUS at 22:02

## 2021-07-11 RX ADMIN — SODIUM CHLORIDE, PRESERVATIVE FREE 10 ML: 5 INJECTION INTRAVENOUS at 22:02

## 2021-07-11 RX ADMIN — FUROSEMIDE 40 MG: 10 INJECTION, SOLUTION INTRAVENOUS at 22:03

## 2021-07-11 ASSESSMENT — PAIN SCALES - GENERAL
PAINLEVEL_OUTOF10: 0
PAINLEVEL_OUTOF10: 0

## 2021-07-11 NOTE — PLAN OF CARE
Problem: Airway Clearance - Ineffective  Goal: Achieve or maintain patent airway  7/10/2021 2323 by Saritha Desai RN  Outcome: Ongoing  7/10/2021 1354 by Treasure Jackson RN  Outcome: Ongoing     Problem: Gas Exchange - Impaired  Goal: Absence of hypoxia  7/10/2021 2323 by Saritha Desai RN  Outcome: Ongoing  7/10/2021 1354 by Treasure Jackson RN  Outcome: Ongoing  Goal: Promote optimal lung function  Outcome: Ongoing     Problem: Breathing Pattern - Ineffective  Goal: Ability to achieve and maintain a regular respiratory rate  7/10/2021 2323 by Saritha Desai RN  Outcome: Ongoing  7/10/2021 1354 by Treasure Jackson RN  Outcome: Ongoing     Problem:  Body Temperature -  Risk of, Imbalanced  Goal: Ability to maintain a body temperature within defined limits  7/10/2021 2323 by Saritha Desai RN  Outcome: Ongoing  7/10/2021 1354 by Treasure Jackson RN  Outcome: Ongoing  Goal: Will regain or maintain usual level of consciousness  Outcome: Ongoing  Goal: Complications related to the disease process, condition or treatment will be avoided or minimized  Outcome: Ongoing     Problem: Isolation Precautions - Risk of Spread of Infection  Goal: Prevent transmission of infection  7/10/2021 2323 by Saritha Desai RN  Outcome: Ongoing  7/10/2021 1354 by Treasure Jackson RN  Outcome: Ongoing     Problem: Nutrition Deficits  Goal: Optimize nutritional status  Outcome: Ongoing     Problem: Risk for Fluid Volume Deficit  Goal: Maintain normal heart rhythm  7/10/2021 2323 by Saritha Desai RN  Outcome: Ongoing  7/10/2021 1354 by Treasure Jackson RN  Outcome: Ongoing  Goal: Maintain absence of muscle cramping  Outcome: Ongoing  Goal: Maintain normal serum potassium, sodium, calcium, phosphorus, and pH  Outcome: Ongoing     Problem: Loneliness or Risk for Loneliness  Goal: Demonstrate positive use of time alone when socialization is not possible  7/10/2021 2323 by Saritha Desai RN  Outcome: Ongoing  7/10/2021 1354 by Carlos A Silva LIZZY Lyn  Outcome: Ongoing     Problem: Fatigue  Goal: Verbalize increase energy and improved vitality  Outcome: Ongoing     Problem: Patient Education: Go to Patient Education Activity  Goal: Patient/Family Education  7/10/2021 2323 by Bessie Valencia RN  Outcome: Ongoing  7/10/2021 1354 by Waldo Friends, RN  Outcome: Ongoing     Problem: Falls - Risk of:  Goal: Will remain free from falls  Description: Will remain free from falls  7/10/2021 2323 by Bessie Valencia RN  Outcome: Ongoing  7/10/2021 1354 by Waldo Friends, RN  Outcome: Ongoing  Goal: Absence of physical injury  Description: Absence of physical injury  Outcome: Ongoing

## 2021-07-11 NOTE — PROGRESS NOTES
Pulmonary & Critical Care Medicine ICU Progress Note    CC: COVID-19    Events of Last 24 hours:   O2 trending down - HFNC 7 L  Dry cough      Vascular lines: IV: PIVs            / / /FiO2 : 70 %  No results for input(s): PHART, EUN5PFF, PO2ART in the last 72 hours. IV:   sodium chloride 100 mL (21 1404)       Vitals:  Blood pressure 112/69, pulse 69, temperature 97.7 °F (36.5 °C), temperature source Oral, resp. rate 16, height 5' 6\" (1.676 m), weight 151 lb 1.6 oz (68.5 kg), SpO2 97 %, not currently breastfeeding. on 7 L  Temp  Av.3 °F (36.3 °C)  Min: 96.5 °F (35.8 °C)  Max: 97.7 °F (36.5 °C)    Intake/Output Summary (Last 24 hours) at 2021 0741  Last data filed at 7/10/2021 1727  Gross per 24 hour   Intake 840 ml   Output --   Net 840 ml     PE:  General: ill appearing    Eyes: PERRL. No sclera icterus. No conjunctival injection. ENT: No discharge. Pharynx clear. Neck: Trachea midline. Normal thyroid. Resp: Mild accessory muscle use. Few crackles. No wheezing. No rhonchi. No dullness on percussion. CV: Regular rate. Regular rhythm. No mumur or rub. No edema. GI: Non-tender. Non-distended. No masses. No organomegaly. Normal bowel sounds. No hernia. Skin: Warm and dry. No nodule on exposed extremities. No rash on exposed extremities. Lymph: No cervical LAD. No supraclavicular LAD. M/S: No cyanosis. No joint deformity. No clubbing. Neuro: AAOx3.  Patellar reflexes are symmetric  Psych: No agitation, no anxiety, affect is full     Scheduled Meds:   sennosides-docusate sodium  2 tablet Oral BID    aspirin  81 mg Oral Daily    enoxaparin  30 mg Subcutaneous BID    dexamethasone  6 mg Intravenous Q24H    Vitamin D  2,000 Units Oral Daily    citalopram  10 mg Oral Daily    therapeutic multivitamin-minerals  1 tablet Oral Daily    sodium chloride flush  5-40 mL Intravenous 2 times per day       Data:  CBC:   Recent Labs     21  0524 07/10/21  0623 21  0537   WBC 11.6* 11.6* 11.7*   HGB 12.2 11.7* 11.5*   HCT 36.5 35.4* 33.7*   MCV 89.7 90.3 90.3   * 421 408     BMP:   Recent Labs     07/09/21  0524 07/10/21  0623 07/11/21  0537   * 137 132*   K 4.2 4.4 4.4   CL 96* 99 97*   CO2 28 29 27   BUN 23* 25* 21*   CREATININE 0.6 0.6 <0.5*     LIVER PROFILE:   Recent Labs     07/09/21  0524 07/10/21  0623 07/11/21  0537   AST 56* 47* 42*   ALT 61* 64* 58*   BILIDIR <0.2 <0.2 <0.2   BILITOT 0.5 0.5 0.6   ALKPHOS 107 109 80       Microbiology:  6/30 rapid Covid detected      Imaging:  Chest x-ray 7/5 imaging reviewed by me and showed  Bilateral ASD - worse LL     LE doppler 7/6  Right   There is no evidence of deep or superficial venous thrombosis involving the   right lower extremity. There is abnormal pulsatile flow seen in the lower extremity. Left   There is no evidence of deep or superficial venous thrombosis involving the   left lower extremity. There is abnormal pulsatile flow seen in the lower extremity.    There are no previous studies for comparison    ASSESSMENT:  · Acute hypoxic respiratory failure  · COVID 19 pneumonia  · Electrolytes disorder   · Transminitis   · S/p AVR, PAF, ASD s/p pacemaker followed by cardiology      PLAN:  · HFNC/Vapotherm for life-threatening acute hypoxemic respiratory failure and titrate to maintain SaO2 >92 well hydrated, no rash %  · Droplet + precautions  · Decadron 6 mg IV daily D#9  · Completed Remdesevir D#5/5  · Received tocilizumab x1  · ASA  · Follow LFTs  · DVT prophylaxis: Lovenox BID   · MRSA prophylaxis: Bactroban  · Discussed with internal medicine

## 2021-07-11 NOTE — PROGRESS NOTES
Hospitalist Progress Note      PCP: Lenka Alvarez MD    Date of Admission: 7/3/2021    Subjective: feels better, using IS, O2 sats 97% at 7L    Medications:  Reviewed    Infusion Medications    sodium chloride 100 mL (07/04/21 1404)     Scheduled Medications    sennosides-docusate sodium  2 tablet Oral BID    aspirin  81 mg Oral Daily    enoxaparin  30 mg Subcutaneous BID    dexamethasone  6 mg Intravenous Q24H    Vitamin D  2,000 Units Oral Daily    citalopram  10 mg Oral Daily    therapeutic multivitamin-minerals  1 tablet Oral Daily    sodium chloride flush  5-40 mL Intravenous 2 times per day     PRN Meds: oxyCODONE-acetaminophen, sodium chloride flush, sodium chloride, ondansetron **OR** ondansetron, polyethylene glycol, acetaminophen **OR** acetaminophen, guaiFENesin-dextromethorphan, sodium chloride      Intake/Output Summary (Last 24 hours) at 7/11/2021 1848  Last data filed at 7/11/2021 1744  Gross per 24 hour   Intake 860 ml   Output --   Net 860 ml       Physical Exam Performed:    BP 93/61   Pulse 71   Temp 99 °F (37.2 °C) (Oral)   Resp 16   Ht 5' 6\" (1.676 m)   Wt 151 lb 1.6 oz (68.5 kg)   SpO2 98%   Breastfeeding No   BMI 24.39 kg/m²     Patient seen in droplet plus precautions  General: Sitting up in the chair, no distress, awake alert and well-oriented  Eyes: PERRL. No sclera icterus. No conjunctiva injected. ENT: No discharge. Pharynx clear. Neck: Trachea midline. Normal thyroid. Resp: no  accessory muscle use. Diminished breath sounds no wheezing. No rhonchi.  No crackles . CV: Regular rate. Regular rhythm. ESM aortic area no rub.  No JVD. Palpable pedal pulses. GI: Non-tender. Non-distended. No masses. No organmegaly. Normal bowel sounds. No hernia. Skin: Warm and dry. No nodule on exposed extremities. No rash on exposed extremities. Lymph: No cervical LAD. No supraclavicular LAD. M/S: No cyanosis. No joint deformity. No clubbing. Neuro: Awake.  Follows commands. Positive pupils/gag/corneals. Normal pain response. Psych: Oriented to person, place, time. No anxiety or agitation.        Labs:   Recent Labs     07/09/21  0524 07/10/21  0623 07/11/21  0537   WBC 11.6* 11.6* 11.7*   HGB 12.2 11.7* 11.5*   HCT 36.5 35.4* 33.7*   * 421 408     Recent Labs     07/09/21  0524 07/10/21  0623 07/11/21  0537   * 137 132*   K 4.2 4.4 4.4   CL 96* 99 97*   CO2 28 29 27   BUN 23* 25* 21*   CREATININE 0.6 0.6 <0.5*   CALCIUM 8.5 9.0 8.3     Recent Labs     07/09/21  0524 07/10/21  0623 07/11/21  0537   AST 56* 47* 42*   ALT 61* 64* 58*   BILIDIR <0.2 <0.2 <0.2   BILITOT 0.5 0.5 0.6   ALKPHOS 107 109 89     No results for input(s): INR in the last 72 hours. No results for input(s): Verlena Moise in the last 72 hours. Urinalysis:      Lab Results   Component Value Date    NITRU Negative 05/13/2019    BLOODU Negative 05/13/2019    SPECGRAV 1.025 05/13/2019    GLUCOSEU Negative 05/13/2019       Radiology:  VL Extremity Venous Bilateral   Final Result      XR CHEST PORTABLE   Final Result   Stable chest.         XR CHEST (2 VW)   Final Result   Bilateral evolving atypical viral pneumonitis. Assessment/Plan:    Active Hospital Problems    Diagnosis     Transaminitis [R74.01]     Acute respiratory failure with hypoxia ([facility]) [J96.01]     Electrolyte disorder [E87.8]     Pneumonia due to COVID-19 virus [U07.1, J12.82]     Acute hypoxemic respiratory failure due to COVID-19 ([facility]) [U07.1, J96.01]     Leg edema, left [R60.0]     Hypokalemia [E87.6]     PAF (paroxysmal atrial fibrillation) ([facility]) [I48.0]     Heart valve replaced [Z95.2]          Acute respiratory failure due to Covid 19 infection  -oxygen saturation was in the 80s at home.  87% of the time of admission in the ER. COVID 19 infection with viral pneumonia   - She has not been vaccinated.    - sent to ICU for worsening respiratory failure.   - has symptoms of cough, SOB, low grade fevers, poor appetite, diarrhea.  - CXR with evolving viral pneumonitis  - pulmonology consulted.   - Received Tocilizumab  - Started on Remdesevir - > completed 5 days   Monitor LFT and renal function.  -Continue Decadron day #9  -Lasix as needed  -Lovenox twice daily  -Patient has been on high flow oxygen per Vapotherm for several days, hypoxemia slowly improving daily   FiO2 is down from 75% to 65% to 55 %,  O2 40 L--> 30 L. weaned initially to high flow oxygen per nasal cannula 14 L-> 12L-->8-->7L  Transfered out of ICU 7/9  Add IS. Wean O2 to 5L, challenge with a dose of lasix     Hypokalemia  - replaced     LLE swelling  - checked venous doppler-negative for DVT .     Paroxysmal atrial fibrillation  Cardiac pacemaker in place  Status post mitral valve replacement, bioprosthetic  Mild Aortic stenosis  - currently paced rhythm  - she takes no cardiac meds, no AC  - f/w Dr Arsenio Lucio at [facility]  - no acute issues noted      COPD  - no AE     Depression  - continue celexa           DVT Prophylaxis: Lovenox  Diet: ADULT DIET;  Regular  Code Status: Full Code    PT/OT Eval Status: ordered    Dispo - cont care, poss dc home when O2 requirement 2-3 L on home Juan Medina MD

## 2021-07-11 NOTE — PROGRESS NOTES
Full bed change, cleaned room, new gown and pants given along with shampoo cap and bath wipes. Pt independent with hygiene.

## 2021-07-11 NOTE — PLAN OF CARE
Problem: Airway Clearance - Ineffective  Goal: Achieve or maintain patent airway  Outcome: Ongoing     Problem: Gas Exchange - Impaired  Goal: Absence of hypoxia  Outcome: Ongoing  Goal: Promote optimal lung function  Outcome: Ongoing     Problem: Breathing Pattern - Ineffective  Goal: Ability to achieve and maintain a regular respiratory rate  Outcome: Ongoing     Problem:  Body Temperature -  Risk of, Imbalanced  Goal: Ability to maintain a body temperature within defined limits  Outcome: Ongoing  Goal: Will regain or maintain usual level of consciousness  Outcome: Ongoing  Goal: Complications related to the disease process, condition or treatment will be avoided or minimized  Outcome: Ongoing     Problem: Isolation Precautions - Risk of Spread of Infection  Goal: Prevent transmission of infection  Outcome: Ongoing

## 2021-07-12 VITALS
SYSTOLIC BLOOD PRESSURE: 91 MMHG | BODY MASS INDEX: 24.28 KG/M2 | OXYGEN SATURATION: 92 % | RESPIRATION RATE: 16 BRPM | HEART RATE: 78 BPM | HEIGHT: 66 IN | DIASTOLIC BLOOD PRESSURE: 58 MMHG | TEMPERATURE: 97.9 F | WEIGHT: 151.1 LBS

## 2021-07-12 LAB
ALBUMIN SERPL-MCNC: 3.4 G/DL (ref 3.4–5)
ALP BLD-CCNC: 96 U/L (ref 40–129)
ALT SERPL-CCNC: 58 U/L (ref 10–40)
ANION GAP SERPL CALCULATED.3IONS-SCNC: 11 MMOL/L (ref 3–16)
AST SERPL-CCNC: 38 U/L (ref 15–37)
BASOPHILS ABSOLUTE: 0 K/UL (ref 0–0.2)
BASOPHILS RELATIVE PERCENT: 0 %
BILIRUB SERPL-MCNC: 0.7 MG/DL (ref 0–1)
BILIRUBIN DIRECT: <0.2 MG/DL (ref 0–0.3)
BILIRUBIN, INDIRECT: ABNORMAL MG/DL (ref 0–1)
BUN BLDV-MCNC: 22 MG/DL (ref 7–20)
CALCIUM SERPL-MCNC: 8.6 MG/DL (ref 8.3–10.6)
CHLORIDE BLD-SCNC: 98 MMOL/L (ref 99–110)
CO2: 27 MMOL/L (ref 21–32)
CREAT SERPL-MCNC: 0.6 MG/DL (ref 0.6–1.1)
EOSINOPHILS ABSOLUTE: 0 K/UL (ref 0–0.6)
EOSINOPHILS RELATIVE PERCENT: 0 %
GFR AFRICAN AMERICAN: >60
GFR NON-AFRICAN AMERICAN: >60
GLUCOSE BLD-MCNC: 126 MG/DL (ref 70–99)
HCT VFR BLD CALC: 36.3 % (ref 36–48)
HEMOGLOBIN: 12.2 G/DL (ref 12–16)
LYMPHOCYTES ABSOLUTE: 0.9 K/UL (ref 1–5.1)
LYMPHOCYTES RELATIVE PERCENT: 9 %
MCH RBC QN AUTO: 30.4 PG (ref 26–34)
MCHC RBC AUTO-ENTMCNC: 33.5 G/DL (ref 31–36)
MCV RBC AUTO: 90.8 FL (ref 80–100)
MONOCYTES ABSOLUTE: 0 K/UL (ref 0–1.3)
MONOCYTES RELATIVE PERCENT: 0 %
NEUTROPHILS ABSOLUTE: 9.2 K/UL (ref 1.7–7.7)
NEUTROPHILS RELATIVE PERCENT: 91 %
PDW BLD-RTO: 15.3 % (ref 12.4–15.4)
PLATELET # BLD: 408 K/UL (ref 135–450)
PLATELET SLIDE REVIEW: ADEQUATE
PMV BLD AUTO: 7.3 FL (ref 5–10.5)
POTASSIUM SERPL-SCNC: 4.3 MMOL/L (ref 3.5–5.1)
RBC # BLD: 4 M/UL (ref 4–5.2)
SLIDE REVIEW: ABNORMAL
SODIUM BLD-SCNC: 136 MMOL/L (ref 136–145)
TOTAL PROTEIN: 5.5 G/DL (ref 6.4–8.2)
WBC # BLD: 10.1 K/UL (ref 4–11)

## 2021-07-12 PROCEDURE — 80048 BASIC METABOLIC PNL TOTAL CA: CPT

## 2021-07-12 PROCEDURE — 80076 HEPATIC FUNCTION PANEL: CPT

## 2021-07-12 PROCEDURE — 6370000000 HC RX 637 (ALT 250 FOR IP): Performed by: HOSPITALIST

## 2021-07-12 PROCEDURE — 36415 COLL VENOUS BLD VENIPUNCTURE: CPT

## 2021-07-12 PROCEDURE — 2700000000 HC OXYGEN THERAPY PER DAY

## 2021-07-12 PROCEDURE — 94761 N-INVAS EAR/PLS OXIMETRY MLT: CPT

## 2021-07-12 PROCEDURE — 2580000003 HC RX 258: Performed by: HOSPITALIST

## 2021-07-12 PROCEDURE — 6370000000 HC RX 637 (ALT 250 FOR IP): Performed by: INTERNAL MEDICINE

## 2021-07-12 PROCEDURE — 6360000002 HC RX W HCPCS: Performed by: HOSPITALIST

## 2021-07-12 PROCEDURE — 99232 SBSQ HOSP IP/OBS MODERATE 35: CPT | Performed by: INTERNAL MEDICINE

## 2021-07-12 PROCEDURE — 6360000002 HC RX W HCPCS: Performed by: INTERNAL MEDICINE

## 2021-07-12 PROCEDURE — 99239 HOSP IP/OBS DSCHRG MGMT >30: CPT | Performed by: INTERNAL MEDICINE

## 2021-07-12 PROCEDURE — 85025 COMPLETE CBC W/AUTO DIFF WBC: CPT

## 2021-07-12 RX ORDER — ASPIRIN 81 MG/1
81 TABLET, CHEWABLE ORAL DAILY
Qty: 30 TABLET | Refills: 0 | Status: ON HOLD
Start: 2021-07-13 | End: 2022-08-11

## 2021-07-12 RX ADMIN — ENOXAPARIN SODIUM 30 MG: 30 INJECTION SUBCUTANEOUS at 08:42

## 2021-07-12 RX ADMIN — Medication 2000 UNITS: at 08:42

## 2021-07-12 RX ADMIN — Medication 1 TABLET: at 08:43

## 2021-07-12 RX ADMIN — CITALOPRAM HYDROBROMIDE 10 MG: 20 TABLET ORAL at 08:43

## 2021-07-12 RX ADMIN — STANDARDIZED SENNA CONCENTRATE AND DOCUSATE SODIUM 2 TABLET: 8.6; 5 TABLET ORAL at 08:43

## 2021-07-12 RX ADMIN — DEXAMETHASONE SODIUM PHOSPHATE 6 MG: 10 INJECTION, SOLUTION INTRAMUSCULAR; INTRAVENOUS at 01:28

## 2021-07-12 RX ADMIN — SODIUM CHLORIDE, PRESERVATIVE FREE 10 ML: 5 INJECTION INTRAVENOUS at 08:45

## 2021-07-12 RX ADMIN — ASPIRIN 81 MG: 81 TABLET, CHEWABLE ORAL at 08:42

## 2021-07-12 ASSESSMENT — PAIN SCALES - GENERAL: PAINLEVEL_OUTOF10: 0

## 2021-07-12 NOTE — PROGRESS NOTES
Morning assessment completed patient sitting on edge of bed asking about going home and getting a shower patient instructed that MD would be notified of requests telemetry in place MD in to see patient MD removed 02 and patient has satted at 94-95% without 02 will see how she does before giving further orders

## 2021-07-12 NOTE — CARE COORDINATION
DISCHARGE ORDER  Date/Time 2021 3:16 PM  Completed by: Kingsley Cha RN, Case Management    Patient Name: Karlee White      : 1965  Admitting Diagnosis: Pneumonia due to COVID-19 virus [U07.1, J12.82]      Admit order Date and Status: IP 2021  (verify MD's last order for status of admission)      Noted discharge order. If applicable PT/OT recommendation at Discharge: NA  DME recommendation by PT/OT:NA  Discharge Plan: CM was following from [facility] for potential home O2. Pt did not meet criteria after walk test. No other DCP needs identified. Reviewed chart. Role of discharge planner explained and patient verbalized understanding. Discharge order is noted. Has Home O2 in place on admit:  No  Informed of need to bring portable home O2 tank on day of discharge for nursing to connect prior to leaving:   No  Verbalized agreement/Understanding:   No  Pt is being d/c'd to home today. Pt's O2 sats are 92-93% on RA at rest and with ambulation. Discharge timeout done with [address]. All discharge needs and concerns addressed.

## 2021-07-12 NOTE — PLAN OF CARE
Problem: Airway Clearance - Ineffective  Goal: Achieve or maintain patent airway  7/12/2021 1053 by Eugenio Hope RN  Outcome: Ongoing  7/11/2021 2148 by Dex Moseley RN  Outcome: Ongoing     Problem: Gas Exchange - Impaired  Goal: Absence of hypoxia  7/12/2021 1053 by Eugenio Hope RN  Outcome: Ongoing  7/11/2021 2148 by Dex Moseley RN  Outcome: Ongoing  Goal: Promote optimal lung function  7/12/2021 1053 by Eugenio Hope RN  Outcome: Ongoing  7/11/2021 2148 by Dex Moseley RN  Outcome: Ongoing     Problem: Breathing Pattern - Ineffective  Goal: Ability to achieve and maintain a regular respiratory rate  7/12/2021 1053 by Eugenio Hope RN  Outcome: Ongoing  7/11/2021 2148 by Dex Moseley RN  Outcome: Ongoing     Problem:  Body Temperature -  Risk of, Imbalanced  Goal: Ability to maintain a body temperature within defined limits  7/12/2021 1053 by Eugenio Hope RN  Outcome: Ongoing  7/11/2021 2148 by Dex Moseley RN  Outcome: Ongoing  Goal: Will regain or maintain usual level of consciousness  7/12/2021 1053 by Eugenio Hope RN  Outcome: Ongoing  7/11/2021 2148 by Dex Moseley RN  Outcome: Ongoing  Goal: Complications related to the disease process, condition or treatment will be avoided or minimized  7/12/2021 1053 by Eugenio Hope RN  Outcome: Ongoing  7/11/2021 2148 by Dex Moseley RN  Outcome: Ongoing     Problem: Isolation Precautions - Risk of Spread of Infection  Goal: Prevent transmission of infection  7/12/2021 1053 by Eugenio Hope RN  Outcome: Ongoing  7/11/2021 2148 by Dex Moseley RN  Outcome: Ongoing     Problem: Nutrition Deficits  Goal: Optimize nutritional status  7/12/2021 1053 by Eugenio Hope RN  Outcome: Ongoing  7/11/2021 2148 by Dex Moseley RN  Outcome: Ongoing     Problem: Risk for Fluid Volume Deficit  Goal: Maintain normal heart rhythm  7/12/2021 1053 by Eugenio Hope RN  Outcome: Ongoing  7/11/2021 2148 by Dex Moseley RN  Outcome: Ongoing  Goal: Maintain absence of muscle cramping  7/12/2021 1053 by Abhinav Cody RN  Outcome: Ongoing  7/11/2021 2148 by Kermit Schwab, RN  Outcome: Ongoing  Goal: Maintain normal serum potassium, sodium, calcium, phosphorus, and pH  7/12/2021 1053 by Abhinav Cody RN  Outcome: Ongoing  7/11/2021 2148 by Kermit Schwab, RN  Outcome: Ongoing     Problem: Loneliness or Risk for Loneliness  Goal: Demonstrate positive use of time alone when socialization is not possible  7/12/2021 1053 by Abhinav Cody RN  Outcome: Ongoing  7/11/2021 2148 by Kermit Schwab, RN  Outcome: Ongoing     Problem: Fatigue  Goal: Verbalize increase energy and improved vitality  7/12/2021 1053 by Abhinav Cody RN  Outcome: Ongoing  7/11/2021 2148 by Kermit Schwab, RN  Outcome: Ongoing     Problem: Patient Education: Go to Patient Education Activity  Goal: Patient/Family Education  7/12/2021 1053 by Abhinav Cody RN  Outcome: Ongoing  7/11/2021 2148 by Kermit Schwab, RN  Outcome: Ongoing     Problem: Falls - Risk of:  Goal: Will remain free from falls  Description: Will remain free from falls  7/12/2021 1053 by Abhinav Cody RN  Outcome: Ongoing  7/11/2021 2148 by Kermit Schwab, RN  Outcome: Ongoing  Goal: Absence of physical injury  Description: Absence of physical injury  7/12/2021 1053 by Abhinav Cody RN  Outcome: Ongoing  7/11/2021 2148 by Kermit Schwab, RN  Outcome: Ongoing

## 2021-07-12 NOTE — PROGRESS NOTES
Pulmonary & Critical Care Medicine ICU Progress Note    CC: COVID-19    Events of Last 24 hours:   O2 trending down - 3L NC  She feels better. Vascular lines: IV: PIVs            / / /FiO2 : 70 %  No results for input(s): PHART, ADC7PFI, PO2ART in the last 72 hours. IV:   sodium chloride 100 mL (21 1404)       Vitals:  Blood pressure 94/60, pulse 67, temperature 97.7 °F (36.5 °C), temperature source Oral, resp. rate 16, height 5' 6\" (1.676 m), weight 151 lb 1.6 oz (68.5 kg), SpO2 97 %, not currently breastfeeding. on 3 L  Temp  Av.1 °F (36.7 °C)  Min: 97 °F (36.1 °C)  Max: 99 °F (37.2 °C)    Intake/Output Summary (Last 24 hours) at 2021 1151  Last data filed at 2021 0130  Gross per 24 hour   Intake 980 ml   Output --   Net 980 ml     PE:  General: ill appearing    Eyes: PERRL. No sclera icterus. No conjunctival injection. ENT: No discharge. Pharynx clear. Neck: Trachea midline. Resp: NO accessory muscle use. Few crackles. No wheezing. No rhonchi. No dullness on percussion. CV: Regular rate. Regular rhythm. No mumur or rub. No edema. GI: Non-tender. Non-distended. No masses. Skin: Warm and dry. No nodule on exposed extremities. No rash on exposed extremities. M/S: No cyanosis. No joint deformity. No clubbing. Neuro: AAOx3. Moves all extremities, conversant.      Scheduled Meds:   sennosides-docusate sodium  2 tablet Oral BID    aspirin  81 mg Oral Daily    enoxaparin  30 mg Subcutaneous BID    dexamethasone  6 mg Intravenous Q24H    Vitamin D  2,000 Units Oral Daily    citalopram  10 mg Oral Daily    therapeutic multivitamin-minerals  1 tablet Oral Daily    sodium chloride flush  5-40 mL Intravenous 2 times per day       Data:  CBC:   Recent Labs     07/10/21  0623 21  0537 21  0547   WBC 11.6* 11.7* 10.1   HGB 11.7* 11.5* 12.2   HCT 35.4* 33.7* 36.3   MCV 90.3 90.3 90.8    408 408     BMP:   Recent Labs     07/10/21  0623 21  0537 07/12/21  0547    132* 136   K 4.4 4.4 4.3   CL 99 97* 98*   CO2 29 27 27   BUN 25* 21* 22*   CREATININE 0.6 <0.5* 0.6     LIVER PROFILE:   Recent Labs     07/10/21  0623 07/11/21  0537 07/12/21  0547   AST 47* 42* 38*   ALT 64* 58* 58*   BILIDIR <0.2 <0.2 <0.2   BILITOT 0.5 0.6 0.7   ALKPHOS 109 80 96       Microbiology:  6/30 rapid Covid detected      Imaging:  No new    LE doppler 7/6  Right   There is no evidence of deep or superficial venous thrombosis involving the   right lower extremity. There is abnormal pulsatile flow seen in the lower extremity. Left   There is no evidence of deep or superficial venous thrombosis involving the   left lower extremity. There is abnormal pulsatile flow seen in the lower extremity.    There are no previous studies for comparison    ASSESSMENT:  · Acute hypoxic respiratory failure  · COVID 19 pneumonia  · Electrolytes disorder   · Elevated transaminases  · S/p AVR, PAF, ASD s/p pacemaker followed by cardiology      PLAN:  · Supplemental oxygen to maintain SaO2 >92%; wean as tolerated   · Droplet + precautions  · Decadron 6 mg IV daily D#10  · Completed Remdesevir D#5/5  · Received tocilizumab x1  · ASA  · D/c with O2 ok from my perspective

## 2021-07-12 NOTE — PROGRESS NOTES
Hospitalist Progress Note      PCP: Sue Cole MD    Date of Admission: 7/3/2021    Subjective: feels better, using IS, now on 3 L of O2    Medications:  Reviewed    Infusion Medications    sodium chloride 100 mL (07/04/21 1404)     Scheduled Medications    sennosides-docusate sodium  2 tablet Oral BID    aspirin  81 mg Oral Daily    enoxaparin  30 mg Subcutaneous BID    dexamethasone  6 mg Intravenous Q24H    Vitamin D  2,000 Units Oral Daily    citalopram  10 mg Oral Daily    therapeutic multivitamin-minerals  1 tablet Oral Daily    sodium chloride flush  5-40 mL Intravenous 2 times per day     PRN Meds: oxyCODONE-acetaminophen, sodium chloride flush, sodium chloride, ondansetron **OR** ondansetron, polyethylene glycol, acetaminophen **OR** acetaminophen, guaiFENesin-dextromethorphan, sodium chloride      Intake/Output Summary (Last 24 hours) at 7/12/2021 0903  Last data filed at 7/12/2021 0130  Gross per 24 hour   Intake 980 ml   Output --   Net 980 ml       Physical Exam Performed:    BP 94/60   Pulse 67   Temp 97.7 °F (36.5 °C) (Oral)   Resp 16   Ht 5' 6\" (1.676 m)   Wt 151 lb 1.6 oz (68.5 kg)   SpO2 97%   Breastfeeding No   BMI 24.39 kg/m²     Patient seen in droplet plus precautions  General: Sitting up in the chair, no distress, awake alert and well-oriented  Eyes: PERRL. No sclera icterus. No conjunctiva injected. ENT: No discharge. Pharynx clear. Neck: Trachea midline. Normal thyroid. Resp: no  accessory muscle use. Diminished breath sounds no wheezing. No rhonchi.  No crackles . CV: Regular rate. Regular rhythm. ESM aortic area no rub.  No JVD. Palpable pedal pulses. GI: Non-tender. Non-distended. No masses. No organmegaly. Normal bowel sounds. No hernia. Skin: Warm and dry. No nodule on exposed extremities. No rash on exposed extremities. Lymph: No cervical LAD. No supraclavicular LAD. M/S: No cyanosis. No joint deformity. No clubbing. Neuro: Awake.  Follows commands. Positive pupils/gag/corneals. Normal pain response. Psych: Oriented to person, place, time. No anxiety or agitation.        Labs:   Recent Labs     07/10/21  0623 07/11/21  0537 07/12/21  0547   WBC 11.6* 11.7* 10.1   HGB 11.7* 11.5* 12.2   HCT 35.4* 33.7* 36.3    408 408     Recent Labs     07/10/21  0623 07/11/21  0537 07/12/21  0547    132* 136   K 4.4 4.4 4.3   CL 99 97* 98*   CO2 29 27 27   BUN 25* 21* 22*   CREATININE 0.6 <0.5* 0.6   CALCIUM 9.0 8.3 8.6     Recent Labs     07/10/21  0623 07/11/21  0537 07/12/21  0547   AST 47* 42* 38*   ALT 64* 58* 58*   BILIDIR <0.2 <0.2 <0.2   BILITOT 0.5 0.6 0.7   ALKPHOS 109 89 96     No results for input(s): INR in the last 72 hours. No results for input(s): Phu Favre in the last 72 hours. Urinalysis:      Lab Results   Component Value Date    NITRU Negative 05/13/2019    BLOODU Negative 05/13/2019    SPECGRAV 1.025 05/13/2019    GLUCOSEU Negative 05/13/2019       Radiology:  VL Extremity Venous Bilateral   Final Result      XR CHEST PORTABLE   Final Result   Stable chest.         XR CHEST (2 VW)   Final Result   Bilateral evolving atypical viral pneumonitis. Assessment/Plan:    Active Hospital Problems    Diagnosis     Transaminitis [R74.01]     Acute respiratory failure with hypoxia ([facility]) [J96.01]     Electrolyte disorder [E87.8]     Pneumonia due to COVID-19 virus [U07.1, J12.82]     Acute hypoxemic respiratory failure due to COVID-19 ([facility]) [U07.1, J96.01]     Leg edema, left [R60.0]     Hypokalemia [E87.6]     PAF (paroxysmal atrial fibrillation) ([facility]) [I48.0]     Heart valve replaced [Z95.2]          Acute respiratory failure due to Covid 19 infection  -oxygen saturation was in the 80s at home.  87% of the time of admission in the ER. COVID 19 infection with viral pneumonia   - She has not been vaccinated.    - sent to ICU for worsening respiratory failure.   - has symptoms of cough, SOB, low grade fevers, poor appetite, diarrhea.  - CXR with evolving viral pneumonitis  - pulmonology consulted.   - Received Tocilizumab  - Started on Remdesevir - > completed 5 days   Monitor LFT and renal function.  -Continue Decadron day # 10   -Lasix as needed  -Lovenox twice daily  -Patient has been on high flow oxygen per Vapotherm for several days, hypoxemia slowly improving daily   FiO2 is down from 75% to 65% to 55 %,  O2 40 L--> 30 L. weaned initially to high flow oxygen per nasal cannula 14 L-> 12L-->8-->7L-- 3 L   Transfered out of ICU 7/9  Add IS.      Hypokalemia  - replaced     LLE swelling  - checked venous doppler-negative for DVT .     Paroxysmal atrial fibrillation  Cardiac pacemaker in place  Status post mitral valve replacement, bioprosthetic  Mild Aortic stenosis  - currently paced rhythm  - she takes no cardiac meds, no AC  - f/w Dr Lupe Jacob at [facility]  - no acute issues noted      COPD  - no AE     Depression  - continue celexa         DVT Prophylaxis: Lovenox  Diet: ADULT DIET;  Regular  Code Status: Full Code    PT/OT Eval Status: ordered    Dispo - cont care    Dc if ok with Nuha Wang MD

## 2021-07-12 NOTE — FLOWSHEET NOTE
07/12/21 0348   Vital Signs   Temp 97 °F (36.1 °C)   Temp Source Oral   Pulse 96   Heart Rate Source Monitor   Resp 16   BP 93/63   BP Location Right upper arm   Patient Position Semi fowlers   Level of Consciousness Alert (0)   MEWS Score 2   Oxygen Therapy   SpO2 92 %   O2 Device None (Room air)   O2 Flow Rate (L/min) 3 L/min   Pt resting in bed, no acute distress.  Minus LIZZY Valdez

## 2021-07-12 NOTE — CARE COORDINATION
INTERDISCIPLINARY PLAN OF CARE CONFERENCE    Date/Time: 7/12/2021 12:02 PM  Completed by: Janna Peña RN, Case Management      Patient Name:  Nusrat Salas  YOB: 1965  Admitting Diagnosis: Pneumonia due to COVID-19 virus [U07.1, J12.82]     Admit Date/Time:  7/3/2021 11:04 PM    Chart reviewed. Interdisciplinary team contacted or reviewed plan related to patient progress and discharge plans. Disciplines included Case Management, Nursing, and Dietitian. Current Status: IP 04/12/2021  PT/OT recommendation for discharge plan of care: NA    Expected D/C Disposition:  Home  Confirmed plan with patient patient   Discharge Plan Comments: Plans to return home. Will needs O2 walk test prior to DC. Claire BALL made aware. Anticipated Dc later today if ok with pulm. IPTA. Denies any HHC needs. CM following.

## 2021-07-12 NOTE — PROGRESS NOTES
Patient ambulated in room without 02 sats stayed above 93% no SOB noted patient states she feels good and wants to go home

## 2021-07-12 NOTE — PLAN OF CARE
Demonstrate positive use of time alone when socialization is not possible  Outcome: Ongoing     Problem: Fatigue  Goal: Verbalize increase energy and improved vitality  Outcome: Ongoing     Problem: Patient Education: Go to Patient Education Activity  Goal: Patient/Family Education  Outcome: Ongoing     Problem: Falls - Risk of:  Goal: Will remain free from falls  Description: Will remain free from falls  Outcome: Ongoing  Goal: Absence of physical injury  Description: Absence of physical injury  Outcome: Ongoing

## 2021-07-12 NOTE — PROGRESS NOTES
Shift assessment complete. See doc flow. Nightly medications given see MAR. Covid positive, in isolation. Patient resting in bed. O2 sats 94% on 3L. PRN Robitussin given for a dry cough. Patient independent with ambulation. Call light and bedside table within easy reach. Will continue to monitor.

## 2021-07-13 ENCOUNTER — CARE COORDINATION (OUTPATIENT)
Dept: CASE MANAGEMENT | Age: 56
End: 2021-07-13

## 2021-07-13 NOTE — CARE COORDINATION
Patient contacted regarding COVID-19 diagnosis. Discussed COVID-19 related testing which was available at this time. COVID-19 Detected on 2021. Patient informed of results, if available? Yes    Call within 2 business days of discharge: Yes  Discharge Date: 21   RARS: Readmission Risk Score: 16      Care Transition Nurse contacted the patient by telephone to perform post discharge assessment. Verified name and  with patient as identifiers. Provided introduction to self, and explanation of the CTN role, and reason for call due to risk factors for infection and/or exposure to COVID-19. Symptoms reviewed with patient who verbalized the following symptoms: weakness, fatigue, dry cough. Due to no new or worsening symptoms encounter was not routed to provider for escalation. Discussed follow-up appointments. If no appointment was previously scheduled, appointment scheduling offered: completed by patient prior to outreach  [facility] follow up appointment(s): No future appointments. Non-[facility] follow up appointment(s): PCP 2021 Tacy Severance MD    Non-face-to-face services provided:  Obtained and reviewed discharge summary and/or continuity of care documents  Education of patient/family/caregiver/guardian to support self-management-COVID recovery goals, energy conservation, SaO2 goals, follow up   Assessment and support for treatment adherence and medication management-med review completed    Advance Care Planning:   Does patient have an Advance Directive:  decision maker updated. Educated patient about risk for severe COVID-19 due to risk factors according to CDC guidelines. CTN reviewed discharge instructions, medical action plan and red flag symptoms patient who verbalized understanding. Discussed COVID vaccination status Yes. Education provided on COVID-19 vaccination as appropriate. Discussed exposure protocols and quarantine with CDC Guidelines.  Patient was given an opportunity to verbalize any questions and concerns and agrees to contact the CTN or health care provider for questions related to their healthcare. Reviewed and educated patient on any new and changed medications related to discharge diagnosis. Was patient discharged with a pulse oximeter? Had one already. Reports feeling weak, tired with dry cough but otherwise okay. Denies fever, chills, n/v/d. Has family support of her children who are well. Reviewed energy conservation, nutrition, hydration, s/s to monitor. Her SaO2 maintaining mid 90s on RA. She was educated on goal SaO2 >88% preferably >92% RA and when to seek medical attention. Reviewed inpatient tx notes per her request including Decadron, Remdesevir and tocilizumab x 1. She will start ASA 81mg daily as instructed and encouraged to follow up with her [facility] cardiologist after COVID recovery. She states she has an appointment within the next month. Reviewed that a CXR is usually indicated 4-6 weeks after COVID PNA tx and that this can be done with her PCP or she can follow up with pulm. She voices understanding. She denies needs/referrals at this time. She was instructed to discuss COVID vaccination timing with her PCP. CTN provided contact information for future needs. Plan for follow-up call in 7-14 days based on severity of symptoms and risk factors.       Raul Crandall RN  Care Transitions Nurse  [phone] mobile

## 2021-07-15 NOTE — DISCHARGE SUMMARY
Name:  Danny aShu  Room:  0210/0210-01  MRN:    [id number]    Discharge Summary      This discharge summary is in conjunction with a complete physical exam done on the day of discharge. Discharging Physician: Blake Baer MD       Admit: 7/3/2021  Discharge:  7/12/2021    HPI taken from admission H&P:      The patient is a 54 y.o. female with COPD, depression, anxiety, s/p AVR, s/p pacemaker placement who presents to [facility] with c/o shortness of breath and hypoxia. Admitted to [facility] 6/30-7/1 for COVID pneumonia. Patient reports she has not been feeling well for the last 10 days. She reports she's had fevers, cough, shortness of breath. She feels weak and fatigued. She has no appetite and has loss of taste/smell. She has not been vaccinated.       Patient with low grade temps and hypoxia. She is requiring 3 L O2. Labs with hypokalemia, elevated Alk phos and elevated AST. CXR with bilateral evolving atypical viral pneumonitis. Admitted to med-surg. Pulmonology consulted. Diagnoses this Admission and Hospital Course     Acute respiratory failure (Resolved) due to Covid 19 infection  -oxygen saturation was in the 80s at home.  87% of the time of admission in the ER. COVID 19 infection with viral pneumonia   - She has not been vaccinated.    - sent to ICU for worsening respiratory failure. - has symptoms of cough, SOB, low grade fevers, poor appetite, diarrhea.  - CXR with evolving viral pneumonitis  - pulmonology consulted.   - Received Tocilizumab  - Started on Remdesevir - > completed 5 days   Monitor LFT and renal function. - completed Decadron day # 10   -Lasix as needed  -Lovenox twice daily  -Patient was on high flow oxygen per Vapotherm for several days, hypoxemia slowly improved daily  - FiO2 down from 75% to 65% to 55 %,  O2 40 L--> 30 L.  - weaned to high flow oxygen per nasal cannula 14 L-> 12L-->8-->7L-- 3 L   - discharged home in stable condition on Final Result   Bilateral evolving atypical viral pneumonitis. Discharge Medications     Medication List      START taking these medications    aspirin 81 MG chewable tablet  Take 1 tablet by mouth daily        CONTINUE taking these medications    Biotin 5 MG Tbdp     Cholecalciferol 50 MCG (2000 UT) Caps     citalopram 10 MG tablet  Commonly known as: CELEXA     oxyCODONE-acetaminophen 7.5-325 MG per tablet  Commonly known as: PERCOCET     therapeutic multivitamin-minerals tablet        STOP taking these medications    guaiFENesin-dextromethorphan 100-10 MG/5ML syrup  Commonly known as: ROBITUSSIN DM           Where to Get Your Medications      Information about where to get these medications is not yet available    Ask your nurse or doctor about these medications  · aspirin 81 MG chewable tablet           Discharged in stable condition to home. Follow Up: Follow up with PCP in 1 week. Total time spent on discharge is 35 minutes    JADYN Treviño.

## 2021-07-22 ENCOUNTER — CARE COORDINATION (OUTPATIENT)
Dept: CASE MANAGEMENT | Age: 56
End: 2021-07-22

## 2021-07-22 NOTE — CARE COORDINATION
Patient contacted regarding COVID-19 diagnosis. COVID-19 Detected on 6/30/2021. Care Transition Nurse contacted the patient by telephone to perform follow-up assessment. Symptoms reviewed with patient who verbalized the following symptoms: cough, no new symptoms and no worsening symptoms. Due to no new or worsening symptoms encounter was not routed to provider for escalation. Patient was given an opportunity to verbalize any questions and concerns and agrees to contact the CTN or health care provider for questions related to their healthcare. Completed follow up visit with her PCP and states did not want to order CXR yet. Reviewed usually done 4-6 weeks after tx. Reviewed she had extensive stay in ICU and she may want to follow up with pulm. She asked for their number and CTN provided her with Brian Manley ([phone]) office number. She continues with dry, NP cough and fatigue. Her SaO2 remains >90s RA. She will monitor for symptoms of worsening to report and consider her f/up options. She denies needs from CTN at this time. CTN provided contact information for future reference. Plan for follow up call in 7-14 days based on severity of symptoms and risk factors. Albertina Sidhu, RN  Care Transition Nurse  [phone] mobile    No future appointments.

## 2021-07-26 ENCOUNTER — CARE COORDINATION (OUTPATIENT)
Dept: CASE MANAGEMENT | Age: 56
End: 2021-07-26

## 2021-07-26 NOTE — CARE COORDINATION
[address] Transitions Follow Up Call    2021    Patient: Karlee White  Patient : 1965   MRN: [id number]  Reason for Admission:  Acute resp failure 2/2 COVID-19 infection, hypokalemia, viral PNA, LLE swelling, PAF, cardiac pacemaker in place, s/p MVR, COPD, Depression  Discharge Date: 21 RARS: Readmission Risk Score: 16       COVID-19 Detected on 2021. Spoke with: Karlee White (patient)    CTN received inbound call from patient asking for assistance getting new patient appointment with Kamala Tejada. [facility]. Per Shanthi Hartman, Dr Andre was who completed the consult and she could send message back to Dr Kareen Amador to see if he can see her. The she notes that patient has seen Dr. Jeff Jolley of [address] [place] [address] and Sleep Medicine in 2016 and patient should follow up with this pulmonologist.     Outreach to patient to notify her of this. She states she saw him in 2016 prior to open heart surgery and barely remembers doing so. She was provided with number to both [facility] and [facility] again. She will notify CTN if she needs additional assistance. Denies needs at this time. Follow Up  No future appointments.     Monica Valente RN

## 2021-07-28 ENCOUNTER — CARE COORDINATION (OUTPATIENT)
Dept: CASE MANAGEMENT | Age: 56
End: 2021-07-28

## 2021-07-28 ENCOUNTER — TELEPHONE (OUTPATIENT)
Dept: PULMONOLOGY | Age: 56
End: 2021-07-28

## 2021-07-28 ENCOUNTER — HOSPITAL ENCOUNTER (OUTPATIENT)
Age: 56
Discharge: HOME | End: 2021-07-28
Payer: MEDICARE

## 2021-07-28 ENCOUNTER — HOSPITAL ENCOUNTER (OUTPATIENT)
Dept: GENERAL RADIOLOGY | Age: 56
Discharge: HOME | End: 2021-07-28
Payer: MEDICARE

## 2021-07-28 DIAGNOSIS — J12.82 PNEUMONIA DUE TO COVID-19 VIRUS: ICD-10-CM

## 2021-07-28 DIAGNOSIS — U07.1 PNEUMONIA DUE TO COVID-19 VIRUS: ICD-10-CM

## 2021-07-28 PROCEDURE — 71046 X-RAY EXAM CHEST 2 VIEWS: CPT

## 2021-07-28 NOTE — CARE COORDINATION
[address] Transitions Follow Up Call    2021    Patient: Ezio Guan  Patient : 1965   MRN: [id number]  Reason for Admission: Acute resp failure 2/2 COVID-19 infection, hypokalemia, viral PNA, LLE swelling, PAF, cardiac pacemaker in place, s/p MVR, COPD, Depression  Discharge Date: 21 RARS: Readmission Risk Score: 16       COVID-19 Detected on 2021. Spoke with: Maximo Hunter (patient)    Reports she had CXR at [address] today and PCP told her lungs still full of fluid. CTN reviewed xray impression, reviewed post COVID symptoms. Patient still positive for cough, severe fatigue and deconditioning, lack of taste and smell and LR. Reviewed COVID recovery can be longer than the period of self-isolation. She voices understanding. She wants to see if CTN will assist in following up with [facility] office. Outreach to [facility] office Gilberto Ramirez). She will send message to Dr Shobha Cervantes and requests CTN request referral from PCP Grady Harrison faxed to ([phone]). Outreach to PCP Grady Harrison office to request referral. Spoke with Reese Dietz. She will send message back to provider requesting referral to Dr Carlos Kevin. CTN provided her with both office and fax numbers for this communication. Patient updated. Will await call. CTN following. Patient denies other needs/concerns at this time. Follow Up  No future appointments.     Joni Reed RN

## 2021-07-28 NOTE — TELEPHONE ENCOUNTER
Nazanin Aranda [facility] nurse care coordinator calling regarding patient needing hospital fua. Pt was seen by Pulmonary in 2016 by [facility] and Sleep medicine when she up [facility]; pt has not been seen by that facility since. She was recently seen [facility] consulted. Patient still experiencing post covid symptoms including cough, severe fatigue, lack of taste, smell and LR. Patient did complete cxr on 7/28/21.  Please advise    [facility] Consult 7/4/21  ASSESSMENT:  · Acute hypoxic respiratory failure  · COVID 19 pneumonia  · S/p AVR  · Pacemaker  · Afib     PLAN:  · Droplet + precautions  · Supplemental oxygen to maintain SaO2 >92%; wean as tolerated   · Decadron 6 mg IV daily  · Start Remdesevir D#1, close monitoring of renal and LFTs  · Received tocilizumab x1  · The pt is on therapeutic lovenox pending a LE doppler for a swollen LLE     Thank you Agus Webb, * for this consult

## 2021-07-29 ENCOUNTER — TELEPHONE (OUTPATIENT)
Dept: PULMONOLOGY | Age: 56
End: 2021-07-29

## 2021-07-29 NOTE — TELEPHONE ENCOUNTER
Doc you have a 15 min spot open on 8/19 otherwise derek. Is full. Can I put this pt. There please advise.

## 2021-07-29 NOTE — TELEPHONE ENCOUNTER
There is a referral for the pt for pneumonia. I called to get her scheduled and informed her that the first avaiable appointment is in September. Pt is requesting to be seen before September if possible. Pt states that the pneumonia is still in her lungs, and is experiencing a constant cough and SOB. Pt states that she was in the hospital for 9 weeks due to the pneumonia and COVID. Please advise.

## 2021-07-30 PROBLEM — E86.0 DEHYDRATION: Status: RESOLVED | Noted: 2021-06-30 | Resolved: 2021-07-30

## 2021-07-30 NOTE — TELEPHONE ENCOUNTER
Pt already scheduled at [facility] which is better because she would have had to wait longer here.

## 2021-07-30 NOTE — TELEPHONE ENCOUNTER
Attempted to reach pt. To put in a 15 min spot on 8/19 approved by Dr. Agnieszka Castillo but no answer LM on VM to call back.

## 2021-08-19 ENCOUNTER — OFFICE VISIT (OUTPATIENT)
Dept: PULMONOLOGY | Age: 56
End: 2021-08-19
Payer: MEDICARE

## 2021-08-19 VITALS
WEIGHT: 147.2 LBS | RESPIRATION RATE: 16 BRPM | OXYGEN SATURATION: 100 % | HEART RATE: 87 BPM | SYSTOLIC BLOOD PRESSURE: 111 MMHG | HEIGHT: 66 IN | BODY MASS INDEX: 23.66 KG/M2 | TEMPERATURE: 97.7 F | DIASTOLIC BLOOD PRESSURE: 70 MMHG

## 2021-08-19 DIAGNOSIS — G47.33 OBSTRUCTIVE SLEEP APNEA: ICD-10-CM

## 2021-08-19 DIAGNOSIS — G47.00 INSOMNIA, UNSPECIFIED TYPE: ICD-10-CM

## 2021-08-19 DIAGNOSIS — Z20.822 ENCOUNTER FOR PREPROCEDURE SCREENING LABORATORY TESTING FOR COVID-19: ICD-10-CM

## 2021-08-19 DIAGNOSIS — G47.10 HYPERSOMNOLENCE: ICD-10-CM

## 2021-08-19 DIAGNOSIS — J44.9 CHRONIC OBSTRUCTIVE PULMONARY DISEASE, UNSPECIFIED COPD TYPE (HCC): ICD-10-CM

## 2021-08-19 DIAGNOSIS — Z01.812 ENCOUNTER FOR PREPROCEDURE SCREENING LABORATORY TESTING FOR COVID-19: ICD-10-CM

## 2021-08-19 DIAGNOSIS — Z86.16 HISTORY OF 2019 NOVEL CORONAVIRUS DISEASE (COVID-19): Primary | ICD-10-CM

## 2021-08-19 PROCEDURE — 99214 OFFICE O/P EST MOD 30 MIN: CPT | Performed by: INTERNAL MEDICINE

## 2021-08-19 RX ORDER — TRAZODONE HYDROCHLORIDE 50 MG/1
TABLET ORAL
COMMUNITY
Start: 2021-08-13

## 2021-08-19 RX ORDER — ESTRADIOL 0.1 MG/G
0.1 CREAM VAGINAL
COMMUNITY
Start: 2020-10-24

## 2021-08-19 ASSESSMENT — ENCOUNTER SYMPTOMS
SHORTNESS OF BREATH: 1
SWOLLEN GLANDS: 0
EYES NEGATIVE: 1
HEMOPTYSIS: 0
WHEEZING: 1
ORTHOPNEA: 0
GASTROINTESTINAL NEGATIVE: 1
SORE THROAT: 0
ABDOMINAL PAIN: 0
SPUTUM PRODUCTION: 0
ALLERGIC/IMMUNOLOGIC NEGATIVE: 1
VOMITING: 0
RHINORRHEA: 0

## 2021-08-19 NOTE — PROGRESS NOTES
Weight loss was encouraged. ESS is 10/24             Will get Sleep study  I will call you with results          RTC in 3 months          No follow-ups on file. Subjective   SUBJECTIVE/OBJECTIVE:  This is a new consult from Dr. Shu Ying  Initial evaluation for pneumonia  Initially seen by me on 8/19/2021        Patient was recently hospitalized at [facility] and discharged 7/12/2021  Was seen in the hospital by Candy Thurston and Thai  However sent here for follow-up          Admit: 7/3/2021  Discharge:  7/12/2021     HPI taken from admission H&P:       The patient is a 47 y. o. female with COPD, depression, anxiety, s/p AVR, s/p pacemaker placement who presents to [facility] with c/o shortness of breath and hypoxia.  Admitted to [facility] 6/30-7/1 for COVID pneumonia.  Patient reports she has not been feeling well for the last 10 days.  She reports she's had fevers, cough, shortness of breath.  She feels weak and fatigued.  She has no appetite and has loss of taste/smell.  She has not been vaccinated.       Patient with low grade temps and hypoxia.  She is requiring 3 L O2. Labs with hypokalemia, elevated Alk phos and elevated AST.   CXR with bilateral evolving atypical viral pneumonitis.  Admitted to med-surg.  Pulmonology consulted.       Diagnoses this Admission and Hospital Course      Acute respiratory failure (Resolved) due to Covid 19 infection  -oxygen saturation was in the 80s at home.  87% of the time of admission in the ER. COVID 19 infection with viral pneumonia   - She has not been vaccinated.    - sent to ICU for worsening respiratory failure. - has symptoms of cough, SOB, low grade fevers, poor appetite, diarrhea.  - CXR with evolving viral pneumonitis  - pulmonology consulted.   - Received Tocilizumab  - Started on Remdesevir - > completed 5 days   Monitor LFT and renal function.   - completed Decadron day # 10   -Lasix as needed  -Lovenox twice daily  -Patient was on high flow oxygen per Vapotherm for several days, hypoxemia slowly improved daily  - FiO2 down from 75% to 65% to 55 %,  O2 40 L--> 30 L. - weaned to high flow oxygen per nasal cannula 14 L-> 12L-->8-->7L-- 3 L   - discharged home in stable condition on RA           Since hospitalized  Very weak      Still with cough  Some wheeizng  No chest pain      shx:  Only one year of smoking  occ etoh      fhx  Heart disase    Subjective:        Pt reports does snore moderate for years. Pt's  does wake   herself with dry mouth, nocturia x2, cramps, headache. Pt does report fatigue or tiredness frequently. Pt sleeps more than 7  hours, and at times overwhelming sleepiness attacks. Pt dozes unintentionally while watching tv ,movie. While driving  Does not feel drowsy / nod off / fall sleep at stop lights. Pt does not have h/o sleepiness associated wrecks/near wrecks. Pt does nod off while  unattended. Pt does report having restless legs 0 times a week. This is often accompanied by leg jerks during sleep, numbness in legs or feet, aches/burning/cramps in legs, feet. does report having nasal congestion. negative for use of nasal sprays, nose or sinus surgery. Positive for nasal polyps. Does not sleep with oxygen. Pt does have a dental appliance or braces on teeth. denies teeth grinding. Does not report nightmares, sleep walking, dreaming during naps. When angry or laughing Michelle Hall does not report cataplexy. she does not report hallucinations when dozing off or immediately upon awakening. Does not report sleep paralysis. denies parasomnia . Patient's [place] Sleepiness score  is required. Please contact your  to configure this SmartLink.(AMB#[id number]) is CONSISTENT with moderate daytime sleepiness. Shortness of Breath  This is a new problem. The current episode started more than 1 month ago. The problem occurs every several days. The problem has been waxing and waning.  Associated symptoms include wheezing. Pertinent negatives include no abdominal pain, chest pain, claudication, coryza, ear pain, fever, headaches, hemoptysis, leg pain, leg swelling, neck pain, orthopnea, PND, rash, rhinorrhea, sore throat, sputum production, swollen glands, syncope or vomiting. Review of Systems   Constitutional: Negative. Negative for fever. HENT: Negative. Negative for ear pain, rhinorrhea and sore throat. Eyes: Negative. Respiratory: Positive for shortness of breath and wheezing. Negative for hemoptysis and sputum production. Cardiovascular: Negative. Negative for chest pain, orthopnea, claudication, leg swelling, syncope and PND. Gastrointestinal: Negative. Negative for abdominal pain and vomiting. Endocrine: Negative. Genitourinary: Negative. Musculoskeletal: Negative. Negative for neck pain. Skin: Negative. Negative for rash. Allergic/Immunologic: Negative. Neurological: Negative. Negative for headaches. Hematological: Negative. Psychiatric/Behavioral: Negative. Objective   Physical Exam  Vitals and nursing note reviewed. Constitutional:       General: She is not in acute distress. Appearance: Normal appearance. She is not ill-appearing. HENT:      Head: Normocephalic and atraumatic. Right Ear: External ear normal.      Left Ear: External ear normal.      Nose: Nose normal.      Mouth/Throat:      Mouth: Mucous membranes are moist.      Pharynx: Oropharynx is clear. Comments: Mallampati 3  Eyes:      General: No scleral icterus. Extraocular Movements: Extraocular movements intact. Conjunctiva/sclera: Conjunctivae normal.      Pupils: Pupils are equal, round, and reactive to light. Cardiovascular:      Rate and Rhythm: Normal rate and regular rhythm. Pulses: Normal pulses. Heart sounds: Normal heart sounds. No murmur heard. No friction rub. Pulmonary:      Effort: No respiratory distress. Breath sounds:  No stridor. No wheezing, rhonchi or rales. Abdominal:      General: Abdomen is flat. Bowel sounds are normal. There is no distension. Tenderness: There is no abdominal tenderness. There is no guarding. Musculoskeletal:         General: No swelling or tenderness. Normal range of motion. Cervical back: Normal range of motion and neck supple. No rigidity. Skin:     General: Skin is warm and dry. Coloration: Skin is not jaundiced. Neurological:      General: No focal deficit present. Mental Status: She is alert and oriented to person, place, and time. Mental status is at baseline. Cranial Nerves: No cranial nerve deficit. Sensory: No sensory deficit. Motor: No weakness. Gait: Gait normal.   Psychiatric:         Mood and Affect: Mood normal.         Thought Content:  Thought content normal.         Judgment: Judgment normal.                  An electronic signature was used to authenticate this note.    --Kentrell Goff MD

## 2021-08-19 NOTE — PATIENT INSTRUCTIONS
ASSESSMENT/PLAN:  1. History of 2019 novel coronavirus disease (COVID-19)  -     Full PFT Study With Bronchodilator; Future  -     Bronchial Challenge; Future  -     XR CHEST (2 VW); Future  2. Chronic obstructive pulmonary disease, unspecified COPD type ([facility] [address]  -     Full PFT Study With Bronchodilator; Future  -     Bronchial Challenge; Future  3. Obstructive sleep apnea  -     Baseline Diagnostic Sleep Study; Future  4. Hypersomnolence  -     Baseline Diagnostic Sleep Study; Future  5. Insomnia, unspecified type  -     Baseline Diagnostic Sleep Study; Future    Patient was hospitalized in July with Covid pneumonia  sent to ICU for worsening respiratory failure. - has symptoms of cough, SOB, low grade fevers, poor appetite, diarrhea. - pulmonology consulted.   - Received Tocilizumab  - Started on Remdesevir - > completed 5 days  - completed Decadron day # 10   Last chest x-ray 7/28/2021  Impression   Mildly improved but incompletely resolved multifocal bibasilar airspace   opacities which may be the result of viral pneumonia.             Will ask to start on  mucinex 600 mg twice a day for the next 1-2 weeks. Continue with   Albuterol  2 puffs as needed      Will get   PFT and methacholine  cxr      COVID vaccine not done          I  RECOMMENDATIONS:     she will be scheduled for polysomnography in order to evaluate for the presence and severity of obstructive sleep apnea. He was given a discussion of the pathophysiology, evaluation and treatment of apnea. Thyroid function tests are recommended if not done recently. Advised to avoid driving when too sleepy to function safely and given a discussion of the risks of untreated apnea such as accidents, cognitive impairment, mood impairment, high blood pressure, various cardiac diseases and stroke. Weight loss was encouraged.        ESS is 10/24             Will get Sleep study  I will call you with results          RTC in 3 months    Remember to bring a list of pulmonary medications and any CPAP or BiPAP machines to your next appointment with the office. Please keep all of your future appointments scheduled by [facility], [facility]monary office. Out of respect for other patients and providers, you may be asked to reschedule your appointment if you arrive later than your scheduled appointment time. Appointments cancelled less than 24hrs in advance will be considered a no show. Patients with three missed appointments within 1 year or four missed appointments within 2 years can be dismissed from the practice. Please be aware that our physicians are required to work in the Intensive Care Unit at [facility].  Your appointment may need to be rescheduled if they are designated to work during your appointment time. You may receive a survey regarding the care you received during your visit. Your input is valuable to us. We encourage you to complete and return your survey. We hope you will choose us in the future for your healthcare needs. Pt instructed of all future appointment dates & times, including radiology, labs, procedures & referrals. If procedures were scheduled preparation instructions provided. Instructions on future appointments with [facility] Pul[facility] were given.

## 2021-08-19 NOTE — LETTER
8/19/21        Arjun Arredondo      I have seen this patient in the office today and wanted to communicate my findings and recommendations. Patient Instructions            ASSESSMENT/PLAN:  1. History of 2019 novel coronavirus disease (COVID-19)  -     Full PFT Study With Bronchodilator; Future  -     Bronchial Challenge; Future  -     XR CHEST (2 VW); Future  2. Chronic obstructive pulmonary disease, unspecified COPD type ([address]  -     Full PFT Study With Bronchodilator; Future  -     Bronchial Challenge; Future  3. Obstructive sleep apnea  -     Baseline Diagnostic Sleep Study; Future  4. Hypersomnolence  -     Baseline Diagnostic Sleep Study; Future  5. Insomnia, unspecified type  -     Baseline Diagnostic Sleep Study; Future    Patient was hospitalized in July with Covid pneumonia  sent to ICU for worsening respiratory failure. - has symptoms of cough, SOB, low grade fevers, poor appetite, diarrhea. - pulmonology consulted.   - Received Tocilizumab  - Started on Remdesevir - > completed 5 days  - completed Decadron day # 10   Last chest x-ray 7/28/2021  Impression   Mildly improved but incompletely resolved multifocal bibasilar airspace   opacities which may be the result of viral pneumonia.             Will ask to start on  mucinex 600 mg twice a day for the next 1-2 weeks. Continue with   Albuterol  2 puffs as needed      Will get   PFT and methacholine  cxr      COVID vaccine not done          I  RECOMMENDATIONS:     she will be scheduled for polysomnography in order to evaluate for the presence and severity of obstructive sleep apnea. He was given a discussion of the pathophysiology, evaluation and treatment of apnea. Thyroid function tests are recommended if not done recently.      Advised to avoid driving when too sleepy to function safely and given a discussion of the risks of untreated apnea such as accidents, cognitive impairment, mood impairment, high blood pressure, various cardiac diseases and stroke. Weight loss was encouraged.        ESS is 10/24             Will get Sleep study  I will call you with results          RTC in 3 months                         Thank you for allowing me to assist in the care of the Tj Seals MD

## 2021-08-19 NOTE — PROGRESS NOTES
MA Communication:   The following orders are received by verbal communication from Nalini Longo MD    Orders include:  PSG (sleep center to contact pt)       PFT/MCT and CXR scheduled 9/16/21       31-90 day scheduled 11/11/21       Pt informed to get COVID test

## 2021-09-01 ENCOUNTER — OFFICE VISIT (OUTPATIENT)
Dept: ORTHOPEDIC SURGERY | Age: 56
End: 2021-09-01
Payer: MEDICARE

## 2021-09-01 VITALS — BODY MASS INDEX: 23.78 KG/M2 | WEIGHT: 148 LBS | HEIGHT: 66 IN | RESPIRATION RATE: 16 BRPM

## 2021-09-01 DIAGNOSIS — M17.11 PRIMARY OSTEOARTHRITIS OF RIGHT KNEE: ICD-10-CM

## 2021-09-01 DIAGNOSIS — M17.12 PRIMARY OSTEOARTHRITIS OF LEFT KNEE: ICD-10-CM

## 2021-09-01 DIAGNOSIS — S52.502A CLOSED FRACTURE OF DISTAL END OF LEFT RADIUS, UNSPECIFIED FRACTURE MORPHOLOGY, INITIAL ENCOUNTER: Primary | ICD-10-CM

## 2021-09-01 PROCEDURE — 20610 DRAIN/INJ JOINT/BURSA W/O US: CPT | Performed by: ORTHOPAEDIC SURGERY

## 2021-09-01 PROCEDURE — 99214 OFFICE O/P EST MOD 30 MIN: CPT | Performed by: ORTHOPAEDIC SURGERY

## 2021-09-03 NOTE — PROGRESS NOTES
CHIEF COMPLAINT:   1-Bilateral knee pain/ osteoarthritis. 2-Left distal radius 3 parts intra-articular displaced fracture, status post ORIF, 1/4/2021. HISTORY:  Ms. Elio Austin 54 y.o.  female presents today for follow up visit for evaluation of bilateral knee pain which started to worsen gradually over the past few years. She had a cortisone injection on 5/12/2021 and had good improvement until the past couple weeks. She is complaining of achy pain. Pain is increase with standing and walking and decrease with rest. Pain is sharp early in the morning with first few steps, dull achy pain by the end of the day. Alleviating factors: rest.  Rates pain 8/10 VAS in her knees. Pain is much worse with exercise. No radiation and no numbness and tingling sensation. She has not had any treatment for this problem. No other complaint. No h/o trauma or gout. She is also here for for postop visit left wrist and states her pain is a 3/10 VAS. Pain does not occur every day and is very intermittent. Pain is worse with increased use of the left wrist and better with rest and massage. She has been working on range of motion on her own with improvement. No numbness or tingling sensation. No fever or Chills. Denies smoking.     Past Medical History:   Diagnosis Date    Anxiety and depression     Aortic valve disease 2016    Arthritis     COPD (chronic obstructive pulmonary disease) ([facility] [address]     COVID-19 06/30/2021    History of blood transfusion     Pacemaker     Medtronic       Past Surgical History:   Procedure Laterality Date    BACK SURGERY      rods/screw L4,5,6/ DJD    CARDIAC SURGERY  2017    aortic valve replacement and hole in heart    FOREARM SURGERY Left 1/4/2021    OPEN REDUCTION INTERNAL FIXATION LEFT DISTAL RADIUS performed by Modesta Willams [facility] at [facility]      screws and pins    PACEMAKER PLACEMENT  2017    medtronic model # A2DR01    SINUS SURGERY         Social History Socioeconomic History    Marital status:      Spouse name: Not on file    Number of children: Not on file    Years of education: Not on file    Highest education level: Not on file   Occupational History    Not on file   Tobacco Use    Smoking status: Never Smoker    Smokeless tobacco: Never Used   Vaping Use    Vaping Use: Never used   Substance and Sexual Activity    Alcohol use: Yes     Comment: weekly couple beers    Drug use: Never    Sexual activity: Yes     Partners: Male   Other Topics Concern    Not on file   Social History Narrative    Not on file     Social Determinants of Health     Financial Resource Strain:     Difficulty of Paying Living Expenses:    Food Insecurity:     Worried About Running Out of Food in the Last Year:     [address] in the Last Year:    Transportation Needs:     Lack of Transportation (Medical):      Lack of Transportation (Non-Medical):    Physical Activity:     Days of Exercise per Week:     Minutes of Exercise per Session:    Stress:     Feeling of Stress :    Social Connections:     Frequency of Communication with Friends and Family:     Frequency of Social Gatherings with Friends and Family:     Attends Faith Services:     Active Member of Clubs or Organizations:     Attends Club or Organization Meetings:     Marital Status:    Intimate Partner Violence:     Fear of Current or Ex-Partner:     Emotionally Abused:     Physically Abused:     Sexually Abused:        Family History   Problem Relation Age of Onset    No Known Problems Mother     Heart Attack Father        Current Outpatient Medications on File Prior to Visit   Medication Sig Dispense Refill    estradiol (ESTRACE) 0.1 MG/GM vaginal cream Place 0.1 g vaginally every 7 days      tiotropium (SPIRIVA RESPIMAT) 2.5 MCG/ACT AERS inhaler Inhale 1 puff into the lungs 2 times daily      traZODone (DESYREL) 50 MG tablet       aspirin 81 MG chewable tablet Take 1 tablet by mouth daily 30 tablet 0    Biotin 5 MG TBDP Take 1 tablet by mouth daily      Cholecalciferol 50 MCG (2000 UT) CAPS 2,000 Units daily       oxyCODONE-acetaminophen (PERCOCET) 7.5-325 MG per tablet Take 1 tablet by mouth every 4 hours as needed for Pain .  citalopram (CELEXA) 10 MG tablet Take 10 mg by mouth daily      Multiple Vitamins-Minerals (THERAPEUTIC MULTIVITAMIN-MINERALS) tablet Take 1 tablet by mouth daily       No current facility-administered medications on file prior to visit. Pertinent items are noted in HPI  Review of systems reviewed from Patient History Form dated on 1/12/2021 and available in the patient's chart under the Media tab. No change. PHYSICAL EXAMINATION:  Ms. Marixa Hillman is a very pleasant 54 y.o.  female who presents today in no acute distress, awake, alert, and oriented. She is well dressed, nourished and  groomed. Patient with normal affect. Height is  5' 6\" (1.676 m), weight is 148 lb (67.1 kg), Body mass index is 23.89 kg/m². Resting respiratory rate is 16. Examination of the gait, showed that the patient walks heel-toe with a non-antalgic gait and no limp. Examination of both knees showing full ROM, bilateral mild crepitus, tenderness on medial joint line, stable to varus and valgus stress. She has intact sensation and good pedal pulses. She has good strength in 2 planes, and has mild tenderness on deep palpation over the medial joint line. Knee reflex 1+ bilaterally. On examination of the left wrist, the incision is completely healed . No signs of any erythema or drainage. She  has no pain with the active or passive range of motion of the left wrist, full ROM. She  has intact sensation, distally, and she  is neurovascularly intact. IMAGING:  Xray 3 views of the bilateral knee was obtained 5/12/2021 in the office and reviewed.   These demonstrate mild degenerative changes with narrowing of the joint space in the patellar joint space compartment, subchondral sclerosis, and marginal osteophytosis. Xray, 3 views left wrist taken today in the office showed anatomic alignment of left distal radius, plate and screws in good position, no loosening. IMPRESSION:   1-Bilateral knee DJD. 2-Left distal radius ORIF and doing very well. PLAN: For the knees: I discussed with the patient the treatment options including both surgical and non-surgical treatment. We recommended Quad exercises and stretching of the calf and hamstrings which was taught to the patient today. She will take NSAIDS as needed. I believe she will benefit from cortisone injection bilateral knee, and she agreed to have. PROCEDURE:    With the patient's permission, and under sterile condition, the bilateral knee was prepared and draped with alcohol and injected with a mixture of 1 mL Kenalog 40mg and 4 mL of 1% lidocaine through the medial parapatellar port area. The patient tolerated the procedure well. A band-aid was applied and the patient was advised to ice the knee for 15-20 minutes to relieve any injection site related pain. She reported a good improvement immediatly after the injection. F/u in 3-4 months, PT if needed. She understands that this may need surgery if the pain did not to resolve. For the wrist: She can go back to normal activity with no restrictions. ROM and wrist exercises. She will be seen PRN. I told the patient that it is not unusual to have some achy pain and swelling for up to a year after a fracture. As this patient has demonstrated risk factors for osteoporosis, such as age and evidence of a fracture, I have referred the patient back to the primary care physician for evaluation for osteoporosis, including consideration for DEXA scanning, if this is felt to be clinically indicated. The patient is advised to contact the primary care physician to follow-up for further evaluation.        Aimee Matos MD

## 2021-09-04 ENCOUNTER — HOSPITAL ENCOUNTER (OUTPATIENT)
Age: 56
Discharge: HOME | End: 2021-09-04
Payer: MEDICARE

## 2021-09-04 PROCEDURE — U0005 INFEC AGEN DETEC AMPLI PROBE: HCPCS

## 2021-09-04 PROCEDURE — U0003 INFECTIOUS AGENT DETECTION BY NUCLEIC ACID (DNA OR RNA); SEVERE ACUTE RESPIRATORY SYNDROME CORONAVIRUS 2 (SARS-COV-2) (CORONAVIRUS DISEASE [COVID-19]), AMPLIFIED PROBE TECHNIQUE, MAKING USE OF HIGH THROUGHPUT TECHNOLOGIES AS DESCRIBED BY CMS-2020-01-R: HCPCS

## 2021-09-04 RX ORDER — LIDOCAINE HYDROCHLORIDE 10 MG/ML
4 INJECTION, SOLUTION INFILTRATION; PERINEURAL ONCE
Status: SHIPPED | OUTPATIENT
Start: 2021-09-04

## 2021-09-04 RX ORDER — TRIAMCINOLONE ACETONIDE 40 MG/ML
40 INJECTION, SUSPENSION INTRA-ARTICULAR; INTRAMUSCULAR ONCE
Status: SHIPPED | OUTPATIENT
Start: 2021-09-04

## 2021-09-05 LAB — SARS-COV-2: NOT DETECTED

## 2021-09-07 ENCOUNTER — HOSPITAL ENCOUNTER (OUTPATIENT)
Dept: SLEEP CENTER | Age: 56
Discharge: HOME | End: 2021-09-09
Payer: MEDICARE

## 2021-09-07 DIAGNOSIS — G47.10 HYPERSOMNOLENCE: ICD-10-CM

## 2021-09-07 DIAGNOSIS — G47.00 INSOMNIA, UNSPECIFIED TYPE: ICD-10-CM

## 2021-09-07 DIAGNOSIS — G47.33 OBSTRUCTIVE SLEEP APNEA: ICD-10-CM

## 2021-09-07 PROCEDURE — 95810 POLYSOM 6/> YRS 4/> PARAM: CPT

## 2021-09-08 ENCOUNTER — TELEPHONE (OUTPATIENT)
Dept: PULMONOLOGY | Age: 56
End: 2021-09-08

## 2021-09-08 PROCEDURE — 95810 POLYSOM 6/> YRS 4/> PARAM: CPT | Performed by: INTERNAL MEDICINE

## 2021-09-08 NOTE — TELEPHONE ENCOUNTER
I talked with the patient about the sleep study  She has been not having issues with sleeping  Having some issues with restless legs empirically movements  She was prescribed by her primary care physician ropinirole 0.5 mg  I have instructed her to start taking this tonight and to use it for the next week if it does not seem to be working increase to 2 tablets for a total of 1 mg at night    Patient will call me back in about a week to 2 about this medication      I will have her office check to see about the PFT and methacholine challenge and chest x-ray that were ordered  She does not remember when or where she supposed to get these test taken care of

## 2021-09-28 ENCOUNTER — HOSPITAL ENCOUNTER (OUTPATIENT)
Dept: PULMONOLOGY | Age: 56
Discharge: HOME | End: 2021-09-28
Payer: MEDICARE

## 2021-09-28 ENCOUNTER — HOSPITAL ENCOUNTER (OUTPATIENT)
Age: 56
Discharge: HOME | End: 2021-09-28
Payer: MEDICARE

## 2021-09-28 ENCOUNTER — HOSPITAL ENCOUNTER (OUTPATIENT)
Dept: GENERAL RADIOLOGY | Age: 56
Discharge: HOME | End: 2021-09-28
Payer: MEDICARE

## 2021-09-28 DIAGNOSIS — Z86.16 HISTORY OF 2019 NOVEL CORONAVIRUS DISEASE (COVID-19): ICD-10-CM

## 2021-09-28 DIAGNOSIS — J44.9 CHRONIC OBSTRUCTIVE PULMONARY DISEASE, UNSPECIFIED COPD TYPE (HCC): ICD-10-CM

## 2021-09-28 LAB
DLCO %PRED: 80 %
DLCO PRED: NORMAL
DLCO/VA %PRED: NORMAL
DLCO/VA PRED: NORMAL
DLCO/VA: NORMAL
DLCO: NORMAL
EXPIRATORY TIME-POST: NORMAL
EXPIRATORY TIME: NORMAL
FEF 25-75% %CHNG: NORMAL
FEF 25-75% %PRED-POST: NORMAL
FEF 25-75% %PRED-PRE: NORMAL
FEF 25-75% PRED: NORMAL
FEF 25-75%-POST: NORMAL
FEF 25-75%-PRE: NORMAL
FEV1 %PRED-POST: 0 %
FEV1 %PRED-PRE: 95 %
FEV1 PRED: NORMAL
FEV1-POST: NORMAL
FEV1-PRE: NORMAL
FEV1/FVC %PRED-POST: NORMAL
FEV1/FVC %PRED-PRE: NORMAL
FEV1/FVC PRED: NORMAL
FEV1/FVC-POST: 0 %
FEV1/FVC-PRE: 83 %
FVC %PRED-POST: NORMAL
FVC %PRED-PRE: NORMAL
FVC PRED: NORMAL
FVC-POST: NORMAL
FVC-PRE: NORMAL
GAW %PRED: NORMAL
GAW PRED: NORMAL
GAW: NORMAL
IC %PRED: NORMAL
IC PRED: NORMAL
IC: NORMAL
MEP: NORMAL
MIP: NORMAL
MVV %PRED-PRE: NORMAL
MVV PRED: NORMAL
MVV-PRE: NORMAL
PEF %PRED-POST: NORMAL
PEF %PRED-PRE: NORMAL
PEF PRED: NORMAL
PEF%CHNG: NORMAL
PEF-POST: NORMAL
PEF-PRE: NORMAL
RAW %PRED: NORMAL
RAW PRED: NORMAL
RAW: NORMAL
RV %PRED: NORMAL
RV PRED: NORMAL
RV: NORMAL
SVC %PRED: NORMAL
SVC PRED: NORMAL
SVC: NORMAL
TLC %PRED: 99 %
TLC PRED: NORMAL
TLC: NORMAL
VA %PRED: NORMAL
VA PRED: NORMAL
VA: NORMAL
VTG %PRED: NORMAL
VTG PRED: NORMAL
VTG: NORMAL

## 2021-09-28 PROCEDURE — 71046 X-RAY EXAM CHEST 2 VIEWS: CPT

## 2021-09-28 PROCEDURE — 94760 N-INVAS EAR/PLS OXIMETRY 1: CPT

## 2021-09-28 PROCEDURE — 94010 BREATHING CAPACITY TEST: CPT

## 2021-09-28 PROCEDURE — 6360000002 HC RX W HCPCS: Performed by: INTERNAL MEDICINE

## 2021-09-28 PROCEDURE — 94726 PLETHYSMOGRAPHY LUNG VOLUMES: CPT

## 2021-09-28 PROCEDURE — 94729 DIFFUSING CAPACITY: CPT

## 2021-09-28 PROCEDURE — 94070 EVALUATION OF WHEEZING: CPT

## 2021-09-28 RX ORDER — ALBUTEROL SULFATE 2.5 MG/3ML
2.5 SOLUTION RESPIRATORY (INHALATION) ONCE
Status: COMPLETED | OUTPATIENT
Start: 2021-09-28 | End: 2021-09-28

## 2021-09-28 RX ADMIN — METHACHOLINE CHLORIDE 0.25 MG: 100 POWDER, FOR SOLUTION RESPIRATORY (INHALATION) at 13:06

## 2021-09-28 RX ADMIN — METHACHOLINE CHLORIDE 2.5 MG: 100 POWDER, FOR SOLUTION RESPIRATORY (INHALATION) at 13:12

## 2021-09-28 RX ADMIN — METHACHOLINE CHLORIDE 25 MG: 100 POWDER, FOR SOLUTION RESPIRATORY (INHALATION) at 13:25

## 2021-09-28 RX ADMIN — METHACHOLINE CHLORIDE 10 MG: 100 POWDER, FOR SOLUTION RESPIRATORY (INHALATION) at 13:18

## 2021-09-28 RX ADMIN — ALBUTEROL SULFATE 2.5 MG: 2.5 SOLUTION RESPIRATORY (INHALATION) at 13:33

## 2021-09-28 ASSESSMENT — PULMONARY FUNCTION TESTS
FEV1/FVC_PRE: 83
FEV1_PERCENT_PREDICTED_PRE: 95
FEV1_PERCENT_PREDICTED_POST: 0
FEV1/FVC_POST: 0

## 2021-09-30 NOTE — PROCEDURES
[address] [address]                               PULMONARY FUNCTION    PATIENT NAME: Juaquin Alamo                   :        1965  MED REC NO:   [id number]                          ROOM:  ACCOUNT NO:   [de-identified]                           ADMIT DATE: 2021  PROVIDER:     Ana Lebron MD    DATE OF PROCEDURE:  2021    PFT INTERPRETATION  The patient is a 66-year-old female who underwent a PFT with history of  2019 coronavirus infection. Spirometry shows FVC to be 90%, FEV1 to be  95%, FEV1 to FVC ratio was 105%, FEF25%-75% was 98%. Postbronchodilator  test was not done. Lung volume shows the total lung capacity was  normal.  The patient does have some air trapping and hyperinflation. The patient's diffusion capacity when adjusted for volume was normal.   The patient's flow-volume loop was normal.  The patient also underwent a  methacholine challenge test which was negative. On the basis of this  PFT, the patient does not have any significant obstructive airway  disease or any restrictive lung disease. The patient does have some air  trapping and hyperinflation suggestive of some emphysema or asthma like  tendency without any significant reactive airway disease. Please  correlate clinically.         Alex Gonzalez MD    D: 2021 17:16:45       T: 2021 17:19:04     [email or web address]  Job#: [id number]     Doc#: [id number]    CC:

## 2021-10-06 ENCOUNTER — TELEPHONE (OUTPATIENT)
Dept: PULMONOLOGY | Age: 56
End: 2021-10-06

## 2021-10-07 NOTE — TELEPHONE ENCOUNTER
9/28/2021 1:47 pm       COMPARISON:   07/28/2021       HISTORY:   ORDERING SYSTEM PROVIDED HISTORY: History of 2019 novel coronavirus disease   (COVID-19)   TECHNOLOGIST PROVIDED HISTORY:   Reason for Exam: hx of cvoid 19   Acuity: Acute   Type of Exam: Initial       FINDINGS:   The cardiomediastinal silhouette is normal in size.  Stable post sternotomy   changes are present.       There has been marked interval improvement of bilateral pulmonary infiltrates   compared to the previous exam.  Patchy residual postinflammatory changes are   present involving the bilateral lower lobes.       No new infiltrate identified.  No pleural effusion or pneumothorax.           Impression   Marked interval improvement of bilateral pulmonary infiltrates compared to   July 28, 2021.               DATE OF PROCEDURE:  09/28/2021     PFT INTERPRETATION  The patient is a 80-year-old female who underwent a PFT with history of  2019 coronavirus infection. Spirometry shows FVC to be 90%, FEV1 to be  95%, FEV1 to FVC ratio was 105%, FEF25%-75% was 98%. Postbronchodilator  test was not done. Lung volume shows the total lung capacity was  normal.  The patient does have some air trapping and hyperinflation. The patient's diffusion capacity when adjusted for volume was normal.   The patient's flow-volume loop was normal.  The patient also underwent a  methacholine challenge test which was negative. On the basis of this  PFT, the patient does not have any significant obstructive airway  disease or any restrictive lung disease. The patient does have some air  trapping and hyperinflation suggestive of some emphysema or asthma like  tendency without any significant reactive airway disease.   Please  correlate clinically.            Called and left a message about the chest x-ray and the PFT  Reported that the chest x-ray and the PFT were normal  Methacholine was negative    She can use the albuterol as needed  She was instructed the last time I called her about her sleep study to increase her ropinirole to the 1 mg  If she is doing well she has an appointment coming up with me in November and she can cancel and see me as needed

## 2021-12-11 ENCOUNTER — HOSPITAL ENCOUNTER (EMERGENCY)
Age: 56
Discharge: HOME | End: 2021-12-11
Attending: EMERGENCY MEDICINE
Payer: MEDICARE

## 2021-12-11 ENCOUNTER — APPOINTMENT (OUTPATIENT)
Dept: GENERAL RADIOLOGY | Age: 56
End: 2021-12-11
Payer: MEDICARE

## 2021-12-11 VITALS
RESPIRATION RATE: 18 BRPM | WEIGHT: 145 LBS | OXYGEN SATURATION: 100 % | TEMPERATURE: 98.7 F | HEIGHT: 66 IN | HEART RATE: 81 BPM | DIASTOLIC BLOOD PRESSURE: 77 MMHG | SYSTOLIC BLOOD PRESSURE: 122 MMHG | BODY MASS INDEX: 23.3 KG/M2

## 2021-12-11 DIAGNOSIS — R05.9 COUGH: Primary | ICD-10-CM

## 2021-12-11 DIAGNOSIS — Z20.822 ENCOUNTER FOR LABORATORY TESTING FOR COVID-19 VIRUS: ICD-10-CM

## 2021-12-11 LAB
RAPID INFLUENZA  B AGN: NEGATIVE
RAPID INFLUENZA A AGN: NEGATIVE

## 2021-12-11 PROCEDURE — U0005 INFEC AGEN DETEC AMPLI PROBE: HCPCS

## 2021-12-11 PROCEDURE — 99284 EMERGENCY DEPT VISIT MOD MDM: CPT

## 2021-12-11 PROCEDURE — 6370000000 HC RX 637 (ALT 250 FOR IP): Performed by: EMERGENCY MEDICINE

## 2021-12-11 PROCEDURE — 87804 INFLUENZA ASSAY W/OPTIC: CPT

## 2021-12-11 PROCEDURE — U0003 INFECTIOUS AGENT DETECTION BY NUCLEIC ACID (DNA OR RNA); SEVERE ACUTE RESPIRATORY SYNDROME CORONAVIRUS 2 (SARS-COV-2) (CORONAVIRUS DISEASE [COVID-19]), AMPLIFIED PROBE TECHNIQUE, MAKING USE OF HIGH THROUGHPUT TECHNOLOGIES AS DESCRIBED BY CMS-2020-01-R: HCPCS

## 2021-12-11 PROCEDURE — 71045 X-RAY EXAM CHEST 1 VIEW: CPT

## 2021-12-11 RX ORDER — BENZONATATE 100 MG/1
100 CAPSULE ORAL ONCE
Status: COMPLETED | OUTPATIENT
Start: 2021-12-11 | End: 2021-12-11

## 2021-12-11 RX ORDER — BENZONATATE 100 MG/1
100 CAPSULE ORAL 2 TIMES DAILY PRN
Qty: 14 CAPSULE | Refills: 0 | Status: SHIPPED | OUTPATIENT
Start: 2021-12-11 | End: 2021-12-18

## 2021-12-11 RX ADMIN — BENZONATATE 100 MG: 100 CAPSULE ORAL at 12:36

## 2021-12-11 ASSESSMENT — ENCOUNTER SYMPTOMS
NAUSEA: 0
COUGH: 1
VOICE CHANGE: 0
SORE THROAT: 1
VOMITING: 0
TROUBLE SWALLOWING: 0
SHORTNESS OF BREATH: 0
DIARRHEA: 0

## 2021-12-11 NOTE — ED PROVIDER NOTES
[facility]  eMERGENCY dEPARTMENT eNCOUnter      Pt Name: Souleymane Mehta  MRN: [id number]  [place] 1965  Date of evaluation: 12/11/2021  Provider: Amanda Hernández MD    [address] [place] [address]       Chief Complaint   Patient presents with    Cough     prod cough with yellow mucus, scratchy throat x3 days, denies use of otc meds         HISTORY OF PRESENT ILLNESS   (Location/Symptom, Timing/Onset, Context/Setting, Quality, Duration, Modifying Factors, Severity)  Note limiting factors. Souleymane Mehta is a 54 y.o. female with previous history of COVID 6 months ago and COPD who presents due to 3 days of scratchy throat, cough productive of yellow sputum, and congestion. Patient has any chest pain or shortness of breath. Patient has any fever or significant difficulty breathing. But symptoms are mild, constant, and worsening. She denies any known aggravating or alleviating factors. HPI    Nursing Notes were reviewed. REVIEW OFSYSTEMS    (2-9 systems for level 4, 10 or more for level 5)     Review of Systems   Constitutional: Negative for appetite change and fever. HENT: Positive for congestion, ear pain and sore throat. Negative for trouble swallowing and voice change. Eyes: Negative for visual disturbance. Respiratory: Positive for cough. Negative for shortness of breath. Cardiovascular: Negative for chest pain and palpitations. Gastrointestinal: Negative for diarrhea, nausea and vomiting. Genitourinary: Negative for dysuria. Musculoskeletal: Negative for gait problem. Neurological: Negative for seizures and syncope. Psychiatric/Behavioral: Negative for self-injury and suicidal ideas. Except as noted above the remainder of the review of systems was reviewed and negative.        PAST MEDICAL HISTORY     Past Medical History:   Diagnosis Date    Anxiety and depression     Aortic valve disease 2016    Arthritis     COPD (chronic obstructive pulmonary disease) Vaping Use: Never used   Substance and Sexual Activity    Alcohol use: Yes     Comment: weekly couple beers    Drug use: Never    Sexual activity: Yes     Partners: Male   Other Topics Concern    None   Social History Narrative    None     Social Determinants of Health     Financial Resource Strain:     Difficulty of Paying Living Expenses: Not on file   Food Insecurity:     Worried About Running Out of Food in the Last Year: Not on file    Shola of Food in the Last Year: Not on file   Transportation Needs:     Lack of Transportation (Medical): Not on file    Lack of Transportation (Non-Medical): Not on file   Physical Activity:     Days of Exercise per Week: Not on file    Minutes of Exercise per Session: Not on file   Stress:     Feeling of Stress : Not on file   Social Connections:     Frequency of Communication with Friends and Family: Not on file    Frequency of Social Gatherings with Friends and Family: Not on file    Attends Sikh Services: Not on file    Active Member of [address] [place] [address] or Organizations: Not on file    Attends Club or Organization Meetings: Not on file    Marital Status: Not on file   Intimate Partner Violence:     Fear of Current or Ex-Partner: Not on file    Emotionally Abused: Not on file    Physically Abused: Not on file    Sexually Abused: Not on file   Housing Stability:     Unable to Pay for Housing in the Last Year: Not on file    Number of [place] in the Last Year: Not on file    Unstable Housing in the Last Year: Not on file         PHYSICAL EXAM    (up to 7 for level 4, 8 or more for level 5)     ED Triage Vitals [12/11/21 1133]   BP Temp Temp Source Pulse Resp SpO2 Height Weight   122/77 98.7 °F (37.1 °C) Oral 81 18 100 % 5' 6\" (1.676 m) 145 lb (65.8 kg)       Physical Exam  Constitutional:       General: She is not in acute distress. Appearance: She is well-developed. Comments: Pleasant and cooperative. Nontoxic-appearing.   In no acute distress. HENT:      Head: Normocephalic and atraumatic. Right Ear: Tympanic membrane, ear canal and external ear normal.      Left Ear: Tympanic membrane, ear canal and external ear normal.      Mouth/Throat:      Mouth: Mucous membranes are moist.      Pharynx: Oropharynx is clear. No oropharyngeal exudate or posterior oropharyngeal erythema. Eyes:      Conjunctiva/sclera: Conjunctivae normal.   Neck:      Vascular: No JVD. Cardiovascular:      Rate and Rhythm: Normal rate. Pulmonary:      Effort: Pulmonary effort is normal. No respiratory distress. Abdominal:      Tenderness: There is no abdominal tenderness. There is no rebound. Musculoskeletal:         General: No deformity. Neurological:      Mental Status: She is alert. DIAGNOSTIC RESULTS       RADIOLOGY:     Interpretation per the Radiologist below, if available at the time of this note:    XR CHEST PORTABLE   Final Result   No radiographic evidence of acute pulmonary disease. ED BEDSIDE ULTRASOUND:   Performed by ED Physician - none    LABS:  Labs Reviewed   RAPID INFLUENZA A/B ANTIGENS    Narrative:     Performed at:  [facility]. [facility]  [address] [place] [address]. [address]   Phone [phone]       All otherlabs were within normal range or not returned as of this dictation. EMERGENCY DEPARTMENT COURSE and DIFFERENTIAL DIAGNOSIS/MDM:   Vitals:    Vitals:    12/11/21 1133   BP: 122/77   Pulse: 81   Resp: 18   Temp: 98.7 °F (37.1 °C)   TempSrc: Oral   SpO2: 100%   Weight: 145 lb (65.8 kg)   Height: 5' 6\" (1.676 m)         MDM  The patient was counseled to closely monitor their symptoms, and to return immediately to the Emergency Department for any difficulty breathing, confusion, dizziness or passing out, vomiting, chest pain, high fevers, weakness, or any other new or concerning symptoms.   There is no evidence of emergent medical condition at this time, and the patient will be discharged in good condition. Covid test is obtained and is pending at time of discharge. The patient was specifically advised their symptoms are consistent with possible COVID-19 infection, and they need to self-isolate at home and restrict any activities outside of their home except for seeking medical care, and to wear a facemask whenever around others. The patient was provided specific instructions related to COVID-19, along with recommendations for proper respiratory hygiene and instructions on prevention of transmission of infection to others. Procedures    FINAL IMPRESSION      1. Cough    2. Encounter for laboratory testing for COVID-19 virus          DISPOSITION/PLAN   DISPOSITION Decision To Discharge 12/11/2021 01:36:30 PM      PATIENT REFERRED TO:  Jamil Lugo MD  [facility] , [address] [place], [place] [id number]    In 5 days      [facility] [id number]. [facility] Emergency Department  [address] [place] [address],[address]  [phone]    If symptoms worsen      DISCHARGE MEDICATIONS:  New Prescriptions    BENZONATATE (TESSALON PERLES) 100 MG CAPSULE    Take 1 capsule by mouth 2 times daily as needed for Cough          (Please note that portions of this note were completed with a voice recognition program.  Efforts were made to edit the dictations but occasionally words aremis-transcribed. )    Clarita Runner, MD (electronically signed)  Attending Emergency Physician           Clarita Runner, MD  12/11/21 [id number]

## 2021-12-11 NOTE — ED NOTES
Discharge instructions and medications reviewed with patient. Patient verbalized understanding of medications and follow up. All questions answered at this time. Skin warm, pink, and dry. Patient alert and oriented x4. Pt ambulated to lobby with a stable gait. Patient discharged home with 1 prescriptions.       Analy Lawson RN  12/11/21 [id number]

## 2021-12-12 LAB — SARS-COV-2: NOT DETECTED

## 2021-12-14 ENCOUNTER — OFFICE VISIT (OUTPATIENT)
Dept: ORTHOPEDIC SURGERY | Age: 56
End: 2021-12-14
Payer: MEDICAID

## 2021-12-14 VITALS — WEIGHT: 145 LBS | BODY MASS INDEX: 23.3 KG/M2 | HEIGHT: 66 IN

## 2021-12-14 DIAGNOSIS — M17.11 PRIMARY OSTEOARTHRITIS OF RIGHT KNEE: ICD-10-CM

## 2021-12-14 DIAGNOSIS — M17.12 PRIMARY OSTEOARTHRITIS OF LEFT KNEE: Primary | ICD-10-CM

## 2021-12-14 PROCEDURE — 20610 DRAIN/INJ JOINT/BURSA W/O US: CPT | Performed by: ORTHOPAEDIC SURGERY

## 2021-12-14 PROCEDURE — 99214 OFFICE O/P EST MOD 30 MIN: CPT | Performed by: ORTHOPAEDIC SURGERY

## 2021-12-14 RX ORDER — LIDOCAINE HYDROCHLORIDE 10 MG/ML
40 INJECTION, SOLUTION INFILTRATION; PERINEURAL ONCE
Status: COMPLETED | OUTPATIENT
Start: 2021-12-14 | End: 2021-12-14

## 2021-12-14 RX ORDER — TRIAMCINOLONE ACETONIDE 40 MG/ML
40 INJECTION, SUSPENSION INTRA-ARTICULAR; INTRAMUSCULAR ONCE
Status: COMPLETED | OUTPATIENT
Start: 2021-12-14 | End: 2021-12-14

## 2021-12-14 RX ADMIN — LIDOCAINE HYDROCHLORIDE 40 MG: 10 INJECTION, SOLUTION INFILTRATION; PERINEURAL at 09:50

## 2021-12-14 RX ADMIN — TRIAMCINOLONE ACETONIDE 40 MG: 40 INJECTION, SUSPENSION INTRA-ARTICULAR; INTRAMUSCULAR at 09:49

## 2021-12-14 RX ADMIN — LIDOCAINE HYDROCHLORIDE 40 MG: 10 INJECTION, SOLUTION INFILTRATION; PERINEURAL at 09:49

## 2021-12-14 RX ADMIN — TRIAMCINOLONE ACETONIDE 40 MG: 40 INJECTION, SUSPENSION INTRA-ARTICULAR; INTRAMUSCULAR at 09:50

## 2021-12-14 NOTE — PROGRESS NOTES
CHIEF COMPLAINT:   1-Bilateral knee pain/ osteoarthritis. 2-Left distal radius 3 parts intra-articular displaced fracture, status post ORIF, 1/4/2021. HISTORY:  Ms. Berkley Hughes 54 y.o.  female presents today for follow up visit for evaluation of bilateral knee pain which started to worsen gradually over the past few years. She had a cortisone injection on 9/1/2021 and had good improvement until the past couple weeks. She is complaining of achy pain. Pain is increase with standing and walking and decrease with rest. Pain is sharp early in the morning with first few steps, dull achy pain by the end of the day. Alleviating factors: rest.  Rates pain 5/10 VAS in her knees. Pain is much worse with exercise. No radiation and no numbness and tingling sensation. She has not had any treatment for this problem. No other complaint. No h/o trauma or gout. She is also here for for postop visit left wrist and states her pain is a 1/10 VAS. Pain does not occur every day and is very intermittent. Pain is worse with increased use of the left wrist and better with rest and massage. She has been working on range of motion on her own with improvement. No numbness or tingling sensation. No fever or Chills. Denies smoking.     Past Medical History:   Diagnosis Date    Anxiety and depression     Aortic valve disease 2016    Arthritis     COPD (chronic obstructive pulmonary disease) ([facility] [address]     COVID-19 06/30/2021    History of blood transfusion     Pacemaker     Medtronic       Past Surgical History:   Procedure Laterality Date    BACK SURGERY      rods/screw L4,5,6/ DJD    CARDIAC SURGERY  2017    aortic valve replacement and hole in heart    FOREARM SURGERY Left 1/4/2021    OPEN REDUCTION INTERNAL FIXATION LEFT DISTAL RADIUS performed by Clay Kirkpatrick MD at [facility]      screws and pins    PACEMAKER PLACEMENT  2017    medtronic model # A2DR01    SINUS SURGERY         Social History Socioeconomic History    Marital status:      Spouse name: Not on file    Number of children: Not on file    Years of education: Not on file    Highest education level: Not on file   Occupational History    Not on file   Tobacco Use    Smoking status: Never Smoker    Smokeless tobacco: Never Used   Vaping Use    Vaping Use: Never used   Substance and Sexual Activity    Alcohol use: Yes     Comment: weekly couple beers    Drug use: Never    Sexual activity: Yes     Partners: Male   Other Topics Concern    Not on file   Social History Narrative    Not on file     Social Determinants of Health     Financial Resource Strain:     Difficulty of Paying Living Expenses: Not on file   Food Insecurity:     Worried About Running Out of Food in the Last Year: Not on file    Shola of Food in the Last Year: Not on file   Transportation Needs:     Lack of Transportation (Medical): Not on file    Lack of Transportation (Non-Medical):  Not on file   Physical Activity:     Days of Exercise per Week: Not on file    Minutes of Exercise per Session: Not on file   Stress:     Feeling of Stress : Not on file   Social Connections:     Frequency of Communication with Friends and Family: Not on file    Frequency of Social Gatherings with Friends and Family: Not on file    Attends Judaism Services: Not on file    Active Member of [address] [place] [address] [employer]" or Organizations: Not on file    Attends Club or Organization Meetings: Not on file    Marital Status: Not on file   Intimate Partner Violence:     Fear of Current or Ex-Partner: Not on file    Emotionally Abused: Not on file    Physically Abused: Not on file    Sexually Abused: Not on file   Housing Stability:     Unable to Pay for Housing in the Last Year: Not on file    Number of [place] in the Last Year: Not on file    Unstable Housing in the Last Year: Not on file       Family History   Problem Relation Age of Onset    No Known Problems Mother    Mary Kate Cruz Heart Attack Father        Current Outpatient Medications on File Prior to Visit   Medication Sig Dispense Refill    benzonatate (TESSALON PERLES) 100 MG capsule Take 1 capsule by mouth 2 times daily as needed for Cough 14 capsule 0    estradiol (ESTRACE) 0.1 MG/GM vaginal cream Place 0.1 g vaginally every 7 days      tiotropium (SPIRIVA RESPIMAT) 2.5 MCG/ACT AERS inhaler Inhale 1 puff into the lungs 2 times daily      traZODone (DESYREL) 50 MG tablet       aspirin 81 MG chewable tablet Take 1 tablet by mouth daily 30 tablet 0    Biotin 5 MG TBDP Take 1 tablet by mouth daily      Cholecalciferol 50 MCG (2000 UT) CAPS 2,000 Units daily       oxyCODONE-acetaminophen (PERCOCET) 7.5-325 MG per tablet Take 1 tablet by mouth every 4 hours as needed for Pain .  citalopram (CELEXA) 10 MG tablet Take 10 mg by mouth daily      Multiple Vitamins-Minerals (THERAPEUTIC MULTIVITAMIN-MINERALS) tablet Take 1 tablet by mouth daily       Current Facility-Administered Medications on File Prior to Visit   Medication Dose Route Frequency Provider Last Rate Last Admin    triamcinolone acetonide (KENALOG-40) injection 40 mg  40 mg Intra-artICUlar Once Felice Cha MD        lidocaine 1 % injection 4 mL  4 mL Intra-artICUlar Once Felice Cha MD        triamcinolone acetonide (KENALOG-40) injection 40 mg  40 mg Intra-artICUlar Once Felice Cha MD        lidocaine 1 % injection 4 mL  4 mL Intra-artICUlar Once Felice Cha MD           Pertinent items are noted in HPI  Review of systems reviewed from Patient History Form dated on 1/12/2021 and available in the patient's chart under the Media tab. No change. PHYSICAL EXAMINATION:  Ms. Jasson Jenkins is a very pleasant 54 y.o.  female who presents today in no acute distress, awake, alert, and oriented. She is well dressed, nourished and  groomed. Patient with normal affect.   Height is  5' 6\" (1.676 m), weight is 145 lb (65.8 kg), Body mass index is 23.4 kg/m². Resting respiratory rate is 16. Examination of the gait, showed that the patient walks heel-toe with a non-antalgic gait and no limp. Examination of both knees showing full ROM, bilateral mild crepitus, tenderness on medial joint line, stable to varus and valgus stress. She has intact sensation and good pedal pulses. She has good strength in 2 planes, and has mild tenderness on deep palpation over the medial joint line. Knee reflex 1+ bilaterally. On examination of the left wrist, the incision is completely healed . No signs of any erythema or drainage. She  has no pain with the active or passive range of motion of the left wrist, full ROM. She  has intact sensation, distally, and she  is neurovascularly intact. IMAGING:  Xray 3 views of the bilateral knee was obtained 5/12/2021 in the office and reviewed. These demonstrate mild degenerative changes with narrowing of the joint space in the patellar joint space compartment, subchondral sclerosis, and marginal osteophytosis. Xray, 3 views left wrist taken 9/1/2021 in the office showed anatomic alignment of left distal radius, plate and screws in good position, no loosening. IMPRESSION:   1-Bilateral knee DJD. 2-Left distal radius ORIF and doing very well. PLAN: For the knees: I discussed with the patient the treatment options including both surgical and non-surgical treatment. We recommended Quad exercises and stretching of the calf and hamstrings which was taught to the patient today. She will take NSAIDS as needed. I believe she will benefit from cortisone injection bilateral knee, and she agreed to have. PROCEDURE:    With the patient's permission, and under sterile condition, the bilateral knee was prepared and draped with alcohol and injected with a mixture of 1 mL Kenalog 40mg and 4 mL of 1% lidocaine through the medial parapatellar port area. The patient tolerated the procedure well.    A band-aid was applied

## 2022-03-01 ENCOUNTER — TELEPHONE (OUTPATIENT)
Dept: ORTHOPEDIC SURGERY | Age: 57
End: 2022-03-01

## 2022-03-01 ENCOUNTER — OFFICE VISIT (OUTPATIENT)
Dept: ORTHOPEDIC SURGERY | Age: 57
End: 2022-03-01
Payer: MEDICARE

## 2022-03-01 VITALS — WEIGHT: 145 LBS | BODY MASS INDEX: 23.3 KG/M2 | HEIGHT: 66 IN

## 2022-03-01 DIAGNOSIS — M17.11 PRIMARY OSTEOARTHRITIS OF RIGHT KNEE: ICD-10-CM

## 2022-03-01 DIAGNOSIS — M17.12 PRIMARY OSTEOARTHRITIS OF LEFT KNEE: Primary | ICD-10-CM

## 2022-03-01 PROCEDURE — 20610 DRAIN/INJ JOINT/BURSA W/O US: CPT | Performed by: NURSE PRACTITIONER

## 2022-03-01 PROCEDURE — 99213 OFFICE O/P EST LOW 20 MIN: CPT | Performed by: NURSE PRACTITIONER

## 2022-03-01 RX ORDER — TRIAMCINOLONE ACETONIDE 40 MG/ML
40 INJECTION, SUSPENSION INTRA-ARTICULAR; INTRAMUSCULAR ONCE
Status: COMPLETED | OUTPATIENT
Start: 2022-03-01 | End: 2022-03-01

## 2022-03-01 RX ORDER — LIDOCAINE HYDROCHLORIDE 10 MG/ML
4 INJECTION, SOLUTION INFILTRATION; PERINEURAL ONCE
Status: COMPLETED | OUTPATIENT
Start: 2022-03-01 | End: 2022-03-01

## 2022-03-01 RX ADMIN — LIDOCAINE HYDROCHLORIDE 4 ML: 10 INJECTION, SOLUTION INFILTRATION; PERINEURAL at 11:26

## 2022-03-01 RX ADMIN — TRIAMCINOLONE ACETONIDE 40 MG: 40 INJECTION, SUSPENSION INTRA-ARTICULAR; INTRAMUSCULAR at 11:27

## 2022-03-01 RX ADMIN — LIDOCAINE HYDROCHLORIDE 4 ML: 10 INJECTION, SOLUTION INFILTRATION; PERINEURAL at 11:27

## 2022-03-01 RX ADMIN — TRIAMCINOLONE ACETONIDE 40 MG: 40 INJECTION, SUSPENSION INTRA-ARTICULAR; INTRAMUSCULAR at 11:28

## 2022-03-01 NOTE — PROGRESS NOTES
Not on file    Number of children: Not on file    Years of education: Not on file    Highest education level: Not on file   Occupational History    Not on file   Tobacco Use    Smoking status: Never Smoker    Smokeless tobacco: Never Used   Vaping Use    Vaping Use: Never used   Substance and Sexual Activity    Alcohol use: Yes     Comment: weekly couple beers    Drug use: Never    Sexual activity: Yes     Partners: Male   Other Topics Concern    Not on file   Social History Narrative    Not on file     Social Determinants of Health     Financial Resource Strain:     Difficulty of Paying Living Expenses: Not on file   Food Insecurity:     Worried About Running Out of Food in the Last Year: Not on file    Shola of Food in the Last Year: Not on file   Transportation Needs:     Lack of Transportation (Medical): Not on file    Lack of Transportation (Non-Medical):  Not on file   Physical Activity:     Days of Exercise per Week: Not on file    Minutes of Exercise per Session: Not on file   Stress:     Feeling of Stress : Not on file   Social Connections:     Frequency of Communication with Friends and Family: Not on file    Frequency of Social Gatherings with Friends and Family: Not on file    Attends Latter-day Services: Not on file    Active Member of [address] [place] [address] [employer] or Organizations: Not on file    Attends Club or Organization Meetings: Not on file    Marital Status: Not on file   Intimate Partner Violence:     Fear of Current or Ex-Partner: Not on file    Emotionally Abused: Not on file    Physically Abused: Not on file    Sexually Abused: Not on file   Housing Stability:     Unable to Pay for Housing in the Last Year: Not on file    Number of [place] in the Last Year: Not on file    Unstable Housing in the Last Year: Not on file     Current Outpatient Medications   Medication Sig Dispense Refill    estradiol (ESTRACE) 0.1 MG/GM vaginal cream Place 0.1 g vaginally every 7 days      tiotropium (SPIRIVA RESPIMAT) 2.5 MCG/ACT AERS inhaler Inhale 1 puff into the lungs 2 times daily      traZODone (DESYREL) 50 MG tablet       aspirin 81 MG chewable tablet Take 1 tablet by mouth daily 30 tablet 0    Biotin 5 MG TBDP Take 1 tablet by mouth daily      Cholecalciferol 50 MCG (2000 UT) CAPS 2,000 Units daily       oxyCODONE-acetaminophen (PERCOCET) 7.5-325 MG per tablet Take 1 tablet by mouth every 4 hours as needed for Pain .  citalopram (CELEXA) 10 MG tablet Take 10 mg by mouth daily      Multiple Vitamins-Minerals (THERAPEUTIC MULTIVITAMIN-MINERALS) tablet Take 1 tablet by mouth daily       Current Facility-Administered Medications   Medication Dose Route Frequency Provider Last Rate Last Admin    triamcinolone acetonide (KENALOG-40) injection 40 mg  40 mg Intra-artICUlar Once Adina Bryant MD        lidocaine 1 % injection 4 mL  4 mL Intra-artICUlar Once Adina Bryant MD        triamcinolone acetonide (KENALOG-40) injection 40 mg  40 mg Intra-artICUlar Once Adina Bryant MD        lidocaine 1 % injection 4 mL  4 mL Intra-artICUlar Once Adina Bryant MD           Pertinent items are noted in HPI  Review of systems reviewed from Patient History Form and available in the patient's chart under the Media tab. No change noted. PHYSICAL EXAMINATION:  Ms. Jaime Bishop is a very pleasant 64 y.o.  female who presents today in no acute distress, awake, alert, and oriented. She is well dressed, nourished and  groomed. Patient with normal affect. Height is  5' 6\" (1.676 m), weight is 145 lb (65.8 kg), Body mass index is 23.4 kg/m². Resting respiratory rate is 16. Examination of the gait, showed that the patient walks heel-toe with a non-antalgic gait and no limp. Examination of both knees showing full ROM, bilateral mild crepitus, tenderness on medial joint line, stable to varus and valgus stress. She has intact sensation and good pedal pulses.  She has good strength in 2 planes, and has mild tenderness on deep palpation over the medial joint line. Knee reflex 1+ bilaterally. On examination of the left wrist, the incision is completely healed . No signs of any erythema or drainage. She  has no pain with the active or passive range of motion of the left wrist, full ROM. She  has intact sensation, distally, and she  is neurovascularly intact. IMAGING:  Xray 3 views of the bilateral knee was obtained 5/12/2021 in the office and reviewed. These demonstrate mild degenerative changes with narrowing of the joint space in the patellar joint space compartment, subchondral sclerosis, and marginal osteophytosis. Xray, 3 views left wrist taken 9/1/2021 in the office showed anatomic alignment of left distal radius, plate and screws in good position, no loosening. IMPRESSION:   1-Bilateral knee DJD. 2-Left distal radius ORIF and doing very well. PLAN: For the knees: I discussed with the patient the treatment options including both surgical and non-surgical treatment. We recommended Quad exercises and stretching of the calf and hamstrings which was taught to the patient today. She will take NSAIDS as needed. She is aware that she is early for her injection, but she is leaving for [place] and will be gone for over a month and is aware of the risk and benefits. I think she would benefit from a cortisone injection bilateral knee and she agreed to proceed. PROCEDURE:    With the patient's permission, and under sterile condition, the bilateral knee was prepared and draped with alcohol and injected with a mixture of 1 mL Kenalog 40mg and 4 mL of 1% lidocaine through the medial parapatellar port area. The patient tolerated the procedure well. A band-aid was applied and the patient was advised to ice the knee for 15-20 minutes to relieve any injection site related pain. She reported a good improvement immediatly after the injection.     She reported a good improvement immediatly after the injection. F/u in 3-4 months, PT if needed. She understands that this may need surgery if the pain did not to resolve. For the wrist: She can go back to normal activity with no restrictions. ROM and wrist exercises. She will be seen PRN. I told the patient that it is not unusual to have some achy pain and swelling for up to a year after a fracture. As this patient has demonstrated risk factors for osteoporosis, such as age and evidence of a fracture, I have referred the patient back to the primary care physician for evaluation for osteoporosis, including consideration for DEXA scanning, if this is felt to be clinically indicated. The patient is advised to contact the primary care physician to follow-up for further evaluation.      Miriam Martin, APRN - CNP

## 2022-03-01 NOTE — TELEPHONE ENCOUNTER
Appointment Request     Patient requesting earlier appointment: Yes  Appointment offered to patient: PATIENT WOULD LIKE TO BE SEEN TODAY, MORNING TIME.  [facility] TODAY  Patient Contact Number: [phone]

## 2022-03-01 NOTE — TELEPHONE ENCOUNTER
Spoke to patient, was able to offer her an appointment this morning in [facility] office with [facility].

## 2022-04-18 ENCOUNTER — TELEPHONE (OUTPATIENT)
Dept: ORTHOPEDIC SURGERY | Age: 57
End: 2022-04-18

## 2022-04-18 NOTE — TELEPHONE ENCOUNTER
Called and spoke to patient. Patient stated it hurts badly when walking on it and it feels out of place. Patient reported no injury to the knee, but she did hear a pop a couple weeks ago and the pain has worsened since then. Scheduled patient for 4/26/2022 at the [facility] at 8:15 am patient understood.

## 2022-04-18 NOTE — TELEPHONE ENCOUNTER
Patient is wanting to know if she should get another xray of left knee. States feels out of socket. Please call patient to advise.

## 2022-04-26 ENCOUNTER — OFFICE VISIT (OUTPATIENT)
Dept: ORTHOPEDIC SURGERY | Age: 57
End: 2022-04-26
Payer: MEDICARE

## 2022-04-26 DIAGNOSIS — S52.502A CLOSED FRACTURE OF DISTAL END OF LEFT RADIUS, UNSPECIFIED FRACTURE MORPHOLOGY, INITIAL ENCOUNTER: ICD-10-CM

## 2022-04-26 DIAGNOSIS — M17.12 PRIMARY OSTEOARTHRITIS OF LEFT KNEE: Primary | ICD-10-CM

## 2022-04-26 DIAGNOSIS — M17.11 PRIMARY OSTEOARTHRITIS OF RIGHT KNEE: ICD-10-CM

## 2022-04-26 PROCEDURE — 99214 OFFICE O/P EST MOD 30 MIN: CPT | Performed by: ORTHOPAEDIC SURGERY

## 2022-04-26 RX ORDER — PREDNISONE 10 MG/1
TABLET ORAL
Qty: 26 TABLET | Refills: 0 | Status: ON HOLD | OUTPATIENT
Start: 2022-04-26 | End: 2022-08-11

## 2022-04-27 NOTE — PROGRESS NOTES
CHIEF COMPLAINT:   1-Bilateral L>R knee pain/ osteoarthritis. 2-Left distal radius 3 parts intra-articular displaced fracture, status post ORIF, 1/4/2021. HISTORY:  Ms. Rizo Page 64 y.o.  female presents today for evaluation of left knee pain after she felt a pop in March 2022. She has bilateral knee pain which started to worsen gradually over the past few years. She had a cortisone injection bilateral knees on 3/1/2022 and had good improvement until the past couple weeks. She is complaining of achy pain. Pain is increase with standing and walking and decrease with rest. Pain is sharp early in the morning with first few steps, dull achy pain by the end of the day. Alleviating factors: rest.  Rates pain 6/10 VAS in her knees. Pain is much worse with exercise. No radiation and no numbness and tingling sensation. She has not had any treatment for this problem. No other complaint. No h/o trauma or gout. She is also here for for postop visit left wrist and states her pain is a 0/10 VAS. No numbness or tingling sensation. No fever or Chills. Denies smoking.     Past Medical History:   Diagnosis Date    Anxiety and depression     Aortic valve disease 2016    Arthritis     COPD [facility]chronic obstructive pulmonary disease) ([facility]     COVID-19 06/30/2021    History of blood transfusion     Pacemaker     Medtronic       Past Surgical History:   Procedure Laterality Date    BACK SURGERY      rods/screw L4,5,6/ DJD    CARDIAC SURGERY  2017    aortic valve replacement and hole in heart    FOREARM SURGERY Left 1/4/2021    OPEN REDUCTION INTERNAL FIXATION LEFT DISTAL RADIUS performed by [facility]      screws and pins    PACEMAKER PLACEMENT  2017    medtronic model # A2DR01    SINUS SURGERY         Social History     Socioeconomic History    Marital status:      Spouse name: Not on file    Number of children: Not on file    Years of education: Not on file    Highest education level: Not on file   Occupational History    Not on file   Tobacco Use    Smoking status: Never Smoker    Smokeless tobacco: Never Used   Vaping Use    Vaping Use: Never used   Substance and Sexual Activity    Alcohol use: Yes     Comment: weekly couple beers    Drug use: Never    Sexual activity: Yes     Partners: Male   Other Topics Concern    Not on file   Social History Narrative    Not on file     Social Determinants of Health     Financial Resource Strain:     Difficulty of Paying Living Expenses: Not on file   Food Insecurity:     Worried About Running Out of Food in the Last Year: Not on file    Shola of Food in the Last Year: Not on file   Transportation Needs:     Lack of Transportation (Medical): Not on file    Lack of Transportation (Non-Medical):  Not on file   Physical Activity:     Days of Exercise per Week: Not on file    Minutes of Exercise per Session: Not on file   Stress:     Feeling of Stress : Not on file   Social Connections:     Frequency of Communication with Friends and Family: Not on file    Frequency of Social Gatherings with Friends and Family: Not on file    Attends Restorationism Services: Not on file    Active Member of [address] [place] [address] or Organizations: Not on file    Attends Club or Organization Meetings: Not on file    Marital Status: Not on file   Intimate Partner Violence:     Fear of Current or Ex-Partner: Not on file    Emotionally Abused: Not on file    Physically Abused: Not on file    Sexually Abused: Not on file   Housing Stability:     Unable to Pay for Housing in the Last Year: Not on file    Number of [place] in the Last Year: Not on file    Unstable Housing in the Last Year: Not on file       Family History   Problem Relation Age of Onset    No Known Problems Mother     Heart Attack Father        Current Outpatient Medications on File Prior to Visit   Medication Sig Dispense Refill    estradiol (ESTRACE) 0.1 MG/GM vaginal cream Place 0.1 g vaginally every 7 days      tiotropium (SPIRIVA RESPIMAT) 2.5 MCG/ACT AERS inhaler Inhale 1 puff into the lungs 2 times daily      traZODone (DESYREL) 50 MG tablet       aspirin 81 MG chewable tablet Take 1 tablet by mouth daily 30 tablet 0    Biotin 5 MG TBDP Take 1 tablet by mouth daily      Cholecalciferol 50 MCG (2000 UT) CAPS 2,000 Units daily       oxyCODONE-acetaminophen (PERCOCET) 7.5-325 MG per tablet Take 1 tablet by mouth every 4 hours as needed for Pain .  citalopram (CELEXA) 10 MG tablet Take 10 mg by mouth daily      Multiple Vitamins-Minerals (THERAPEUTIC MULTIVITAMIN-MINERALS) tablet Take 1 tablet by mouth daily       Current Facility-Administered Medications on File Prior to Visit   Medication Dose Route Frequency Provider Last Rate Last Admin    triamcinolone acetonide (KENALOG-40) injection 40 mg  40 mg Intra-artICUlar Once Merlinda Goring, MD        lidocaine 1 % injection 4 mL  4 mL Intra-artICUlar Once Merlinda Goring, MD        triamcinolone acetonide (KENALOG-40) injection 40 mg  40 mg Intra-artICUlar Once Merlinda Goring, MD        lidocaine 1 % injection 4 mL  4 mL Intra-artICUlar Once Merlinda Goring, MD           Pertinent items are noted in HPI  Review of systems reviewed from Patient History Form dated on 1/12/2021 and available in the patient's chart under the Media tab. No change. PHYSICAL EXAMINATION:  Ms. Conard Spatz is a very pleasant 64 y.o.  female who presents today in no acute distress, awake, alert, and oriented. She is well dressed, nourished and  groomed. Patient with normal affect. Height is   , weight is  , There is no height or weight on file to calculate BMI. Resting respiratory rate is 16. Examination of the gait, showed that the patient walks heel-toe with a non-antalgic gait and no limp. Examination of both knees showing full ROM, bilateral mild crepitus, tenderness on medial joint line, stable to varus and valgus stress.   She has intact sensation and good pedal pulses. She has good strength in 2 planes, and has mild tenderness on deep palpation over the medial joint line. Knee reflex 1+ bilaterally. On examination of the left wrist, the incision is completely healed . No signs of any erythema or drainage. She  has no pain with the active or passive range of motion of the left wrist, full ROM. She  has intact sensation, distally, and she  is neurovascularly intact. IMAGING:  Xray 3 views of the left knee was obtained today in the office and reviewed. These demonstrate mild degenerative changes with narrowing of the joint space in the patellar joint space compartment, subchondral sclerosis, and marginal osteophytosis. Xray, 3 views left wrist taken 9/1/2021 in the office showed anatomic alignment of left distal radius, plate and screws in good position, no loosening. IMPRESSION:   1-Bilateral L>R knee DJD. 2-Left distal radius ORIF and doing very well. PLAN: For the knees:  I assured the patient that the xray is negative for acute fracture. I discussed with the patient the treatment options including both surgical and non-surgical treatment. We recommended Quad exercises and stretching of the calf and hamstrings which was taught to the patient today. She will take prednisone taper then NSAIDS as needed. F/U in 6 weeks and we may consider cortisone injection bilateral knee if indicated then. For the wrist: She can go back to normal activity with no restrictions. ROM and wrist exercises. She will be seen PRN. I told the patient that it is not unusual to have some achy pain and swelling after a fracture. As this patient has demonstrated risk factors for osteoporosis, such as age and evidence of a fracture, I have referred the patient back to the primary care physician for evaluation for osteoporosis, including consideration for DEXA scanning, if this is felt to be clinically indicated.   The patient is advised to contact the primary care physician to follow-up for further evaluation.        Rosalie Conley MD

## 2022-05-03 ENCOUNTER — OFFICE VISIT (OUTPATIENT)
Dept: ORTHOPEDIC SURGERY | Age: 57
End: 2022-05-03
Payer: MEDICARE

## 2022-05-03 VITALS — BODY MASS INDEX: 23.3 KG/M2 | WEIGHT: 145 LBS | HEIGHT: 66 IN

## 2022-05-03 DIAGNOSIS — R52 PAIN: Primary | ICD-10-CM

## 2022-05-03 DIAGNOSIS — M48.061 SPINAL STENOSIS OF LUMBAR REGION, UNSPECIFIED WHETHER NEUROGENIC CLAUDICATION PRESENT: ICD-10-CM

## 2022-05-03 PROCEDURE — 99214 OFFICE O/P EST MOD 30 MIN: CPT | Performed by: PHYSICIAN ASSISTANT

## 2022-05-03 NOTE — PROGRESS NOTES
New Patient: LUMBAR SPINE    Referring Provider:  No ref. provider found    CHIEF COMPLAINT:    Chief Complaint   Patient presents with    Back Pain     lumbar       HISTORY OF PRESENT ILLNESS:       Ms. Beth Kevin  is a pleasant 64 y.o. female here for evaluation regarding her LBP and right leg pain. She states her pain began insidiously several years ago and has a history of a anterior posterior spinal fusion from L4-S1 by Dr. Saul Strickland in 2017. Her pain has steadily increased since then. She rates her back pain 7/10, right buttock pain 8/10, right leg pain 10/10. She describes the pain as intermittent to constant. Pain is worse with prolonged sitting and lying down and improved some with standing, walking, and leaning forward especially onto a grocery cart. The leg pain radiates down the posterior aspect of her right leg to her calf and somewhat into the anterior aspect of her right thighShe has numbness and tingling in her right leg. She denies weakness of her right or left leg and denies bowel or bladder dysfunction. The pain at times disrupts her sleep. Patient has a MRI compatible pacemaker.   Pain Assessment  Location of Pain: Back  Severity of Pain: 7  Quality of Pain: Other (Comment)  Duration of Pain: Other (Comment)  Frequency of Pain: Other (Comment)]  Current/Past Treatment:   · Physical Therapy: None recently  · Chiropractic: None recently  · Injection: None recently  · Medications: Currently taking a Medrol Dosepak    Past Medical History:   Past Medical History:   Diagnosis Date    Anxiety and depression     Aortic valve disease 2016    Arthritis     [facility]) [facility]     COVID-19 06/30/2021    History of blood transfusion     Pacemaker     Medtronic        Past Surgical History:     Past Surgical History:   Procedure Laterality Date    BACK SURGERY      rods/screw L4,5,6/ DJD    CARDIAC SURGERY  2017    aortic valve replacement and hole in heart    FOREARM SURGERY Left 1/4/2021    OPEN REDUCTION INTERNAL FIXATION LEFT DISTAL RADIUS performed by Joc[facility]ascual [facility]      screws and pins    PACEMAKER PLACEMENT  2017    medtronic model # [id number]    SINUS SURGERY         Current Medications:     Current Outpatient Medications:     predniSONE (DELTASONE) 10 MG tablet, take 3 tabs PO BID x2 days, then 2 tabs PO BID x2 days, then 1 tab PO BID x2 days, then 1 tab PO daily x2 days, Disp: 26 tablet, Rfl: 0    estradiol (ESTRACE) 0.1 MG/GM vaginal cream, Place 0.1 g vaginally every 7 days, Disp: , Rfl:     tiotropium (SPIRIVA RESPIMAT) 2.5 MCG/ACT AERS inhaler, Inhale 1 puff into the lungs 2 times daily, Disp: , Rfl:     traZODone (DESYREL) 50 MG tablet, , Disp: , Rfl:     aspirin 81 MG chewable tablet, Take 1 tablet by mouth daily, Disp: 30 tablet, Rfl: 0    Biotin 5 MG TBDP, Take 1 tablet by mouth daily, Disp: , Rfl:     Cholecalciferol 50 MCG (2000 UT) CAPS, 2,000 Units daily , Disp: , Rfl:     oxyCODONE-acetaminophen (PERCOCET) 7.5-325 MG per tablet, Take 1 tablet by mouth every 4 hours as needed for Pain ., Disp: , Rfl:     citalopram (CELEXA) 10 MG tablet, Take 10 mg by mouth daily, Disp: , Rfl:     Multiple Vitamins-Minerals (THERAPEUTIC MULTIVITAMIN-MINERALS) tablet, Take 1 tablet by mouth daily, Disp: , Rfl:     Allergies:  Compazine [prochlorperazine maleate], Hydrocodone, Hydrocodone-acetaminophen, Hydrocodone-acetaminophen, Ibuprofen, Pregabalin, and Sulfamethoxazole-trimethoprim    Social History:    reports that she has never smoked. She has never used smokeless tobacco. She reports current alcohol use. She reports that she does not use drugs.     Family History:   Family History   Problem Relation Age of Onset    No Known Problems Mother     Heart Attack Father        REVIEW OF SYSTEMS: Full ROS noted & scanned   CONSTITUTIONAL: Denies unexplained weight loss, fevers, chills or fatigue  NEUROLOGICAL: Denies unsteady gait or progressive weakness  MUSCULOSKELETAL: Denies joint swelling or redness  PSYCHOLOGICAL: Patient has a history of anxiety and depression  SKIN: Denies skin changes, delayed healing, rash, itching   HEMATOLOGIC: Denies easy bleeding or bruising  ENDOCRINE: Denies excessive thirst, urination, heat/cold  RESPIRATORY: Denies current dyspnea, cough  GI: Denies nausea, vomiting, diarrhea   : Denies bowel or bladder issues      PHYSICAL EXAM:    Vitals: Height 5' 5.98\" (1.676 m), weight 145 lb (65.8 kg), not currently breastfeeding. GENERAL EXAM:  · General Apparence: Patient is adequately groomed with no evidence of malnutrition. · Orientation: The patient is oriented to time, place and person. · Mood & Affect:The patient's mood and affect are appropriate. · Vascular: Examination reveals no swelling tenderness in upper or lower extremities. Good capillary refill. · Lymphatic: The lymphatic examination bilaterally reveals all areas to be without enlargement or induration  · Sensation: Sensation is intact without deficit  · Coordination/Balance: Good coordination. Tandem walking normal.     LUMBAR/SACRAL EXAMINATION:  · Inspection: Local inspection shows no step-off or bruising. Lumbar alignment is normal.  Sagittal and Coronal balance is neutral.      · Palpation:   No evidence of tenderness at the midline. No tenderness bilaterally at the paraspinal or trochanters. There is no step-off or paraspinal spasm. · Range of Motion: Lumbar flexion, extension and rotation are mildly limited due to pain. · Strength:   Strength testing is 5/5 in all muscle groups tested. · Special Tests:   Straight leg raise and crossed SLR negative. Leg length and pelvis level. · Skin: There are no rashes, ulcerations or lesions. · Reflexes: Reflexes are symmetrically 2+ at the patellar and ankle tendons. Clonus absent bilaterally at the feet.   · Gait & station: normal, patient ambulates without assistance    · Additional Examinations:   · RIGHT LOWER EXTREMITY: Inspection/examination of the right lower extremity does not show any tenderness, deformity or injury. Range of motion is unremarkable. There is no gross instability. There are no rashes, ulcerations or lesions. Strength and tone are normal.  · LEFT LOWER EXTREMITY:  Inspection/examination of the left lower extremity does not show any tenderness, deformity or injury. Range of motion is unremarkable. There is no gross instability. There are no rashes, ulcerations or lesions. Strength and tone are normal.    Diagnostic Testing:    X-rays: 2 views of the lumbar spine include AP and lateral were obtained today in the office and independently reviewed with the patient which shows spinal fusion from L4-S1 with instrumentation both anteriorly and posteriorly. There is disc base narrowing of L3-4 disc space as compared to the x-rays that were obtained in 2019. Impression:   Status post anterior posterior spinal fusion from L4-S1 now 5 years postop  Degenerative disc disease L3-4  Probable foraminal stenosis L3-4    1. Pain        Plan:      · We discussed the diagnosis and treatment options including observation, additional oral steroids, physical therapy, epidural injections and additional imaging. She wishes to proceed with lumbar MRI. · Follow up -after the MRI to review the results and to discuss treatment options. Old records were reviewed.     Cr Caputo PA-C  Board certified by the [facility] [address] [facility]

## 2022-05-10 ENCOUNTER — TELEPHONE (OUTPATIENT)
Dept: ORTHOPEDIC SURGERY | Age: 57
End: 2022-05-10

## 2022-05-10 NOTE — TELEPHONE ENCOUNTER
The patient is calling to let Batsheva Lynn know that she is able to get a MRI and has received a letter about it. She is wanting to know if it is ok to go ahead and set her appt up? Please call.

## 2022-05-11 ENCOUNTER — TELEPHONE (OUTPATIENT)
Dept: ORTHOPEDIC SURGERY | Age: 57
End: 2022-05-11

## 2022-05-11 NOTE — TELEPHONE ENCOUNTER
General Question     Subject: Patient needs a referral to the hospital for her MRI. [facility] can't do it because she needs to be monitored since she has a pace maker.     Patient: Elise Trivedi   Contact Number: [phone]

## 2022-05-11 NOTE — TELEPHONE ENCOUNTER
Left her a voicemail about needing to know which hospital she would like her MRI at so precert could be changed

## 2022-05-12 ENCOUNTER — TELEPHONE (OUTPATIENT)
Dept: ORTHOPEDIC SURGERY | Age: 57
End: 2022-05-12

## 2022-05-12 DIAGNOSIS — M48.061 SPINAL STENOSIS OF LUMBAR REGION, UNSPECIFIED WHETHER NEUROGENIC CLAUDICATION PRESENT: Primary | ICD-10-CM

## 2022-05-12 NOTE — TELEPHONE ENCOUNTER
Patient called and left a message she would like to go to [facility] for her MRI.

## 2022-05-24 ENCOUNTER — HOSPITAL ENCOUNTER (OUTPATIENT)
Age: 57
Discharge: HOME | End: 2022-05-24
Payer: MEDICARE

## 2022-05-24 ENCOUNTER — HOSPITAL ENCOUNTER (OUTPATIENT)
Dept: MRI IMAGING | Age: 57
Discharge: HOME | End: 2022-05-24
Payer: MEDICARE

## 2022-05-24 DIAGNOSIS — M48.061 SPINAL STENOSIS OF LUMBAR REGION, UNSPECIFIED WHETHER NEUROGENIC CLAUDICATION PRESENT: ICD-10-CM

## 2022-05-24 LAB
BUN BLDV-MCNC: 14 MG/DL (ref 7–20)
CREAT SERPL-MCNC: 0.6 MG/DL (ref 0.6–1.1)
GFR AFRICAN AMERICAN: >60
GFR NON-AFRICAN AMERICAN: >60

## 2022-05-24 PROCEDURE — 82565 ASSAY OF CREATININE: CPT

## 2022-05-24 PROCEDURE — A9579 GAD-BASE MR CONTRAST NOS,1ML: HCPCS | Performed by: PHYSICIAN ASSISTANT

## 2022-05-24 PROCEDURE — 36415 COLL VENOUS BLD VENIPUNCTURE: CPT

## 2022-05-24 PROCEDURE — 84520 ASSAY OF UREA NITROGEN: CPT

## 2022-05-24 PROCEDURE — 6360000004 HC RX CONTRAST MEDICATION: Performed by: PHYSICIAN ASSISTANT

## 2022-05-24 PROCEDURE — 72158 MRI LUMBAR SPINE W/O & W/DYE: CPT

## 2022-05-24 RX ADMIN — GADOTERIDOL 14 ML: 279.3 INJECTION, SOLUTION INTRAVENOUS at 15:35

## 2022-06-06 ENCOUNTER — TELEPHONE (OUTPATIENT)
Dept: ORTHOPEDIC SURGERY | Age: 57
End: 2022-06-06

## 2022-06-06 ENCOUNTER — OFFICE VISIT (OUTPATIENT)
Dept: ORTHOPEDIC SURGERY | Age: 57
End: 2022-06-06
Payer: MEDICARE

## 2022-06-06 VITALS — WEIGHT: 150 LBS | BODY MASS INDEX: 24.11 KG/M2 | HEIGHT: 66 IN

## 2022-06-06 DIAGNOSIS — M48.062 SPINAL STENOSIS OF LUMBAR REGION WITH NEUROGENIC CLAUDICATION: Primary | ICD-10-CM

## 2022-06-06 PROCEDURE — 99213 OFFICE O/P EST LOW 20 MIN: CPT | Performed by: PHYSICIAN ASSISTANT

## 2022-06-06 NOTE — TELEPHONE ENCOUNTER
Patient is is wanting to know will Dr Gilda Dubon be giving her injections for her knees tomorrow? She doesn't want to come that far and not get them. Please call.

## 2022-06-06 NOTE — PROGRESS NOTES
New Patient: LUMBAR SPINE    Referring Provider:  No ref. provider found    CHIEF COMPLAINT:    Chief Complaint   Patient presents with    Back Pain     TR lumbar mri       HISTORY OF PRESENT ILLNESS:       Ms. Vítcor Palacios  is a pleasant 64 y.o. female here for follow-up evaluation regarding her LBP and right leg pain and to review her lumbar MRI. She states her pain began insidiously several years ago and has a history of a anterior posterior spinal fusion from L4-S1 by Dr. Vickie Bedoya in 2017. Her pain has steadily increased since then. She rates her back pain 7/10, right buttock pain 8/10, right leg pain 10/10. She describes the pain as intermittent to constant. Pain is worse with prolonged sitting and lying down and improved some with standing, walking, and leaning forward especially onto a grocery cart. The leg pain radiates down the posterior aspect of her right leg to her calf and somewhat into the anterior aspect of her right thighShe has numbness and tingling in her right leg. She denies weakness of her right or left leg and denies bowel or bladder dysfunction. The pain at times disrupts her sleep. Patient has a MRI compatible pacemaker.   Pain Assessment  Location of Pain: Back  Location Modifiers: Medial  Severity of Pain: 7  Quality of Pain: Dull,Aching  Duration of Pain: Persistent  Frequency of Pain: Constant  Aggravating Factors: Walking,Standing  Limiting Behavior: Yes  Relieving Factors: Rest  Result of Injury: No  Work-Related Injury: No  Are there other pain locations you wish to document?: No]  Current/Past Treatment:   · Physical Therapy: None recently  · Chiropractic: None recently  · Injection: None recently  · Medications: Currently taking a Medrol Dosepak    Past Medical History:   Past Medical History:   Diagnosis Date    Anxiety and depression     Aortic valve disease 2016    Arthritis     COPD (chronic obstructive pulmonary disease) ([facility] [address]     COVID-19 06/30/2021    History of blood transfusion     Pacemaker     Medtronic        Past Surgical History:     Past Surgical History:   Procedure Laterality Date    BACK SURGERY      rods/screw L4,5,6/ DJD    CARDIAC SURGERY  2017    aortic valve replacement and hole in heart    FOREARM SURGERY Left 1/4/2021    OPEN REDUCTION INTERNAL FIXATION LEFT DISTAL RADIUS performed by Samuel Nguyen MD [facility]      screws and pins    PACEMAKER PLACEMENT  2017    medtronic model # [id number]    SINUS SURGERY         Current Medications:     Current Outpatient Medications:     predniSONE (DELTASONE) 10 MG tablet, take 3 tabs PO BID x2 days, then 2 tabs PO BID x2 days, then 1 tab PO BID x2 days, then 1 tab PO daily x2 days, Disp: 26 tablet, Rfl: 0    estradiol (ESTRACE) 0.1 MG/GM vaginal cream, Place 0.1 g vaginally every 7 days, Disp: , Rfl:     tiotropium (SPIRIVA RESPIMAT) 2.5 MCG/ACT AERS inhaler, Inhale 1 puff into the lungs 2 times daily, Disp: , Rfl:     traZODone (DESYREL) 50 MG tablet, , Disp: , Rfl:     aspirin 81 MG chewable tablet, Take 1 tablet by mouth daily, Disp: 30 tablet, Rfl: 0    Biotin 5 MG TBDP, Take 1 tablet by mouth daily, Disp: , Rfl:     Cholecalciferol 50 MCG (2000 UT) CAPS, 2,000 Units daily , Disp: , Rfl:     oxyCODONE-acetaminophen (PERCOCET) 7.5-325 MG per tablet, Take 1 tablet by mouth every 4 hours as needed for Pain ., Disp: , Rfl:     citalopram (CELEXA) 10 MG tablet, Take 10 mg by mouth daily, Disp: , Rfl:     Multiple Vitamins-Minerals (THERAPEUTIC MULTIVITAMIN-MINERALS) tablet, Take 1 tablet by mouth daily, Disp: , Rfl:     Allergies:  Compazine [prochlorperazine maleate], Hydrocodone, Hydrocodone-acetaminophen, Hydrocodone-acetaminophen, Ibuprofen, Pregabalin, and Sulfamethoxazole-trimethoprim    Social History:    reports that she has never smoked. She has never used smokeless tobacco. She reports current alcohol use. She reports that she does not use drugs.     Family History:   Family History Problem Relation Age of Onset    No Known Problems Mother     Heart Attack Father        REVIEW OF SYSTEMS: Full ROS noted & scanned   CONSTITUTIONAL: Denies unexplained weight loss, fevers, chills or fatigue  NEUROLOGICAL: Denies unsteady gait or progressive weakness  MUSCULOSKELETAL: Denies joint swelling or redness  PSYCHOLOGICAL: Patient has a history of anxiety and depression  SKIN: Denies skin changes, delayed healing, rash, itching   HEMATOLOGIC: Denies easy bleeding or bruising  ENDOCRINE: Denies excessive thirst, urination, heat/cold  RESPIRATORY: Denies current dyspnea, cough  GI: Denies nausea, vomiting, diarrhea   : Denies bowel or bladder issues      PHYSICAL EXAM:    Vitals: Height 5' 6\" (1.676 m), weight 150 lb (68 kg), not currently breastfeeding. GENERAL EXAM:  · General Apparence: Patient is adequately groomed with no evidence of malnutrition. · Orientation: The patient is oriented to time, place and person. · Mood & Affect:The patient's mood and affect are appropriate. · Vascular: Examination reveals no swelling tenderness in upper or lower extremities. Good capillary refill. · Lymphatic: The lymphatic examination bilaterally reveals all areas to be without enlargement or induration  · Sensation: Sensation is intact without deficit  · Coordination/Balance: Good coordination. Tandem walking normal.     LUMBAR/SACRAL EXAMINATION:  · Inspection: Local inspection shows no step-off or bruising. Lumbar alignment is normal.  Sagittal and Coronal balance is neutral.      · Palpation:   No evidence of tenderness at the midline. No tenderness bilaterally at the paraspinal or trochanters. There is no step-off or paraspinal spasm. · Range of Motion: Lumbar flexion, extension and rotation are mildly limited due to pain. · Strength:   Strength testing is 5/5 in all muscle groups tested. · Special Tests:   Straight leg raise and crossed SLR negative. Leg length and pelvis level.    · Skin: There are no rashes, ulcerations or lesions. · Reflexes: Reflexes are symmetrically 2+ at the patellar and ankle tendons. Clonus absent bilaterally at the feet. · Gait & station: normal, patient ambulates without assistance    · Additional Examinations:   · RIGHT LOWER EXTREMITY: Inspection/examination of the right lower extremity does not show any tenderness, deformity or injury. Range of motion is unremarkable. There is no gross instability. There are no rashes, ulcerations or lesions. Strength and tone are normal.  · LEFT LOWER EXTREMITY:  Inspection/examination of the left lower extremity does not show any tenderness, deformity or injury. Range of motion is unremarkable. There is no gross instability. There are no rashes, ulcerations or lesions. Strength and tone are normal.    Diagnostic Testing:    MRI of the lumbar spine that was obtained on 5/24/2022 was reviewed with the patient which shows  Impression   At L3-L4, grade 1 anterolisthesis, mild to moderate canal stenosis and mild   bilateral neural foraminal narrowing       At L4-L5 and L5-S1, changes related to discectomy and instrumented disc space   fusion and posterior fusion and bilateral laminectomy defects. Impression:   Status post anterior posterior spinal fusion from L4-S1 now 5 years postop  Degenerative disc disease L3-4  Spinal stenosis L3-4    No diagnosis found. Plan:      We discussed the diagnosis and treatment options including observation, additional oral steroids, physical therapy, epidural injections and additional imaging. She wishes to proceed with referral to Dr. Chelsea Xiong for an L3-4 epidural steroid injection. Follow up -after spinal injections for repeat clinical examination    Old records were reviewed.     Rohan Ott PA-C  Board certified by the [facility]

## 2022-06-07 ENCOUNTER — OFFICE VISIT (OUTPATIENT)
Dept: ORTHOPEDIC SURGERY | Age: 57
End: 2022-06-07
Payer: MEDICARE

## 2022-06-07 DIAGNOSIS — M17.12 PRIMARY OSTEOARTHRITIS OF LEFT KNEE: Primary | ICD-10-CM

## 2022-06-07 DIAGNOSIS — S52.502A CLOSED FRACTURE OF DISTAL END OF LEFT RADIUS, UNSPECIFIED FRACTURE MORPHOLOGY, INITIAL ENCOUNTER: ICD-10-CM

## 2022-06-07 DIAGNOSIS — M19.041 ARTHRITIS OF FINGER OF RIGHT HAND: ICD-10-CM

## 2022-06-07 DIAGNOSIS — M17.11 PRIMARY OSTEOARTHRITIS OF RIGHT KNEE: ICD-10-CM

## 2022-06-07 PROCEDURE — 20610 DRAIN/INJ JOINT/BURSA W/O US: CPT | Performed by: ORTHOPAEDIC SURGERY

## 2022-06-07 PROCEDURE — 99214 OFFICE O/P EST MOD 30 MIN: CPT | Performed by: ORTHOPAEDIC SURGERY

## 2022-06-07 RX ORDER — TRIAMCINOLONE ACETONIDE 40 MG/ML
40 INJECTION, SUSPENSION INTRA-ARTICULAR; INTRAMUSCULAR ONCE
Status: COMPLETED | OUTPATIENT
Start: 2022-06-07 | End: 2022-06-07

## 2022-06-07 RX ORDER — LIDOCAINE HYDROCHLORIDE 10 MG/ML
40 INJECTION, SOLUTION INFILTRATION; PERINEURAL ONCE
Status: COMPLETED | OUTPATIENT
Start: 2022-06-07 | End: 2022-06-07

## 2022-06-07 RX ADMIN — TRIAMCINOLONE ACETONIDE 40 MG: 40 INJECTION, SUSPENSION INTRA-ARTICULAR; INTRAMUSCULAR at 08:53

## 2022-06-07 RX ADMIN — LIDOCAINE HYDROCHLORIDE 40 MG: 10 INJECTION, SOLUTION INFILTRATION; PERINEURAL at 08:52

## 2022-06-11 PROBLEM — M19.041 ARTHRITIS OF FINGER OF RIGHT HAND: Status: ACTIVE | Noted: 2022-06-11

## 2022-06-11 NOTE — PROGRESS NOTES
CHIEF COMPLAINT:   1-Bilateral L>R knee pain/ osteoarthritis. 2-Left distal radius 3 parts intra-articular displaced fracture, status post ORIF, 1/4/2021. 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 HISTORY:  Ms. Cynthia Handley 64 y.o.  female LHD, presents today for f/u evaluation of bilateral knee pain after she felt a pop in March 2022. She has bilateral knee pain which started to worsen gradually over the past few years. She had a cortisone injection bilateral knees on 3/1/2022 and had good improvement until the past couple weeks. She is complaining of achy pain. Pain is increase with standing and walking and decrease with rest. Pain is sharp early in the morning with first few steps, dull achy pain by the end of the day. Alleviating factors: rest.  Rates pain 6/10 VAS in her knees. Pain is much worse with exercise. No radiation and no numbness and tingling sensation. She has not had any treatment for this problem. No other complaint. No h/o trauma or gout. She is also here for for postop visit left wrist and states her pain is a 0/10 VAS. No numbness or tingling sensation. No fever or Chills. Denies smoking. She is also c/o right hand index finger pain.     Past Medical History:   Diagnosis Date    Anxiety and depression     Aortic valve disease 2016    Arthritis     COPD (chronic obstructive pulmonary disease) ([facility] [address]     COVID-19 06/30/2021    History of blood transfusion     Pacemaker     Medtronic       Past Surgical History:   Procedure Laterality Date    BACK SURGERY      rods/screw L4,5,6/ DJD    CARDIAC SURGERY  2017    aortic valve replacement and hole in heart    FOREARM SURGERY Left 1/4/2021    OPEN REDUCTION INTERNAL FIXATION LEFT DISTAL RADIUS performed by Cezar Patel MD at [facility]      screws and pins    PACEMAKER PLACEMENT  2017    medtronic model # [id number]    SINUS SURGERY         Social History     Socioeconomic History    Marital status:      Spouse name: Not on file    Number of children: Not on file    Years of education: Not on file    Highest education level: Not on file   Occupational History    Not on file   Tobacco Use    Smoking status: Never Smoker    Smokeless tobacco: Never Used   Vaping Use    Vaping Use: Never used   Substance and Sexual Activity    Alcohol use: Yes     Comment: weekly couple beers    Drug use: Never    Sexual activity: Yes     Partners: Male   Other Topics Concern    Not on file   Social History Narrative    Not on file     Social Determinants of Health     Financial Resource Strain:     Difficulty of Paying Living Expenses: Not on file   Food Insecurity:     Worried About Running Out of Food in the Last Year: Not on file    Shola of Food in the Last Year: Not on file   Transportation Needs:     Lack of Transportation (Medical): Not on file    Lack of Transportation (Non-Medical):  Not on file   Physical Activity:     Days of Exercise per Week: Not on file    Minutes of Exercise per Session: Not on file   Stress:     Feeling of Stress : Not on file   Social Connections:     Frequency of Communication with Friends and Family: Not on file    Frequency of Social Gatherings with Friends and Family: Not on file    Attends Christian Services: Not on file    Active Member of [address] [place] [address] [employer] or Organizations: Not on file    Attends Club or Organization Meetings: Not on file    Marital Status: Not on file   Intimate Partner Violence:     Fear of Current or Ex-Partner: Not on file    Emotionally Abused: Not on file    Physically Abused: Not on file    Sexually Abused: Not on file   Housing Stability:     Unable to Pay for Housing in the Last Year: Not on file    Number of [place] in the Last Year: Not on file    Unstable Housing in the Last Year: Not on file       Family History   Problem Relation Age of Onset    No Known Problems Mother     Heart Attack Father        Current Outpatient Medications on File Prior to Visit   Medication Sig Dispense Refill    predniSONE (DELTASONE) 10 MG tablet take 3 tabs PO BID x2 days, then 2 tabs PO BID x2 days, then 1 tab PO BID x2 days, then 1 tab PO daily x2 days 26 tablet 0    estradiol (ESTRACE) 0.1 MG/GM vaginal cream Place 0.1 g vaginally every 7 days      tiotropium (SPIRIVA RESPIMAT) 2.5 MCG/ACT AERS inhaler Inhale 1 puff into the lungs 2 times daily      traZODone (DESYREL) 50 MG tablet       aspirin 81 MG chewable tablet Take 1 tablet by mouth daily 30 tablet 0    Biotin 5 MG TBDP Take 1 tablet by mouth daily      Cholecalciferol 50 MCG (2000 UT) CAPS 2,000 Units daily       oxyCODONE-acetaminophen (PERCOCET) 7.5-325 MG per tablet Take 1 tablet by mouth every 4 hours as needed for Pain .  citalopram (CELEXA) 10 MG tablet Take 10 mg by mouth daily      Multiple Vitamins-Minerals (THERAPEUTIC MULTIVITAMIN-MINERALS) tablet Take 1 tablet by mouth daily       Current Facility-Administered Medications on File Prior to Visit   Medication Dose Route Frequency Provider Last Rate Last Admin    triamcinolone acetonide (KENALOG-40) injection 40 mg  40 mg Intra-artICUlar Once Garrett Bowie MD        lidocaine 1 % injection 4 mL  4 mL Intra-artICUlar Once Garrett Bowie MD        triamcinolone acetonide (KENALOG-40) injection 40 mg  40 mg Intra-artICUlar Once Garrett Bowie MD        lidocaine 1 % injection 4 mL  4 mL Intra-artICUlar Once Garrett Bowie MD           Pertinent items are noted in HPI  Review of systems reviewed from Patient History Form dated on 1/12/2021 and available in the patient's chart under the Media tab. No change. PHYSICAL EXAMINATION:  Ms. Alecia Álvarez is a very pleasant 64 y.o.  female who presents today in no acute distress, awake, alert, and oriented. She is well dressed, nourished and  groomed. Patient with normal affect. Height is   , weight is  , There is no height or weight on file to calculate BMI. Resting respiratory rate is 16. Examination of the gait, showed that the patient walks heel-toe with a non-antalgic gait and no limp. Examination of both knees showing full ROM, bilateral mild crepitus, tenderness on medial joint line, stable to varus and valgus stress. She has intact sensation and good pedal pulses. She has good strength in 2 planes, and has mild tenderness on deep palpation over the medial joint line. Knee reflex 1+ bilaterally. On examination of the left wrist, the incision is completely healed . No signs of any erythema or drainage. She  has no pain with the active or passive range of motion of the left wrist, full ROM. She  has intact sensation, distally, and she  is neurovascularly intact. IMAGING:  Xray 3 views of the left knee was obtained 4/26/2022 in the office and reviewed. These demonstrate mild degenerative changes with narrowing of the joint space in the patellar joint space compartment, subchondral sclerosis, and marginal osteophytosis. Xray, 3 views left wrist taken 9/1/2021 in the office showed anatomic alignment of left distal radius, plate and screws in good position, no loosening. IMPRESSION:   1-Bilateral L>R knee DJD. 2-Left distal radius ORIF and doing very well. 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 PLAN: For the knees:  I assured the patient that the xray is negative for acute fracture. I discussed with the patient the treatment options including both surgical and non-surgical treatment. We recommended Quad exercises and stretching of the calf and hamstrings which was taught to the patient today. I believe she will benefit from cortisone injection bilateral knees, and she agreed to have.        PROCEDURE:    With the patient's permission, and under sterile condition, the bilateral knee was prepared and draped with alcohol and injected with a mixture of 1 mL Kenalog 40mg and 4 mL of 1% lidocaine through the medial

## 2022-08-11 ENCOUNTER — HOSPITAL ENCOUNTER (OUTPATIENT)
Age: 57
Setting detail: OUTPATIENT SURGERY
Discharge: HOME | End: 2022-08-11
Attending: PAIN MEDICINE | Admitting: PAIN MEDICINE
Payer: MEDICARE

## 2022-08-11 VITALS
BODY MASS INDEX: 24.11 KG/M2 | WEIGHT: 150 LBS | HEART RATE: 72 BPM | DIASTOLIC BLOOD PRESSURE: 94 MMHG | RESPIRATION RATE: 20 BRPM | TEMPERATURE: 96.9 F | HEIGHT: 66 IN | SYSTOLIC BLOOD PRESSURE: 135 MMHG | OXYGEN SATURATION: 100 %

## 2022-08-11 PROBLEM — M54.16 LUMBAR RADICULOPATHY: Status: ACTIVE | Noted: 2022-08-11

## 2022-08-11 PROCEDURE — 2709999900 HC NON-CHARGEABLE SUPPLY: Performed by: PAIN MEDICINE

## 2022-08-11 PROCEDURE — 7100000010 HC PHASE II RECOVERY - FIRST 15 MIN: Performed by: PAIN MEDICINE

## 2022-08-11 PROCEDURE — 2500000003 HC RX 250 WO HCPCS: Performed by: PAIN MEDICINE

## 2022-08-11 PROCEDURE — 6360000002 HC RX W HCPCS: Performed by: PAIN MEDICINE

## 2022-08-11 PROCEDURE — 6360000004 HC RX CONTRAST MEDICATION: Performed by: PAIN MEDICINE

## 2022-08-11 PROCEDURE — 64483 NJX AA&/STRD TFRM EPI L/S 1: CPT | Performed by: PAIN MEDICINE

## 2022-08-11 PROCEDURE — 3600000002 HC SURGERY LEVEL 2 BASE: Performed by: PAIN MEDICINE

## 2022-08-11 RX ORDER — BETAMETHASONE SODIUM PHOSPHATE AND BETAMETHASONE ACETATE 3; 3 MG/ML; MG/ML
INJECTION, SUSPENSION INTRA-ARTICULAR; INTRALESIONAL; INTRAMUSCULAR; SOFT TISSUE
Status: DISCONTINUED
Start: 2022-08-11 | End: 2022-08-11 | Stop reason: HOSPADM

## 2022-08-11 RX ORDER — PSEUDOEPHEDRINE HCL 30 MG
200 TABLET ORAL 2 TIMES DAILY
COMMUNITY
Start: 2022-05-10

## 2022-08-11 RX ORDER — LIDOCAINE HYDROCHLORIDE 10 MG/ML
INJECTION, SOLUTION EPIDURAL; INFILTRATION; INTRACAUDAL; PERINEURAL PRN
Status: DISCONTINUED | OUTPATIENT
Start: 2022-08-11 | End: 2022-08-11 | Stop reason: ALTCHOICE

## 2022-08-11 ASSESSMENT — PAIN - FUNCTIONAL ASSESSMENT
PAIN_FUNCTIONAL_ASSESSMENT: 0-10
PAIN_FUNCTIONAL_ASSESSMENT: PREVENTS OR INTERFERES WITH MANY ACTIVE NOT PASSIVE ACTIVITIES

## 2022-08-11 ASSESSMENT — PAIN DESCRIPTION - DESCRIPTORS: DESCRIPTORS: ACHING

## 2022-08-11 NOTE — PROCEDURES
Elyssa Lawson is a 64 y.o. female patient. No diagnosis found. Past Medical History:   Diagnosis Date    Anxiety and depression     Aortic valve disease 2016    Arthritis     COPD (chronic obstructive pulmonary disease) ([address]     COVID-19 06/30/2021    History of blood transfusion     Pacemaker     Medtronic     Blood pressure (!) 135/94, pulse 72, temperature 96.9 °F (36.1 °C), temperature source Temporal, resp. rate 20, height 5' 6\" (1.676 m), weight 150 lb (68 kg), SpO2 100 %, not currently breastfeeding. Procedures    Grayson Gage MD  8/11/2022    R S1             TRANSFORAMINAL EPIDURAL INJECTION  [id number] and [id number]  with [id number],     INDICATIONS:  Lumbar Radiculitis 724.4, M54.17  OPERATIVE PROCEDURE:  Consent was signed by the patient after the risks and benefits were explained. With the patient in the prone position, the back was prepped with Pre[place] and draped. Aseptic technique was used at every stage of the procedure. Next, after sterile prep and local infiltration a _22__ -gauge _3.5__ -inch needle was placed on the _R__-sided S1 foramen. Aspiration was negative for CSF or blood . Injection of Omnipaque dye (     0.5    cc) showed appropriate spread into the epidural space and also along the nerve root. _0.5__ cc of _1__ % /Lidocaine and  9 mg of CELESTONE was injected. The needle was pulled out intact. The patient tolerated the procedure well and discharge instructions were given.     ESTIMATED BLOOD LOSS:  Less than 1 cc      Pulse Ox Monitoring was done.               ________________________________                   Mitzi Avila M.D.

## 2022-08-11 NOTE — DISCHARGE INSTRUCTIONS
[facility]                    [phone]                        Post Pain Management Injection                                          1)  PATIENT INSTRUCTIONS: - Resume Normal Diet         - Other                         2)  ACTIVITY: * No driving or operating machinery for 8 hours post procedure without sedation      and 24 hours with sedation. If you are seen driving during this time the        proper authorities will be notified. * Do not stay alone for 4-6 hours after the procedure       * Do not sign legal documents, make any major decisions, or be involved in       work decisions for the remainder of the day. - May shower or bathe          - Resume normal activity when full movement/sensation has       returned in extremities                   3)  SITE CARE: - Observe puncture site for signs of infection (redness, warmth       swelling, drainage with a foul odor, fever or increased tenderness)                4)  EXPECTED SIDE EFFECTS: * Numbness/tingling/weakness in extremities, if this lasts more       than 6 hours notify Dr. Pedro Robb        - Muscle stiffness, soreness at puncture site (soreness may       last 2-4 days)                    5)  DIABETIC PATIENTS ONLY    - Increased glucose levels in all diabetic patients who have received       a steroid injection.               - Monitor blood sugars frequently for the first 5 days following procedure. Adjust medication accordingly. 6)  TO REACH DR. [employer]: - Call [phone]                    7)  ADDITIONAL INSTRUCTIONS: - Follow-up as scheduled or call for appointment if not already done. - Patients taking Coumadin may resume taking as before the procedure.                  I have received and understand these instructions                     _____________________________        ___________________________      ________     ________ _____________________________        ___________________________      ________     ________                                      _________      _________

## 2022-08-11 NOTE — PROGRESS NOTES
Discharge  instructions reviewed. Pt and family verbalize understanding with no further questions. VSS. Pt discharged via [facility] [address] to car. Assessment unchanged.

## 2022-09-13 ENCOUNTER — APPOINTMENT (RX ONLY)
Dept: URBAN - METROPOLITAN AREA CLINIC 170 | Facility: CLINIC | Age: 57
Setting detail: DERMATOLOGY
End: 2022-09-13

## 2022-09-13 ENCOUNTER — OFFICE VISIT (OUTPATIENT)
Dept: ORTHOPEDIC SURGERY | Age: 57
End: 2022-09-13
Payer: COMMERCIAL

## 2022-09-13 VITALS — BODY MASS INDEX: 24.11 KG/M2 | WEIGHT: 150 LBS | HEIGHT: 66 IN

## 2022-09-13 DIAGNOSIS — L82.1 OTHER SEBORRHEIC KERATOSIS: ICD-10-CM | Status: STABLE

## 2022-09-13 DIAGNOSIS — M17.12 PRIMARY OSTEOARTHRITIS OF LEFT KNEE: Primary | ICD-10-CM

## 2022-09-13 DIAGNOSIS — D22 MELANOCYTIC NEVI: ICD-10-CM | Status: STABLE

## 2022-09-13 DIAGNOSIS — L57.0 ACTINIC KERATOSIS: ICD-10-CM | Status: INADEQUATELY CONTROLLED

## 2022-09-13 DIAGNOSIS — M17.11 PRIMARY OSTEOARTHRITIS OF RIGHT KNEE: ICD-10-CM

## 2022-09-13 DIAGNOSIS — D18.0 HEMANGIOMA: ICD-10-CM | Status: STABLE

## 2022-09-13 DIAGNOSIS — M19.041 ARTHRITIS OF FINGER OF RIGHT HAND: ICD-10-CM

## 2022-09-13 DIAGNOSIS — L81.4 OTHER MELANIN HYPERPIGMENTATION: ICD-10-CM | Status: STABLE

## 2022-09-13 DIAGNOSIS — S52.502A CLOSED FRACTURE OF DISTAL END OF LEFT RADIUS, UNSPECIFIED FRACTURE MORPHOLOGY, INITIAL ENCOUNTER: ICD-10-CM

## 2022-09-13 PROBLEM — D18.01 HEMANGIOMA OF SKIN AND SUBCUTANEOUS TISSUE: Status: ACTIVE | Noted: 2022-09-13

## 2022-09-13 PROBLEM — D22.5 MELANOCYTIC NEVI OF TRUNK: Status: ACTIVE | Noted: 2022-09-13

## 2022-09-13 PROCEDURE — ? FULL BODY SKIN EXAM

## 2022-09-13 PROCEDURE — ? PRESCRIPTION

## 2022-09-13 PROCEDURE — 99204 OFFICE O/P NEW MOD 45 MIN: CPT

## 2022-09-13 PROCEDURE — 99214 OFFICE O/P EST MOD 30 MIN: CPT | Performed by: ORTHOPAEDIC SURGERY

## 2022-09-13 PROCEDURE — 20610 DRAIN/INJ JOINT/BURSA W/O US: CPT | Performed by: ORTHOPAEDIC SURGERY

## 2022-09-13 PROCEDURE — ? PRESCRIPTION MEDICATION MANAGEMENT

## 2022-09-13 PROCEDURE — ? EDUCATIONAL RESOURCES PROVIDED

## 2022-09-13 PROCEDURE — ? COUNSELING

## 2022-09-13 PROCEDURE — ? SUNSCREEN RECOMMENDATIONS

## 2022-09-13 PROCEDURE — ? ADDITIONAL NOTES

## 2022-09-13 PROCEDURE — ? TREATMENT REGIMEN

## 2022-09-13 RX ORDER — TRIAMCINOLONE ACETONIDE 40 MG/ML
40 INJECTION, SUSPENSION INTRA-ARTICULAR; INTRAMUSCULAR ONCE
Status: COMPLETED | OUTPATIENT
Start: 2022-09-13 | End: 2022-09-13

## 2022-09-13 RX ORDER — FLUOROURACIL 5 MG/G
CREAM TOPICAL
Qty: 40 | Refills: 2 | Status: ERX | COMMUNITY
Start: 2022-09-13

## 2022-09-13 RX ORDER — LIDOCAINE HYDROCHLORIDE 10 MG/ML
4 INJECTION, SOLUTION INFILTRATION; PERINEURAL ONCE
Status: COMPLETED | OUTPATIENT
Start: 2022-09-13 | End: 2022-09-13

## 2022-09-13 RX ADMIN — FLUOROURACIL 1: 5 CREAM TOPICAL at 00:00

## 2022-09-13 RX ADMIN — LIDOCAINE HYDROCHLORIDE 4 ML: 10 INJECTION, SOLUTION INFILTRATION; PERINEURAL at 08:28

## 2022-09-13 RX ADMIN — TRIAMCINOLONE ACETONIDE 40 MG: 40 INJECTION, SUSPENSION INTRA-ARTICULAR; INTRAMUSCULAR at 08:29

## 2022-09-13 RX ADMIN — LIDOCAINE HYDROCHLORIDE 4 ML: 10 INJECTION, SOLUTION INFILTRATION; PERINEURAL at 08:29

## 2022-09-13 RX ADMIN — TRIAMCINOLONE ACETONIDE 40 MG: 40 INJECTION, SUSPENSION INTRA-ARTICULAR; INTRAMUSCULAR at 08:30

## 2022-09-13 ASSESSMENT — LOCATION SIMPLE DESCRIPTION DERM
LOCATION SIMPLE: RIGHT LOWER BACK
LOCATION SIMPLE: UPPER LIP
LOCATION SIMPLE: RIGHT CHEEK
LOCATION SIMPLE: LEFT CHEEK
LOCATION SIMPLE: ABDOMEN
LOCATION SIMPLE: NOSE
LOCATION SIMPLE: UPPER BACK
LOCATION SIMPLE: RIGHT SHOULDER

## 2022-09-13 ASSESSMENT — LOCATION ZONE DERM
LOCATION ZONE: FACE
LOCATION ZONE: LIP
LOCATION ZONE: ARM
LOCATION ZONE: NOSE
LOCATION ZONE: TRUNK

## 2022-09-13 ASSESSMENT — LOCATION DETAILED DESCRIPTION DERM
LOCATION DETAILED: EPIGASTRIC SKIN
LOCATION DETAILED: RIGHT ANTERIOR SHOULDER
LOCATION DETAILED: RIGHT INFERIOR CENTRAL MALAR CHEEK
LOCATION DETAILED: NASAL DORSUM
LOCATION DETAILED: LEFT INFERIOR CENTRAL MALAR CHEEK
LOCATION DETAILED: PHILTRUM
LOCATION DETAILED: RIGHT INFERIOR MEDIAL MIDBACK
LOCATION DETAILED: INFERIOR THORACIC SPINE

## 2022-09-13 NOTE — HPI: EVALUATION OF SKIN LESION(S)
What Type Of Note Output Would You Prefer (Optional)?: Bullet Format
Hpi Title: Evaluation of Skin Lesions
How Severe Are Your Spot(S)?: mild
Have Your Spot(S) Been Treated In The Past?: has not been treated
Additional History: Pt complains of lesions on her back that have been there for awhile. She thinks that they have changed in color. She declines treatment.

## 2022-09-13 NOTE — PROGRESS NOTES
CHIEF COMPLAINT:   1-Bilateral L>R knee pain/ osteoarthritis. 2-Left distal radius 3 parts intra-articular displaced fracture, status post ORIF, 1/4/2021. 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 HISTORY:  Ms. Aguilar Page 64 y.o.  female LHD, presents today for f/u evaluation of bilateral knee pain after she felt a pop in March 2022. She has bilateral knee pain which started to worsen gradually over the past few years. She had a cortisone injection bilateral knees on 6/7/2022 and had good improvement until the past couple weeks. She is complaining of achy pain. Pain is increase with standing and walking and decrease with rest. Pain is sharp early in the morning with first few steps, dull achy pain by the end of the day. Alleviating factors: rest.  Rates pain 6/10 VAS in her knees. Pain is much worse with exercise. No radiation and no numbness and tingling sensation. She has not had any treatment for this problem. No other complaint. No h/o trauma or gout. She is also here for for postop visit left wrist and states her pain is a 0/10 VAS. No numbness or tingling sensation. No fever or Chills. Denies smoking. She is also c/o right hand index finger pain.     Past Medical History:   Diagnosis Date    Anxiety and depression     Aortic valve disease 2016    Arthritis     COPD (chronic obstructive pulmonary disease) ([facility] [address]     COVID-19 06/30/2021    History of blood transfusion     Pacemaker     Medtronic       Past Surgical History:   Procedure Laterality Date    BACK SURGERY      rods/screw L4,5,6/ DJD    CARDIAC SURGERY  2017    aortic valve replacement and hole in heart    FOREARM SURGERY Left 1/4/2021    OPEN REDUCTION INTERNAL FIXATION LEFT DISTAL RADIUS performed by Chelsey Brown MD at [facility]      screws and pins    PACEMAKER PLACEMENT  2017    medtronic model # [id number]    PAIN MANAGEMENT PROCEDURE Right 8/11/2022    RIGHT SACRAL ONE TRANSFORAMINAL EPIDURAL STEROID INJECTION SITE CONFIRMED BY FLUOROSCOPY performed by Neris Salazar MD at [address] [place]    SINUS SURGERY         Social History     Socioeconomic History    Marital status:      Spouse name: Not on file    Number of children: Not on file    Years of education: Not on file    Highest education level: Not on file   Occupational History    Not on file   Tobacco Use    Smoking status: Never    Smokeless tobacco: Never   Vaping Use    Vaping Use: Never used   Substance and Sexual Activity    Alcohol use: Yes     Comment: weekly couple beers    Drug use: Never    Sexual activity: Yes     Partners: Male   Other Topics Concern    Not on file   Social History Narrative    Not on file     Social Determinants of Health     Financial Resource Strain: Not on file   Food Insecurity: Not on file   Transportation Needs: Not on file   Physical Activity: Not on file   Stress: Not on file   Social Connections: Not on file   Intimate Partner Violence: Not on file   Housing Stability: Not on file       Family History   Problem Relation Age of Onset    No Known Problems Mother     Heart Attack Father        Current Outpatient Medications on File Prior to Visit   Medication Sig Dispense Refill    docusate (COLACE, DULCOLAX) 100 MG CAPS Take 200 mg by mouth in the morning and 200 mg before bedtime. estradiol (ESTRACE) 0.1 MG/GM vaginal cream Place 0.1 g vaginally every 7 days      tiotropium (SPIRIVA RESPIMAT) 2.5 MCG/ACT AERS inhaler Inhale 1 puff into the lungs 2 times daily      traZODone (DESYREL) 50 MG tablet       Biotin 5 MG TBDP Take 1 tablet by mouth daily      Cholecalciferol 50 MCG (2000 UT) CAPS 2,000 Units daily  (Patient not taking: Reported on 8/11/2022)      oxyCODONE-acetaminophen (PERCOCET) 7.5-325 MG per tablet Take 1 tablet by mouth every 4 hours as needed for Pain .       citalopram (CELEXA) 10 MG tablet Take 10 mg by mouth daily      Multiple Vitamins-Minerals (THERAPEUTIC MULTIVITAMIN-MINERALS) tablet Take 1 tablet by mouth daily       Current Facility-Administered Medications on File Prior to Visit   Medication Dose Route Frequency Provider Last Rate Last Admin    triamcinolone acetonide (KENALOG-40) injection 40 mg  40 mg Intra-artICUlar Once Uzair Hartley MD        lidocaine 1 % injection 4 mL  4 mL Intra-artICUlar Once Uzair Hartley MD        triamcinolone acetonide (KENALOG-40) injection 40 mg  40 mg Intra-artICUlar Once Uzair Hartley MD        lidocaine 1 % injection 4 mL  4 mL Intra-artICUlar Once Uzair Hartley MD           Pertinent items are noted in HPI  Review of systems reviewed from Patient History Form dated on 1/12/2021 and available in the patient's chart under the Media tab. No change. PHYSICAL EXAMINATION:  Ms. Celi Morris is a very pleasant 64 y.o.  female who presents today in no acute distress, awake, alert, and oriented. She is well dressed, nourished and  groomed. Patient with normal affect. Height is  5' 6\" (1.676 m), weight is 150 lb (68 kg), Body mass index is 24.21 kg/m². Resting respiratory rate is 16. Examination of the gait, showed that the patient walks heel-toe with a non-antalgic gait and no limp. Examination of both knees showing full ROM, bilateral mild crepitus, tenderness on medial joint line, stable to varus and valgus stress. She has intact sensation and good pedal pulses. She has good strength in 2 planes, and has mild tenderness on deep palpation over the medial joint line. Knee reflex 1+ bilaterally. On examination of the left wrist, the incision is completely healed . No signs of any erythema or drainage. She  has no pain with the active or passive range of motion of the left wrist, full ROM. She  has intact sensation, distally, and she  is neurovascularly intact. Right hand with index DIP deformity and decrease ROM.    6/7/2022        IMAGING:  Xray 3 views of the left knee was obtained 4/26/2022 in the office and reviewed.   These demonstrate mild degenerative changes with narrowing of the joint space in the patellar joint space compartment, subchondral sclerosis, and marginal osteophytosis. Xray, 3 views left wrist taken 9/1/2021 in the office showed anatomic alignment of left distal radius, plate and screws in good position, no loosening. IMPRESSION:   1-Bilateral L>R knee DJD. 2-Left distal radius ORIF and doing very well. 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 PLAN: For the knees:  I assured the patient that the xray is negative for acute fracture. I discussed with the patient the treatment options including both surgical and non-surgical treatment. We recommended Quad exercises and stretching of the calf and hamstrings which was taught to the patient today. I believe she will benefit from cortisone injection bilateral knees, and she agreed to have. PROCEDURE:    With the patient's permission, and under sterile condition, the bilateral knee was prepared and draped with alcohol and injected with a mixture of 1 mL Kenalog 40mg and 4 mL of 1% lidocaine through the medial parapatellar port area. The patient tolerated the procedure well. A band-aid was applied and the patient was advised to ice the knee for 15-20 minutes to relieve any injection site related pain. She reported a good improvement immediatly after the injection. For the wrist: She can go back to normal activity with no restrictions. ROM and wrist exercises. She will be seen PRN. I told the patient that it is not unusual to have some achy pain and swelling after a fracture. For her right hand, recommend ROM index finger, may need surgery with DIP fusion. As this patient has demonstrated risk factors for osteoporosis, such as age and evidence of a fracture, I have referred the patient back to the primary care physician for evaluation for osteoporosis, including consideration for DEXA scanning, if this is felt to be clinically indicated. The patient is advised to contact the primary care physician to follow-up for further evaluation.        Kaleb Ag MD

## 2022-09-13 NOTE — PROCEDURE: PRESCRIPTION MEDICATION MANAGEMENT
Detail Level: Simple
Render In Strict Bullet Format?: No
Initiate Treatment: Efudex 5% cream to nose, cutaneous upper lip and medial cheeks once a day x 7 days: Sept Oct Nov

## 2022-12-13 ENCOUNTER — TELEPHONE (OUTPATIENT)
Dept: ORTHOPEDIC SURGERY | Age: 57
End: 2022-12-13

## 2022-12-13 ENCOUNTER — OFFICE VISIT (OUTPATIENT)
Dept: ORTHOPEDIC SURGERY | Age: 57
End: 2022-12-13

## 2022-12-13 DIAGNOSIS — M17.12 PRIMARY OSTEOARTHRITIS OF LEFT KNEE: ICD-10-CM

## 2022-12-13 DIAGNOSIS — M19.049 ARTHRITIS OF FINGER: Primary | ICD-10-CM

## 2022-12-13 DIAGNOSIS — M17.11 PRIMARY OSTEOARTHRITIS OF RIGHT KNEE: ICD-10-CM

## 2022-12-13 DIAGNOSIS — M19.042 ARTHRITIS OF FINGER OF LEFT HAND: Primary | ICD-10-CM

## 2022-12-13 RX ORDER — LIDOCAINE HYDROCHLORIDE 10 MG/ML
40 INJECTION, SOLUTION INFILTRATION; PERINEURAL ONCE
Status: COMPLETED | OUTPATIENT
Start: 2022-12-13 | End: 2022-12-13

## 2022-12-13 RX ORDER — TRIAMCINOLONE ACETONIDE 40 MG/ML
40 INJECTION, SUSPENSION INTRA-ARTICULAR; INTRAMUSCULAR ONCE
Status: COMPLETED | OUTPATIENT
Start: 2022-12-13 | End: 2022-12-13

## 2022-12-13 RX ADMIN — LIDOCAINE HYDROCHLORIDE 40 MG: 10 INJECTION, SOLUTION INFILTRATION; PERINEURAL at 09:49

## 2022-12-13 RX ADMIN — TRIAMCINOLONE ACETONIDE 40 MG: 40 INJECTION, SUSPENSION INTRA-ARTICULAR; INTRAMUSCULAR at 09:50

## 2022-12-13 NOTE — PROGRESS NOTES
CHIEF COMPLAINT:   1-Bilateral L>R knee pain/ osteoarthritis. 2-Left distal radius 3 parts intra-articular displaced fracture, status post ORIF, 1/4/2021. 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 HISTORY:  Ms. Lalit Echols 64 y.o.  female LHD, presents today for f/u evaluation of bilateral knee pain after she felt a pop in March 2022. She has bilateral knee pain which started to worsen gradually over the past few years. She had a cortisone injection bilateral knees on 6/7/2022 and had good improvement until the past couple weeks. She is complaining of achy pain. Pain is increase with standing and walking and decrease with rest. Pain is sharp early in the morning with first few steps, dull achy pain by the end of the day. Alleviating factors: rest.  Rates pain 6/10 VAS in her knees. Pain is much worse with exercise. No radiation and no numbness and tingling sensation. She has not had any treatment for this problem. No other complaint. No h/o trauma or gout. She is also here for for postop visit left wrist and states her pain is a 0/10 VAS. No numbness or tingling sensation. No fever or Chills. Denies smoking. She is also c/o right hand index finger pain.     Past Medical History:   Diagnosis Date    Anxiety and depression     Aortic valve disease 2016    Arthritis     COPD (chronic obstructive pulmonary disease) ([facility] [address]     COVID-19 06/30/2021    History of blood transfusion     Pacemaker     Medtronic       Past Surgical History:   Procedure Laterality Date    BACK SURGERY      rods/screw L4,5,6/ DJD    CARDIAC SURGERY  2017    aortic valve replacement and hole in heart    FOREARM SURGERY Left 1/4/2021    OPEN REDUCTION INTERNAL FIXATION LEFT DISTAL RADIUS performed by Radha Pennington MD at [address] [place] [address]      screws and pins    PACEMAKER PLACEMENT  2017    medtronic model # [id number]    PAIN MANAGEMENT PROCEDURE Right 8/11/2022    RIGHT SACRAL ONE TRANSFORAMINAL EPIDURAL STEROID INJECTION SITE CONFIRMED BY FLUOROSCOPY performed by Faizan Yao MD at [address] [place]    SINUS SURGERY         Social History     Socioeconomic History    Marital status:      Spouse name: Not on file    Number of children: Not on file    Years of education: Not on file    Highest education level: Not on file   Occupational History    Not on file   Tobacco Use    Smoking status: Never    Smokeless tobacco: Never   Vaping Use    Vaping Use: Never used   Substance and Sexual Activity    Alcohol use: Yes     Comment: weekly couple beers    Drug use: Never    Sexual activity: Yes     Partners: Male   Other Topics Concern    Not on file   Social History Narrative    Not on file     Social Determinants of Health     Financial Resource Strain: Not on file   Food Insecurity: Not on file   Transportation Needs: Not on file   Physical Activity: Not on file   Stress: Not on file   Social Connections: Not on file   Intimate Partner Violence: Not on file   Housing Stability: Not on file       Family History   Problem Relation Age of Onset    No Known Problems Mother     Heart Attack Father        Current Outpatient Medications on File Prior to Visit   Medication Sig Dispense Refill    docusate (COLACE, DULCOLAX) 100 MG CAPS Take 200 mg by mouth in the morning and 200 mg before bedtime. estradiol (ESTRACE) 0.1 MG/GM vaginal cream Place 0.1 g vaginally every 7 days      tiotropium (SPIRIVA RESPIMAT) 2.5 MCG/ACT AERS inhaler Inhale 1 puff into the lungs 2 times daily      traZODone (DESYREL) 50 MG tablet       Biotin 5 MG TBDP Take 1 tablet by mouth daily      Cholecalciferol 50 MCG (2000 UT) CAPS 2,000 Units daily  (Patient not taking: Reported on 8/11/2022)      oxyCODONE-acetaminophen (PERCOCET) 7.5-325 MG per tablet Take 1 tablet by mouth every 4 hours as needed for Pain .       citalopram (CELEXA) 10 MG tablet Take 10 mg by mouth daily      Multiple Vitamins-Minerals (THERAPEUTIC MULTIVITAMIN-MINERALS) tablet Take 1 tablet by mouth daily       Current Facility-Administered Medications on File Prior to Visit   Medication Dose Route Frequency Provider Last Rate Last Admin    triamcinolone acetonide (KENALOG-40) injection 40 mg  40 mg Intra-artICUlar Once Vincenzo Do MD        lidocaine 1 % injection 4 mL  4 mL Intra-artICUlar Once Vincenzo oD MD        triamcinolone acetonide (KENALOG-40) injection 40 mg  40 mg Intra-artICUlar Once Vincenzo Do MD        lidocaine 1 % injection 4 mL  4 mL Intra-artICUlar Once Vincenzo Do MD           Pertinent items are noted in HPI  Review of systems reviewed from Patient History Form dated on 1/12/2021 and available in the patient's chart under the Media tab. No change. PHYSICAL EXAMINATION:  Ms. Nava Kraft is a very pleasant 64 y.o.  female who presents today in no acute distress, awake, alert, and oriented. She is well dressed, nourished and  groomed. Patient with normal affect. Height is   , weight is  , There is no height or weight on file to calculate BMI. Resting respiratory rate is 16. Examination of the gait, showed that the patient walks heel-toe with a non-antalgic gait and no limp. Examination of both knees showing full ROM, bilateral mild crepitus, tenderness on medial joint line, stable to varus and valgus stress. She has intact sensation and good pedal pulses. She has good strength in 2 planes, and has mild tenderness on deep palpation over the medial joint line. Knee reflex 1+ bilaterally. On examination of the left wrist, the incision is completely healed . No signs of any erythema or drainage. She  has no pain with the active or passive range of motion of the left wrist, full ROM. She  has intact sensation, distally, and she  is neurovascularly intact. Right hand with index DIP deformity and decrease ROM. IMAGING:  Xray 3 views of the left knee was obtained 4/26/2022 in the office and reviewed.   These demonstrate mild degenerative changes with narrowing of the joint space in the patellar joint space compartment, subchondral sclerosis, and marginal osteophytosis. Xray, 3 views left wrist taken 9/1/2021 in the office showed anatomic alignment of left distal radius, plate and screws in good position, no loosening. X-rays were taken in the office today, 3 views of the right hand, and showed no acute fracture. Significant arthritis of index DIP joint. IMPRESSION:   1-Bilateral L>R knee DJD. 2-Left distal radius ORIF and doing very well. 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 PLAN: For the knees:  I assured the patient that the xray is negative for acute fracture. I discussed with the patient the treatment options including both surgical and non-surgical treatment. We recommended Quad exercises and stretching of the calf and hamstrings which was taught to the patient today. I believe she will benefit from cortisone injection bilateral knees, and she agreed to have. PROCEDURE:    With the patient's permission, and under sterile condition, the bilateral knee was prepared and draped with alcohol and injected with a mixture of 1 mL Kenalog 40mg and 4 mL of 1% lidocaine through the medial parapatellar port area. The patient tolerated the procedure well. A band-aid was applied and the patient was advised to ice the knee for 15-20 minutes to relieve any injection site related pain. She reported a good improvement immediatly after the injection. For the wrist: She can go back to normal activity with no restrictions. ROM and wrist exercises. She will be seen PRN. I told the patient that it is not unusual to have some achy pain and swelling after a fracture. For her right hand, recommend ROM index finger, I believe she will benefit from surgery with right index finger DIP fusion.     I discussed the risks and benefits of surgery with the patient, including but not limited to infection, bleeding, pain, injury to nerves or blood vessels failure of the surgery and need for additional surgery. All the patient's questions were answered. We discussed an expected post-operative course. She  is understanding of this and wishes to proceed.        Raiza Summers MD

## 2022-12-14 ENCOUNTER — TELEPHONE (OUTPATIENT)
Dept: ORTHOPEDIC SURGERY | Age: 57
End: 2022-12-14

## 2022-12-16 NOTE — TELEPHONE ENCOUNTER
Auth: NPR  Date: 12/22/22  Reference # [id number]  Spoke with: Online  Type of SX: Outpatient  Location: [facility]  CPT: 25959   DX: M19.049  SX area: Rt hand  Insurance: [employer]

## 2022-12-16 NOTE — TELEPHONE ENCOUNTER
12/15/22  PA requsted via [employer] by online thru [employer] w/clinicals.   Reference # [id number]

## 2022-12-21 ENCOUNTER — TELEPHONE (OUTPATIENT)
Dept: ORTHOPEDIC SURGERY | Age: 57
End: 2022-12-21

## 2023-03-20 ENCOUNTER — TELEPHONE (OUTPATIENT)
Dept: ORTHOPEDIC SURGERY | Age: 58
End: 2023-03-20

## 2023-03-20 NOTE — TELEPHONE ENCOUNTER
Surgery and/or Procedure Scheduling     Contact Name: Wilbur Elliott Request: RIGHT INDEX [address] [place] [address] [address] [[id number]]  Patient Contact Number: [phone]

## 2023-03-28 ENCOUNTER — TELEPHONE (OUTPATIENT)
Dept: ORTHOPEDIC SURGERY | Age: 58
End: 2023-03-28

## 2023-03-28 ENCOUNTER — OFFICE VISIT (OUTPATIENT)
Dept: ORTHOPEDIC SURGERY | Age: 58
End: 2023-03-28

## 2023-03-28 VITALS — HEIGHT: 66 IN | WEIGHT: 150 LBS | BODY MASS INDEX: 24.11 KG/M2

## 2023-03-28 DIAGNOSIS — M17.12 PRIMARY OSTEOARTHRITIS OF LEFT KNEE: Primary | ICD-10-CM

## 2023-03-28 DIAGNOSIS — M17.11 PRIMARY OSTEOARTHRITIS OF RIGHT KNEE: ICD-10-CM

## 2023-03-28 RX ORDER — LIDOCAINE HYDROCHLORIDE 10 MG/ML
4 INJECTION, SOLUTION INFILTRATION; PERINEURAL ONCE
Status: CANCELLED | OUTPATIENT
Start: 2023-03-28 | End: 2023-03-28

## 2023-03-28 RX ORDER — TRIAMCINOLONE ACETONIDE 40 MG/ML
40 INJECTION, SUSPENSION INTRA-ARTICULAR; INTRAMUSCULAR ONCE
Status: COMPLETED | OUTPATIENT
Start: 2023-03-28 | End: 2023-03-28

## 2023-03-28 RX ORDER — BUPIVACAINE HYDROCHLORIDE 5 MG/ML
4 INJECTION, SOLUTION PERINEURAL ONCE
Status: COMPLETED | OUTPATIENT
Start: 2023-03-28 | End: 2023-03-28

## 2023-03-28 RX ADMIN — BUPIVACAINE HYDROCHLORIDE 20 MG: 5 INJECTION, SOLUTION PERINEURAL at 13:31

## 2023-03-28 RX ADMIN — TRIAMCINOLONE ACETONIDE 40 MG: 40 INJECTION, SUSPENSION INTRA-ARTICULAR; INTRAMUSCULAR at 13:38

## 2023-03-28 RX ADMIN — TRIAMCINOLONE ACETONIDE 40 MG: 40 INJECTION, SUSPENSION INTRA-ARTICULAR; INTRAMUSCULAR at 13:32

## 2023-03-28 NOTE — TELEPHONE ENCOUNTER
Auth: PETAR  Date: 04/06/23  Reference # [id number]  Spoke with: Online  Type of SX: Outpatient  Location: [facility]  CPT: 95477   DX: M19.041  SX area: Rt hand  Insurance: [employer]

## 2023-03-28 NOTE — PROGRESS NOTES
CHIEF COMPLAINT:   1-Bilateral L>R knee pain/ osteoarthritis. 2-Left distal radius 3 parts intra-articular displaced fracture, status post ORIF, 1/4/2021. 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 HISTORY:  Ms. Mary Jo Jenkins 62 y.o.  female LHD, presents today for f/u evaluation of bilateral knee pain after she felt a pop in March 2022. She has bilateral knee pain which started to worsen gradually over the past few years. She had a cortisone injection bilateral knees on 12/13/2022 and had good improvement until the past couple weeks. She is complaining of achy pain. Pain is increase with standing and walking and decrease with rest. Pain is sharp early in the morning with first few steps, dull achy pain by the end of the day. Alleviating factors: rest.  Rates pain 5/10 VAS in her knees. Pain is much worse with exercise. No radiation and no numbness and tingling sensation. She has not had any treatment for this problem. No other complaint. No h/o trauma or gout. She is also here for for postop visit left wrist and states her pain is a 0/10 VAS. No numbness or tingling sensation. No fever or Chills. Denies smoking. She is also c/o right hand index finger pain.     Past Medical History:   Diagnosis Date    Anxiety and depression     Aortic valve disease 2016    Arthritis     COPD (chronic obstructive pulmonary disease) ([facility] [address]     denies,    COVID-19 06/30/2021    History of blood transfusion     Pacemaker     Medtronic       Past Surgical History:   Procedure Laterality Date    BACK SURGERY      rods/screw L4,5,6/ DJD    CARDIAC SURGERY  2017    aortic valve replacement and hole in heart    FOREARM SURGERY Left 01/04/2021    OPEN REDUCTION INTERNAL FIXATION LEFT DISTAL RADIUS performed by Susie Jenkins MD at [address] [place] [address]      screws and pins, posterior fusion    PACEMAKER PLACEMENT  2017    medtronic model # [id number]    PAIN MANAGEMENT PROCEDURE Right 08/11/2022    RIGHT SACRAL ONE

## 2023-04-05 ENCOUNTER — ANESTHESIA EVENT (OUTPATIENT)
Dept: OPERATING ROOM | Age: 58
End: 2023-04-05
Payer: MEDICARE

## 2023-04-06 ENCOUNTER — HOSPITAL ENCOUNTER (OUTPATIENT)
Age: 58
Setting detail: OUTPATIENT SURGERY
Discharge: HOME | End: 2023-04-06
Attending: ORTHOPAEDIC SURGERY | Admitting: ORTHOPAEDIC SURGERY
Payer: MEDICARE

## 2023-04-06 ENCOUNTER — APPOINTMENT (OUTPATIENT)
Dept: GENERAL RADIOLOGY | Age: 58
End: 2023-04-06
Attending: ORTHOPAEDIC SURGERY
Payer: MEDICARE

## 2023-04-06 ENCOUNTER — ANESTHESIA (OUTPATIENT)
Dept: OPERATING ROOM | Age: 58
End: 2023-04-06
Payer: MEDICARE

## 2023-04-06 VITALS
BODY MASS INDEX: 24.59 KG/M2 | WEIGHT: 153 LBS | HEART RATE: 81 BPM | SYSTOLIC BLOOD PRESSURE: 115 MMHG | OXYGEN SATURATION: 100 % | HEIGHT: 66 IN | RESPIRATION RATE: 13 BRPM | TEMPERATURE: 97.6 F | DIASTOLIC BLOOD PRESSURE: 72 MMHG

## 2023-04-06 DIAGNOSIS — M19.049 ARTHRITIS OF FINGER: Primary | ICD-10-CM

## 2023-04-06 PROCEDURE — 7100000001 HC PACU RECOVERY - ADDTL 15 MIN: Performed by: ORTHOPAEDIC SURGERY

## 2023-04-06 PROCEDURE — 2709999900 HC NON-CHARGEABLE SUPPLY: Performed by: ORTHOPAEDIC SURGERY

## 2023-04-06 PROCEDURE — 6360000002 HC RX W HCPCS: Performed by: ORTHOPAEDIC SURGERY

## 2023-04-06 PROCEDURE — 2720000010 HC SURG SUPPLY STERILE: Performed by: ORTHOPAEDIC SURGERY

## 2023-04-06 PROCEDURE — 7100000000 HC PACU RECOVERY - FIRST 15 MIN: Performed by: ORTHOPAEDIC SURGERY

## 2023-04-06 PROCEDURE — 2500000003 HC RX 250 WO HCPCS: Performed by: ORTHOPAEDIC SURGERY

## 2023-04-06 PROCEDURE — 3600000004 HC SURGERY LEVEL 4 BASE: Performed by: ORTHOPAEDIC SURGERY

## 2023-04-06 PROCEDURE — 7100000011 HC PHASE II RECOVERY - ADDTL 15 MIN: Performed by: ORTHOPAEDIC SURGERY

## 2023-04-06 PROCEDURE — 73120 X-RAY EXAM OF HAND: CPT

## 2023-04-06 PROCEDURE — 2500000003 HC RX 250 WO HCPCS: Performed by: NURSE ANESTHETIST, CERTIFIED REGISTERED

## 2023-04-06 PROCEDURE — A4217 STERILE WATER/SALINE, 500 ML: HCPCS | Performed by: ORTHOPAEDIC SURGERY

## 2023-04-06 PROCEDURE — 3700000000 HC ANESTHESIA ATTENDED CARE: Performed by: ORTHOPAEDIC SURGERY

## 2023-04-06 PROCEDURE — 3600000014 HC SURGERY LEVEL 4 ADDTL 15MIN: Performed by: ORTHOPAEDIC SURGERY

## 2023-04-06 PROCEDURE — 6360000002 HC RX W HCPCS: Performed by: NURSE ANESTHETIST, CERTIFIED REGISTERED

## 2023-04-06 PROCEDURE — 2580000003 HC RX 258: Performed by: ORTHOPAEDIC SURGERY

## 2023-04-06 PROCEDURE — 3700000001 HC ADD 15 MINUTES (ANESTHESIA): Performed by: ORTHOPAEDIC SURGERY

## 2023-04-06 PROCEDURE — 7100000010 HC PHASE II RECOVERY - FIRST 15 MIN: Performed by: ORTHOPAEDIC SURGERY

## 2023-04-06 RX ORDER — SODIUM CHLORIDE 9 MG/ML
INJECTION, SOLUTION INTRAVENOUS CONTINUOUS
Status: DISCONTINUED | OUTPATIENT
Start: 2023-04-06 | End: 2023-04-06 | Stop reason: HOSPADM

## 2023-04-06 RX ORDER — LIDOCAINE HYDROCHLORIDE 10 MG/ML
1 INJECTION, SOLUTION EPIDURAL; INFILTRATION; INTRACAUDAL; PERINEURAL
Status: CANCELLED | OUTPATIENT
Start: 2023-04-06 | End: 2023-04-07

## 2023-04-06 RX ORDER — CEPHALEXIN 500 MG/1
500 CAPSULE ORAL 4 TIMES DAILY
Qty: 20 CAPSULE | Refills: 0 | Status: SHIPPED | OUTPATIENT
Start: 2023-04-06 | End: 2023-04-11

## 2023-04-06 RX ORDER — HYDRALAZINE HYDROCHLORIDE 20 MG/ML
10 INJECTION INTRAMUSCULAR; INTRAVENOUS
Status: DISCONTINUED | OUTPATIENT
Start: 2023-04-06 | End: 2023-04-06 | Stop reason: HOSPADM

## 2023-04-06 RX ORDER — ONDANSETRON 2 MG/ML
INJECTION INTRAMUSCULAR; INTRAVENOUS PRN
Status: DISCONTINUED | OUTPATIENT
Start: 2023-04-06 | End: 2023-04-06 | Stop reason: SDUPTHER

## 2023-04-06 RX ORDER — OXYCODONE HYDROCHLORIDE AND ACETAMINOPHEN 5; 325 MG/1; MG/1
1 TABLET ORAL EVERY 6 HOURS PRN
Qty: 12 TABLET | Refills: 0 | Status: SHIPPED | OUTPATIENT
Start: 2023-04-06 | End: 2023-04-09

## 2023-04-06 RX ORDER — FENTANYL CITRATE 50 UG/ML
25 INJECTION, SOLUTION INTRAMUSCULAR; INTRAVENOUS EVERY 5 MIN PRN
Status: DISCONTINUED | OUTPATIENT
Start: 2023-04-06 | End: 2023-04-06 | Stop reason: HOSPADM

## 2023-04-06 RX ORDER — PROPOFOL 10 MG/ML
INJECTION, EMULSION INTRAVENOUS PRN
Status: DISCONTINUED | OUTPATIENT
Start: 2023-04-06 | End: 2023-04-06 | Stop reason: SDUPTHER

## 2023-04-06 RX ORDER — HYDROMORPHONE HCL 110MG/55ML
0.5 PATIENT CONTROLLED ANALGESIA SYRINGE INTRAVENOUS EVERY 5 MIN PRN
Status: DISCONTINUED | OUTPATIENT
Start: 2023-04-06 | End: 2023-04-06 | Stop reason: HOSPADM

## 2023-04-06 RX ORDER — SODIUM CHLORIDE 0.9 % (FLUSH) 0.9 %
5-40 SYRINGE (ML) INJECTION PRN
Status: CANCELLED | OUTPATIENT
Start: 2023-04-06

## 2023-04-06 RX ORDER — DEXAMETHASONE SODIUM PHOSPHATE 4 MG/ML
INJECTION, SOLUTION INTRA-ARTICULAR; INTRALESIONAL; INTRAMUSCULAR; INTRAVENOUS; SOFT TISSUE PRN
Status: DISCONTINUED | OUTPATIENT
Start: 2023-04-06 | End: 2023-04-06 | Stop reason: SDUPTHER

## 2023-04-06 RX ORDER — SODIUM CHLORIDE 0.9 % (FLUSH) 0.9 %
5-40 SYRINGE (ML) INJECTION PRN
Status: DISCONTINUED | OUTPATIENT
Start: 2023-04-06 | End: 2023-04-06 | Stop reason: HOSPADM

## 2023-04-06 RX ORDER — LIDOCAINE HYDROCHLORIDE 20 MG/ML
INJECTION, SOLUTION INFILTRATION; PERINEURAL PRN
Status: DISCONTINUED | OUTPATIENT
Start: 2023-04-06 | End: 2023-04-06 | Stop reason: SDUPTHER

## 2023-04-06 RX ORDER — SODIUM CHLORIDE 0.9 % (FLUSH) 0.9 %
5-40 SYRINGE (ML) INJECTION EVERY 12 HOURS SCHEDULED
Status: CANCELLED | OUTPATIENT
Start: 2023-04-06

## 2023-04-06 RX ORDER — FENTANYL CITRATE 50 UG/ML
INJECTION, SOLUTION INTRAMUSCULAR; INTRAVENOUS PRN
Status: DISCONTINUED | OUTPATIENT
Start: 2023-04-06 | End: 2023-04-06 | Stop reason: SDUPTHER

## 2023-04-06 RX ORDER — SODIUM CHLORIDE 9 MG/ML
INJECTION, SOLUTION INTRAVENOUS PRN
Status: CANCELLED | OUTPATIENT
Start: 2023-04-06

## 2023-04-06 RX ORDER — SODIUM CHLORIDE 9 MG/ML
25 INJECTION, SOLUTION INTRAVENOUS PRN
Status: DISCONTINUED | OUTPATIENT
Start: 2023-04-06 | End: 2023-04-06 | Stop reason: HOSPADM

## 2023-04-06 RX ORDER — OXYCODONE HYDROCHLORIDE 5 MG/1
5 TABLET ORAL
Status: DISCONTINUED | OUTPATIENT
Start: 2023-04-06 | End: 2023-04-06 | Stop reason: HOSPADM

## 2023-04-06 RX ORDER — SODIUM CHLORIDE 0.9 % (FLUSH) 0.9 %
5-40 SYRINGE (ML) INJECTION EVERY 12 HOURS SCHEDULED
Status: DISCONTINUED | OUTPATIENT
Start: 2023-04-06 | End: 2023-04-06 | Stop reason: HOSPADM

## 2023-04-06 RX ORDER — ONDANSETRON 2 MG/ML
4 INJECTION INTRAMUSCULAR; INTRAVENOUS
Status: DISCONTINUED | OUTPATIENT
Start: 2023-04-06 | End: 2023-04-06 | Stop reason: HOSPADM

## 2023-04-06 RX ORDER — LABETALOL HYDROCHLORIDE 5 MG/ML
10 INJECTION, SOLUTION INTRAVENOUS
Status: DISCONTINUED | OUTPATIENT
Start: 2023-04-06 | End: 2023-04-06 | Stop reason: HOSPADM

## 2023-04-06 RX ORDER — BUPIVACAINE HYDROCHLORIDE 5 MG/ML
INJECTION, SOLUTION EPIDURAL; INTRACAUDAL
Status: COMPLETED | OUTPATIENT
Start: 2023-04-06 | End: 2023-04-06

## 2023-04-06 RX ADMIN — SODIUM CHLORIDE: 9 INJECTION, SOLUTION INTRAVENOUS at 06:28

## 2023-04-06 RX ADMIN — ONDANSETRON 4 MG: 2 INJECTION INTRAMUSCULAR; INTRAVENOUS at 07:23

## 2023-04-06 RX ADMIN — FENTANYL CITRATE 100 MCG: 50 INJECTION, SOLUTION INTRAMUSCULAR; INTRAVENOUS at 07:28

## 2023-04-06 RX ADMIN — DEXAMETHASONE SODIUM PHOSPHATE 8 MG: 4 INJECTION, SOLUTION INTRAMUSCULAR; INTRAVENOUS at 07:23

## 2023-04-06 RX ADMIN — PROPOFOL 200 MG: 10 INJECTION, EMULSION INTRAVENOUS at 07:23

## 2023-04-06 RX ADMIN — LIDOCAINE HYDROCHLORIDE 50 MG: 20 INJECTION, SOLUTION INFILTRATION; PERINEURAL at 07:23

## 2023-04-06 RX ADMIN — CEFAZOLIN 2000 MG: 2 INJECTION, POWDER, FOR SOLUTION INTRAMUSCULAR; INTRAVENOUS at 07:16

## 2023-04-06 RX ADMIN — FENTANYL CITRATE 100 MCG: 50 INJECTION, SOLUTION INTRAMUSCULAR; INTRAVENOUS at 07:23

## 2023-04-06 ASSESSMENT — PAIN - FUNCTIONAL ASSESSMENT: PAIN_FUNCTIONAL_ASSESSMENT: 0-10

## 2023-04-06 NOTE — H&P
Preoperative H&P Update    The patient's History and Physical in the medical record from 3/28/2023 was reviewed by me today. Past Medical History:   Diagnosis Date    Anxiety and depression     Aortic valve disease 2016    Arthritis     COPD (chronic obstructive pulmonary disease) ([facility] [address]     denies,    COVID-19 06/30/2021    History of blood transfusion     Pacemaker     Medtronic     Past Surgical History:   Procedure Laterality Date    BACK SURGERY      rods/screw L4,5,6/ DJD    CARDIAC SURGERY  2017    aortic valve replacement and hole in heart    FOREARM SURGERY Left 01/04/2021    OPEN REDUCTION INTERNAL FIXATION LEFT DISTAL RADIUS performed by Julius Graves MD at [address] [place] [address]      screws and pins, posterior fusion    PACEMAKER PLACEMENT  2017    medtronic model # [id number]    PAIN MANAGEMENT PROCEDURE Right 08/11/2022    RIGHT SACRAL ONE TRANSFORAMINAL EPIDURAL STEROID INJECTION SITE CONFIRMED BY FLUOROSCOPY performed by Erwin Councilman, MD at [address] [place] [address]       No current facility-administered medications on file prior to encounter. Current Outpatient Medications on File Prior to Encounter   Medication Sig Dispense Refill    docusate (COLACE, DULCOLAX) 100 MG CAPS Take 200 mg by mouth in the morning and 200 mg before bedtime. estradiol (ESTRACE) 0.1 MG/GM vaginal cream Place 0.1 g vaginally every 7 days      tiotropium (SPIRIVA RESPIMAT) 2.5 MCG/ACT AERS inhaler Inhale 1 puff into the lungs 2 times daily      traZODone (DESYREL) 50 MG tablet       Biotin 5 MG TBDP Take 1 tablet by mouth daily      oxyCODONE-acetaminophen (PERCOCET) 7.5-325 MG per tablet Take 1 tablet by mouth every 4 hours as needed for Pain.       citalopram (CELEXA) 10 MG tablet Take 1 tablet by mouth daily      Multiple Vitamins-Minerals (THERAPEUTIC MULTIVITAMIN-MINERALS) tablet Take 1 tablet by mouth daily         Allergies   Allergen Reactions    Compazine [Prochlorperazine Maleate] Other (See

## 2023-04-06 NOTE — ANESTHESIA POSTPROCEDURE EVALUATION
Department of Anesthesiology  Postprocedure Note    Patient: Lupe Gonzalez  MRN: [id number]  YOB: 1965  Date of evaluation: 4/6/2023      Procedure Summary     Date: 04/06/23 Room / Location: [address]    Anesthesia Start: [address] Anesthesia Stop: 8611    Procedure: RIGHT INDEX DISTAL INTERPHALANGEAL JOINT FUSION (Right) Diagnosis:       Arthritis of finger of right hand      (Arthritis of finger of right hand [M19.041])    Surgeons: Alisha Carter MD Responsible Provider: Isaiah Ruvalcaba MD    Anesthesia Type: General ASA Status: 3          Anesthesia Type: General    Layla Phase I: Layla Score: 10    Layla Phase II: Layla Score: 10      Anesthesia Post Evaluation    Patient location during evaluation: PACU  Patient participation: complete - patient participated  Level of consciousness: awake and alert  Airway patency: patent  Nausea & Vomiting: no nausea and no vomiting  Complications: no  Cardiovascular status: hemodynamically stable  Respiratory status: acceptable  Hydration status: stable  Multimodal analgesia pain management approach

## 2023-04-06 NOTE — PROGRESS NOTES
Discharge instructions review with patient and pts sister Didier Victorino. All home medications have been reviewed, pt v/u. Discharge instructions signed. Pt discharged via wheelchair. Pt discharged with all belongings. Sister Didier Victorino taking stable pt home.
Pt arrived from OR to PACU bay 2. Report received from OR staff. Pt arousable to voice. Surgical incisions dressings in place to R index finger. Pt on RA, NSR, and VSS. Will continue to monitor.
Pt awake and alert at this time. Pt on RA, and VSS. Pt denies pain and nausea, tolerating PO. Skin warm and dry, palpable pulses and able to wiggle fingers. Pt meets criteria to be discharged from Phase 1.
Teaching / education initiated regarding perioperative experience, expectations, and pain management during stay. Patient verbalized understanding.
insurance card. 19.  Visit our web site for additional information:  [employer]/patient-eprep              20.During flu season no children under the age of 15 are permitted in the hospital for the safety of all patients. 21. If you take a long acting insulin in the evening only  take half of your usual  dose the night  before your procedure              22. If you use a c-pap please bring DOS if staying overnight,             23.For your convenience [facility] has a pharmacy on site to fill your prescriptions. 24. If you use oxygen and have a portable tank please bring it  with you the DOS             25. Bring a complete list of all your medications with name and dose include any supplements. 26. Other__________________________________________   *Please call pre admission testing if you any further questions   [address]    [place] [address]. [facility]  [phone]   [address] [place] [address]       VISITOR POLICY(subject to change)    Current policy is 2 visitors per patient. No children. Mask is  at the discretion of the facility. Visiting hours are 8a-8p. Overnight visitors will be at the discretion of the nurse. All policies subject to change. All above information reviewed with patient in person or by phone. Patient verbalizes understanding. All questions and concerns addressed.                                                                                                  Patient/Rep__patient__________________                                                                                                                                    PRE OP INSTRUCTIONS

## 2023-04-06 NOTE — DISCHARGE INSTRUCTIONS
or sleepiness following surgery. Rest at home today- increase activity as tolerated. Progress slowly to a regular diet unless your physician has instructed you otherwise. Drink plenty of water. If persistent nausea and vomiting becomes a problem, call your physician. Coughing, sore throat and muscle aches are other side effects of anesthesia, and should improve with time. Do not drive or operate machinery while taking narcotics.

## 2023-04-06 NOTE — ANESTHESIA PRE PROCEDURE
GI/Hepatic/Renal:        (-) GERD and liver disease       Endo/Other:    (+) : arthritis: OA., no malignancy/cancer. (-) diabetes mellitus, hypothyroidism, hyperthyroidism, blood dyscrasia, no electrolyte abnormalities, no malignancy/cancer               Abdominal:             Vascular:     - PVD. Other Findings:             Anesthesia Plan      general     ASA 3       Induction: intravenous. MIPS: Postoperative opioids intended and Prophylactic antiemetics administered. Anesthetic plan and risks discussed with patient. Use of blood products discussed with patient whom. Plan discussed with CRNA.                     Ebony Mandel MD   4/6/2023

## 2023-04-07 NOTE — OP NOTE
alignment  of the index finger.  We irrigated the incision copiously with normal  saline.  We then closed the skin along with the flap extensor tendon as  one layer using a 4-0 nylon.  The dressing was then applied in the form  of Xeroform, 4x4s, sterile Webril, and Coban.    The patient tolerated the procedure well and was taken to the Recovery  in stable condition.    Aicha Moreno CNP was 1st Assist given the nature of the procedure that needed advanced assistance. She assisted in all aspect of the procedure, including but limited to draping in a sterile fashion, exposure of surgical area, controlling bleeding, retracting and protecting important structures, achieve and maintain bone reduction and surgical wound closure with dressing application.    POSTOPERATIVE PLAN:  The patient will be discharged home.  She was  encouraged to start range of motion, but nonweightbearing for at least  six weeks.        BETITO NOWAK MD    D: 04/06/2023 16:01:54       T: 04/07/2023 1:34:37     [email or web address]  Job#: [id number]     Doc#: [id number]    CC:  Inga Guadarrama MD

## 2023-04-18 ENCOUNTER — OFFICE VISIT (OUTPATIENT)
Dept: ORTHOPEDIC SURGERY | Age: 58
End: 2023-04-18

## 2023-04-18 DIAGNOSIS — M19.041 ARTHRITIS OF FINGER OF RIGHT HAND: Primary | ICD-10-CM

## 2023-04-18 NOTE — PROGRESS NOTES
DIAGNOSIS:  Right index finger DIP arthritis, status post arthrodesis with IM screw. DATE OF SURGERY:  4/6/2023. HISTORY OF PRESENT ILLNESS:  Ms. Consuela Sicard 62 y.o.  female right handed returns today  for 2 weeks postoperative visit. The patient denies any significant pain in the right hand, 2/10. No numbness or tingling sensation. No fever or Chills. PHYSICAL EXAMINATION:  The incision is healing well right index. No signs of any erythema or drainage. She has no pain with the active or passive range of motion of the right hand index finger, but decrease ROM. She  has intact sensation, distally, and she  is neurovascularly intact. IMAGING:  Three views right hand showed good alignment of right index finger DIP arthrodesis, IM screw in good position, no loosening. IMPRESSION:  Right index finger DIP arthritis, status post arthrodesis with IM screw, and doing very well. PLAN:  I have told the patient to work on ROM, as well as strengthening exercises. No heavy impact activities. The patient will come back for a follow up in 6 weeks. At that time, we will take 3 views of the right hand. PT if needed then. As this patient has demonstrated risk factors for osteoporosis, such as age greater than [de-identified] years and evidence of a fracture, I have referred the patient back to the primary care physician for evaluation for osteoporosis, including consideration for DEXA scanning, if this is felt to be clinically indicated. The patient is advised to contact the primary care physician to follow-up for further evaluation.        Allison Summers MD

## 2023-05-28 ENCOUNTER — APPOINTMENT (OUTPATIENT)
Dept: GENERAL RADIOLOGY | Age: 58
End: 2023-05-28
Payer: MEDICARE

## 2023-05-28 ENCOUNTER — HOSPITAL ENCOUNTER (EMERGENCY)
Age: 58
Discharge: HOME | End: 2023-05-28
Attending: EMERGENCY MEDICINE
Payer: MEDICARE

## 2023-05-28 VITALS
SYSTOLIC BLOOD PRESSURE: 123 MMHG | TEMPERATURE: 97.9 F | WEIGHT: 151.6 LBS | HEART RATE: 74 BPM | RESPIRATION RATE: 18 BRPM | DIASTOLIC BLOOD PRESSURE: 78 MMHG | BODY MASS INDEX: 24.36 KG/M2 | HEIGHT: 66 IN | OXYGEN SATURATION: 100 %

## 2023-05-28 DIAGNOSIS — S93.402A MODERATE LEFT ANKLE SPRAIN, INITIAL ENCOUNTER: Primary | ICD-10-CM

## 2023-05-28 PROCEDURE — 99283 EMERGENCY DEPT VISIT LOW MDM: CPT

## 2023-05-28 PROCEDURE — 73610 X-RAY EXAM OF ANKLE: CPT

## 2023-05-28 RX ORDER — ACETAMINOPHEN 500 MG
500 TABLET ORAL EVERY 6 HOURS PRN
Qty: 30 TABLET | Refills: 0 | Status: SHIPPED | OUTPATIENT
Start: 2023-05-28

## 2023-07-24 ENCOUNTER — TELEPHONE (OUTPATIENT)
Dept: ORTHOPEDIC SURGERY | Age: 58
End: 2023-07-24

## 2023-07-24 NOTE — TELEPHONE ENCOUNTER
No answer from patient on return call. No up to date HIPAA on file. Asked for a callback. No neeraj availability until current scheduled appointment.  Can offer much earlier availability in  or [facility] offices*

## 2023-07-24 NOTE — TELEPHONE ENCOUNTER
Appointment Request     Patient requesting earlier appointment: Yes  Appointment offered to patient: 08/15/2023  Patient Contact Number: [phone]    PATIENT IS REQUESTING SOONER APPT IN [facility]

## 2023-08-08 ENCOUNTER — OFFICE VISIT (OUTPATIENT)
Dept: ORTHOPEDIC SURGERY | Age: 58
End: 2023-08-08

## 2023-08-08 VITALS — HEIGHT: 61 IN | BODY MASS INDEX: 28.51 KG/M2 | WEIGHT: 151 LBS

## 2023-08-08 DIAGNOSIS — M17.12 PRIMARY OSTEOARTHRITIS OF LEFT KNEE: ICD-10-CM

## 2023-08-08 DIAGNOSIS — M19.041 ARTHRITIS OF FINGER OF RIGHT HAND: ICD-10-CM

## 2023-08-08 DIAGNOSIS — M17.11 PRIMARY OSTEOARTHRITIS OF RIGHT KNEE: Primary | ICD-10-CM

## 2023-08-08 RX ORDER — TRIAMCINOLONE ACETONIDE 40 MG/ML
40 INJECTION, SUSPENSION INTRA-ARTICULAR; INTRAMUSCULAR ONCE
Status: COMPLETED | OUTPATIENT
Start: 2023-08-08 | End: 2023-08-08

## 2023-08-08 RX ORDER — LIDOCAINE HYDROCHLORIDE 10 MG/ML
40 INJECTION, SOLUTION INFILTRATION; PERINEURAL ONCE
Status: COMPLETED | OUTPATIENT
Start: 2023-08-08 | End: 2023-08-08

## 2023-08-08 RX ADMIN — LIDOCAINE HYDROCHLORIDE 40 MG: 10 INJECTION, SOLUTION INFILTRATION; PERINEURAL at 09:09

## 2023-08-08 RX ADMIN — LIDOCAINE HYDROCHLORIDE 40 MG: 10 INJECTION, SOLUTION INFILTRATION; PERINEURAL at 09:10

## 2023-08-08 RX ADMIN — TRIAMCINOLONE ACETONIDE 40 MG: 40 INJECTION, SUSPENSION INTRA-ARTICULAR; INTRAMUSCULAR at 09:10

## 2023-08-08 RX ADMIN — TRIAMCINOLONE ACETONIDE 40 MG: 40 INJECTION, SUSPENSION INTRA-ARTICULAR; INTRAMUSCULAR at 09:12

## 2023-08-08 NOTE — PROGRESS NOTES
DIAGNOSIS:    1- Right index finger DIP arthritis, status post arthrodesis with IM screw. 2-Bilateral L>R knee pain/ osteoarthritis. 3-Left distal radius 3 parts intra-articular displaced fracture, status post ORIF, 1/4/2021. DATE OF SURGERY:  4/6/2023. HISTORY OF PRESENT ILLNESS:  Ms. Lilia Timmons 62 y.o.  female right handed returns today for 3 months postoperative visit. The patient denies any significant pain in the right hand, 5/10. No numbness or tingling sensation. No fever or Chills. She also came for f/u evaluation of bilateral knee pain after she felt a pop in March 2022. She has bilateral knee pain which started to worsen gradually over the past few years. She had a cortisone injection bilateral knees on 3/28/2023 and had good improvement.     Past Medical History:   Diagnosis Date    Anxiety and depression     Aortic valve disease 2016    Arthritis     COPD (chronic obstructive pulmonary disease) ([address])     dentanja,    COVID-19 06/30/2021    History of blood transfusion     Pacemaker     Medtronic       Past Surgical History:   Procedure Laterality Date    BACK SURGERY      rods/screw L4,5,6/ DJD    CARDIAC SURGERY  2017    aortic valve replacement and hole in heart    FOREARM SURGERY Left 01/04/2021    OPEN REDUCTION INTERNAL FIXATION LEFT DISTAL RADIUS performed by Juanito Steele MD at [facility] Right 4/6/2023    RIGHT INDEX DISTAL INTERPHALANGEAL JOINT FUSION performed by Juanito Steele MD at [facility]      screws and pins, posterior fusion    PACEMAKER PLACEMENT  2017    medtronic model # A2DR01    PAIN MANAGEMENT PROCEDURE Right 08/11/2022    RIGHT SACRAL ONE TRANSFORAMINAL EPIDURAL STEROID INJECTION SITE CONFIRMED BY FLUOROSCOPY performed by Elgin Perea MD at [facility] OR    SINUS SURGERY         Social History     Socioeconomic History    Marital status:      Spouse name: Not on file    Number of children: Not on file    Years of education:

## 2023-08-14 ENCOUNTER — TELEPHONE (OUTPATIENT)
Dept: ORTHOPEDIC SURGERY | Age: 58
End: 2023-08-14

## 2023-08-14 NOTE — TELEPHONE ENCOUNTER
LVM notifying Ana Arellano that provider has had an emergency and her appointment tomorrow will be cancelled. Asked that she call back to try to reschedule for next week. May offer an appointment with Katy Vera this week, too.

## 2023-10-12 ENCOUNTER — TELEPHONE (OUTPATIENT)
Dept: CARDIAC REHAB | Age: 58
End: 2023-10-12

## 2023-10-12 NOTE — TELEPHONE ENCOUNTER
Patient scheduled cardiac rehab initial assessment for 10/16/23 at 11:15 am. Patient does not want to commit to 36 sessions due to travel distance. Patient agreed to do 12 sessions. Directions to department provided.

## 2023-10-13 ASSESSMENT — EJECTION FRACTION: EF_VALUE: 55

## 2023-10-16 ENCOUNTER — HOSPITAL ENCOUNTER (OUTPATIENT)
Dept: CARDIAC REHAB | Age: 58
Setting detail: THERAPIES SERIES
Discharge: HOME | End: 2023-10-16
Payer: MEDICARE

## 2023-10-20 ENCOUNTER — HOSPITAL ENCOUNTER (OUTPATIENT)
Dept: CARDIAC REHAB | Age: 58
Setting detail: THERAPIES SERIES
Discharge: HOME | End: 2023-10-20
Payer: MEDICARE

## 2023-10-20 VITALS — WEIGHT: 147 LBS | HEIGHT: 66 IN | OXYGEN SATURATION: 99 % | BODY MASS INDEX: 23.63 KG/M2 | RESPIRATION RATE: 18 BRPM

## 2023-10-20 PROCEDURE — 93798 PHYS/QHP OP CAR RHAB W/ECG: CPT

## 2023-10-20 RX ORDER — METOPROLOL SUCCINATE 50 MG/1
50 TABLET, EXTENDED RELEASE ORAL DAILY
COMMUNITY

## 2023-10-20 RX ORDER — ASPIRIN 81 MG/1
81 TABLET, CHEWABLE ORAL DAILY
COMMUNITY

## 2023-10-20 SDOH — ECONOMIC STABILITY: TRANSPORTATION INSECURITY
IN THE PAST 12 MONTHS, HAS LACK OF TRANSPORTATION KEPT YOU FROM MEETINGS, WORK, OR FROM GETTING THINGS NEEDED FOR DAILY LIVING?: NO

## 2023-10-20 SDOH — ECONOMIC STABILITY: INCOME INSECURITY: IN THE LAST 12 MONTHS, WAS THERE A TIME WHEN YOU WERE NOT ABLE TO PAY THE MORTGAGE OR RENT ON TIME?: NO

## 2023-10-20 SDOH — ECONOMIC STABILITY: FOOD INSECURITY: WITHIN THE PAST 12 MONTHS, THE FOOD YOU BOUGHT JUST DIDN'T LAST AND YOU DIDN'T HAVE MONEY TO GET MORE.: NEVER TRUE

## 2023-10-20 SDOH — ECONOMIC STABILITY: HOUSING INSECURITY
IN THE LAST 12 MONTHS, WAS THERE A TIME WHEN YOU DID NOT HAVE A STEADY PLACE TO SLEEP OR SLEPT IN A SHELTER (INCLUDING NOW)?: NO

## 2023-10-20 SDOH — ECONOMIC STABILITY: FOOD INSECURITY: WITHIN THE PAST 12 MONTHS, YOU WORRIED THAT YOUR FOOD WOULD RUN OUT BEFORE YOU GOT MONEY TO BUY MORE.: NEVER TRUE

## 2023-10-20 SDOH — ECONOMIC STABILITY: HOUSING INSECURITY: IN THE LAST 12 MONTHS, HOW MANY PLACES HAVE YOU LIVED?: 2

## 2023-10-20 SDOH — ECONOMIC STABILITY: TRANSPORTATION INSECURITY
IN THE PAST 12 MONTHS, HAS THE LACK OF TRANSPORTATION KEPT YOU FROM MEDICAL APPOINTMENTS OR FROM GETTING MEDICATIONS?: NO

## 2023-10-20 SDOH — HEALTH STABILITY: PHYSICAL HEALTH: ON AVERAGE, HOW MANY DAYS PER WEEK DO YOU ENGAGE IN MODERATE TO STRENUOUS EXERCISE (LIKE A BRISK WALK)?: 7 DAYS

## 2023-10-20 SDOH — HEALTH STABILITY: PHYSICAL HEALTH: ON AVERAGE, HOW MANY MINUTES DO YOU ENGAGE IN EXERCISE AT THIS LEVEL?: 20 MIN

## 2023-10-20 ASSESSMENT — PATIENT HEALTH QUESTIONNAIRE - PHQ9
2. FEELING DOWN, DEPRESSED OR HOPELESS: 0
3. TROUBLE FALLING OR STAYING ASLEEP: 3
10. IF YOU CHECKED OFF ANY PROBLEMS, HOW DIFFICULT HAVE THESE PROBLEMS MADE IT FOR YOU TO DO YOUR WORK, TAKE CARE OF THINGS AT HOME, OR GET ALONG WITH OTHER PEOPLE: 0
7. TROUBLE CONCENTRATING ON THINGS, SUCH AS READING THE NEWSPAPER OR WATCHING TELEVISION: 0
8. MOVING OR SPEAKING SO SLOWLY THAT OTHER PEOPLE COULD HAVE NOTICED. OR THE OPPOSITE, BEING SO FIGETY OR RESTLESS THAT YOU HAVE BEEN MOVING AROUND A LOT MORE THAN USUAL: 0
SUM OF ALL RESPONSES TO PHQ QUESTIONS 1-9: 8
1. LITTLE INTEREST OR PLEASURE IN DOING THINGS: NOT AT ALL
SUM OF ALL RESPONSES TO PHQ QUESTIONS 1-9: 8
6. FEELING BAD ABOUT YOURSELF - OR THAT YOU ARE A FAILURE OR HAVE LET YOURSELF OR YOUR FAMILY DOWN: 0
SUM OF ALL RESPONSES TO PHQ QUESTIONS 1-9: 8
9. THOUGHTS THAT YOU WOULD BE BETTER OFF DEAD, OR OF HURTING YOURSELF: 0
SUM OF ALL RESPONSES TO PHQ9 QUESTIONS 1 & 2: 0
SUM OF ALL RESPONSES TO PHQ9 QUESTIONS 1 & 2: 0
SUM OF ALL RESPONSES TO PHQ QUESTIONS 1-9: 8
1. LITTLE INTEREST OR PLEASURE IN DOING THINGS: 0
2. FEELING DOWN, DEPRESSED OR HOPELESS: NOT AT ALL
4. FEELING TIRED OR HAVING LITTLE ENERGY: 2
5. POOR APPETITE OR OVEREATING: 3

## 2023-10-20 ASSESSMENT — SOCIAL DETERMINANTS OF HEALTH (SDOH)
WITHIN THE LAST YEAR, HAVE YOU BEEN KICKED, HIT, SLAPPED, OR OTHERWISE PHYSICALLY HURT BY YOUR PARTNER OR EX-PARTNER?: NO
IN A TYPICAL WEEK, HOW MANY TIMES DO YOU TALK ON THE PHONE WITH FAMILY, FRIENDS, OR NEIGHBORS?: MORE THAN THREE TIMES A WEEK
DO YOU BELONG TO ANY CLUBS OR ORGANIZATIONS SUCH AS CHURCH GROUPS UNIONS, FRATERNAL OR ATHLETIC GROUPS, OR SCHOOL GROUPS?: YES
WITHIN THE LAST YEAR, HAVE YOU BEEN HUMILIATED OR EMOTIONALLY ABUSED IN OTHER WAYS BY YOUR PARTNER OR EX-PARTNER?: NO
HOW OFTEN DO YOU ATTENT MEETINGS OF THE CLUB OR ORGANIZATION YOU BELONG TO?: 1 TO 4 TIMES PER YEAR
WITHIN THE LAST YEAR, HAVE YOU BEEN AFRAID OF YOUR PARTNER OR EX-PARTNER?: NO
WITHIN THE LAST YEAR, HAVE TO BEEN RAPED OR FORCED TO HAVE ANY KIND OF SEXUAL ACTIVITY BY YOUR PARTNER OR EX-PARTNER?: NO
HOW OFTEN DO YOU ATTEND CHURCH OR RELIGIOUS SERVICES?: MORE THAN 4 TIMES PER YEAR
HOW HARD IS IT FOR YOU TO PAY FOR THE VERY BASICS LIKE FOOD, HOUSING, MEDICAL CARE, AND HEATING?: NOT HARD AT ALL
HOW OFTEN DO YOU GET TOGETHER WITH FRIENDS OR RELATIVES?: MORE THAN THREE TIMES A WEEK

## 2023-10-20 ASSESSMENT — LIFESTYLE VARIABLES
HOW MANY STANDARD DRINKS CONTAINING ALCOHOL DO YOU HAVE ON A TYPICAL DAY: 1 OR 2
ALCOHOL_AMOUNT: 2-3
HOW OFTEN DO YOU HAVE A DRINK CONTAINING ALCOHOL: 2-3 TIMES A WEEK
ALCOHOL_USE: WEEKLY
ALCOHOL_TYPE: BEER

## 2023-10-20 ASSESSMENT — EXERCISE STRESS TEST
PEAK_BP: 110/78
PEAK_HR: 91
PEAK_BP: 110/78

## 2023-10-20 NOTE — PLAN OF CARE
Cardiopulmonary Rehab Treatment Plan   Name: Mono Orozco Assessment Date: 10/20/2023   : 1965 Primary Diagnosis: Treatment Diagnosis 1: Valve (TMVR)      Age: 62 y.o. Referring Physician:  Soraida Ortega   MRN: [id number] Primary Care Physician: Jethro Mcfarland MD   Treatment Diagnosis  Treatment Diagnosis 1: Valve (TMVR)  Valve Date: 10/10/23  Referral Date: 10/11/23  Significant Cardiovascular History: Pacemaker    Individual Treatment Plan  ITP Visit Type: Initial assessment  1st Date of Exercise : 10/20/23  ITP Next Review Date:  (sent on 10/20/23)  Visit #/Total Visits:   EF%: 55 %  Risk Stratification: Moderate  ITP: Exercise; Psychosocial; Nutrition; Education    Exercise   Stages of Change: Preparation  Assisted Devices: None  Sharp Total Score: 0    Data Measured Before Walk  Heart Rate: 70  Blood Pressure: 108/78  O2 Saturation: 99  O2 Device: Room air  RPD: 0  RPE: 0    Data Measured Immediately After Walk  Distance Walked in : ft  Distance Walked (ft): 1495 ft  Peak Heart Rate: 91  Peak Blood Pressure: 110/78  RPE: 7  O2 Saturation: 99    Data Measured at 5 Minutes After Walk  Heart Rate: 62  Blood Pressure: 104/64  SpO2: 97 %  O2 Device: Room air    Exercise Prescription  Mode: Track; Treadmill; Bike; Stepper; Ergometer; Elliptical; Rower  Frequency per week: 2-3 supervised sessions weekly  Duration Per Session: 20-36+ minutes of steady aerobic exercise  Intensity METS      : 3-6 (Increase workloads 0.5-1.0 METS/weekly)  RPE: 11-14  Progression: Start w/1-2 sets of 8-12 repetitions for ea. muscle group. Use1-5# initially (very light resistance).   Resistance Training: Yes    Exercise Blood Pressures  Resting BP: 108/78  Peak BP: 110/78  Is BP WDL: Yes    Exercise Activity at Home  Type: pt states she walks everyday (also has a treadmill for winter use.)  Frequency: daily  Duration: 20 min or more  Resistance Training: Yes (hand weights/bands)    Exercise Education  Education:

## 2023-10-20 NOTE — PROGRESS NOTES
Initial assessment completed for Cardiac Rehab on 10/20/23. Pt will attend Cardiac Rehab on MW at the 0915 class, to start on 10/23/23.

## 2023-10-23 ENCOUNTER — HOSPITAL ENCOUNTER (OUTPATIENT)
Dept: CARDIAC REHAB | Age: 58
Setting detail: THERAPIES SERIES
Discharge: HOME | End: 2023-10-23
Payer: MEDICARE

## 2023-10-23 PROCEDURE — 93798 PHYS/QHP OP CAR RHAB W/ECG: CPT

## 2023-10-25 ENCOUNTER — HOSPITAL ENCOUNTER (OUTPATIENT)
Dept: CARDIAC REHAB | Age: 58
Setting detail: THERAPIES SERIES
Discharge: HOME | End: 2023-10-25
Payer: MEDICARE

## 2023-10-25 PROCEDURE — 93798 PHYS/QHP OP CAR RHAB W/ECG: CPT

## 2023-10-27 ENCOUNTER — HOSPITAL ENCOUNTER (OUTPATIENT)
Dept: CARDIAC REHAB | Age: 58
Setting detail: THERAPIES SERIES
End: 2023-10-27
Payer: MEDICARE

## 2023-10-30 ENCOUNTER — HOSPITAL ENCOUNTER (OUTPATIENT)
Dept: CARDIAC REHAB | Age: 58
Setting detail: THERAPIES SERIES
Discharge: HOME | End: 2023-10-30
Payer: MEDICARE

## 2023-10-30 PROCEDURE — 93798 PHYS/QHP OP CAR RHAB W/ECG: CPT

## 2023-11-01 ENCOUNTER — HOSPITAL ENCOUNTER (OUTPATIENT)
Dept: CARDIAC REHAB | Age: 58
Setting detail: THERAPIES SERIES
Discharge: HOME | End: 2023-11-01
Payer: MEDICARE

## 2023-11-01 PROCEDURE — 93798 PHYS/QHP OP CAR RHAB W/ECG: CPT

## 2023-11-03 ENCOUNTER — HOSPITAL ENCOUNTER (OUTPATIENT)
Dept: CARDIAC REHAB | Age: 58
Setting detail: THERAPIES SERIES
Discharge: HOME | End: 2023-11-03
Payer: MEDICARE

## 2023-11-03 PROCEDURE — 93798 PHYS/QHP OP CAR RHAB W/ECG: CPT

## 2023-11-06 ENCOUNTER — TELEPHONE (OUTPATIENT)
Dept: ORTHOPEDIC SURGERY | Age: 58
End: 2023-11-06

## 2023-11-06 ENCOUNTER — HOSPITAL ENCOUNTER (OUTPATIENT)
Dept: CARDIAC REHAB | Age: 58
Setting detail: THERAPIES SERIES
Discharge: HOME | End: 2023-11-06
Payer: MEDICARE

## 2023-11-06 PROCEDURE — 93798 PHYS/QHP OP CAR RHAB W/ECG: CPT

## 2023-11-06 NOTE — TELEPHONE ENCOUNTER
Appointment Request     Patient requesting earlier appointment: Yes  Appointment offered to patient: YES   Patient Contact Number: [phone]

## 2023-11-07 ENCOUNTER — OFFICE VISIT (OUTPATIENT)
Dept: ORTHOPEDIC SURGERY | Age: 58
End: 2023-11-07

## 2023-11-07 DIAGNOSIS — M17.12 PRIMARY OSTEOARTHRITIS OF LEFT KNEE: ICD-10-CM

## 2023-11-07 DIAGNOSIS — M17.11 PRIMARY OSTEOARTHRITIS OF RIGHT KNEE: ICD-10-CM

## 2023-11-07 DIAGNOSIS — M19.041 ARTHRITIS OF FINGER OF RIGHT HAND: Primary | ICD-10-CM

## 2023-11-07 RX ORDER — TRIAMCINOLONE ACETONIDE 40 MG/ML
40 INJECTION, SUSPENSION INTRA-ARTICULAR; INTRAMUSCULAR ONCE
Status: COMPLETED | OUTPATIENT
Start: 2023-11-07 | End: 2023-11-07

## 2023-11-07 RX ORDER — LIDOCAINE HYDROCHLORIDE 10 MG/ML
4 INJECTION, SOLUTION INFILTRATION; PERINEURAL ONCE
Status: COMPLETED | OUTPATIENT
Start: 2023-11-07 | End: 2023-11-07

## 2023-11-07 RX ADMIN — TRIAMCINOLONE ACETONIDE 40 MG: 40 INJECTION, SUSPENSION INTRA-ARTICULAR; INTRAMUSCULAR at 09:16

## 2023-11-07 RX ADMIN — LIDOCAINE HYDROCHLORIDE 4 ML: 10 INJECTION, SOLUTION INFILTRATION; PERINEURAL at 09:18

## 2023-11-07 RX ADMIN — LIDOCAINE HYDROCHLORIDE 4 ML: 10 INJECTION, SOLUTION INFILTRATION; PERINEURAL at 09:17

## 2023-11-07 RX ADMIN — TRIAMCINOLONE ACETONIDE 40 MG: 40 INJECTION, SUSPENSION INTRA-ARTICULAR; INTRAMUSCULAR at 09:17

## 2023-11-07 NOTE — PROGRESS NOTES
DIAGNOSIS:    1- Right index finger DIP arthritis, status post arthrodesis with IM screw. 2-Bilateral L>R knee pain/ osteoarthritis. 3-Left distal radius 3 parts intra-articular displaced fracture, status post ORIF, 1/4/2021. DATE OF SURGERY:  4/6/2023. HISTORY OF PRESENT ILLNESS:  Ms. Levi Jones 62 y.o.  female right handed returns today for 3 months postoperative visit. The patient denies any significant pain in the right hand, 4/10. No numbness or tingling sensation. No fever or Chills. She also came for f/u evaluation of bilateral knee pain after she felt a pop in March 2022. She had bilateral knees cortisone injection on 8/8/2023 with good improvement. She has bilateral knee pain which started to worsen gradually over the past few years. She had a cortisone injection bilateral knees on 3/28/2023 and had good improvement.     Past Medical History:   Diagnosis Date    Anxiety and depression     Aortic valve disease 2016    Arthritis     COPD (chronic obstructive pulmonary disease) ([address])     denies,    COVID-19 06/30/2021    History of blood transfusion     Hyperlipidemia     Pacemaker     Medtronic       Past Surgical History:   Procedure Laterality Date    BACK SURGERY      rods/screw L4,5,6/ DJD    CARDIAC SURGERY  2017    aortic valve replacement and hole in heart    CARDIAC SURGERY  10/10/2023    Transcatheter Mitral Valve Replacement using Transesophageal Echocardiogram/ Valve in Valve performed by Cuba Feldman MD    FOREARM SURGERY Left 01/04/2021    OPEN REDUCTION INTERNAL FIXATION LEFT DISTAL RADIUS performed by David Fraser MD at [facility] Right 04/06/2023    RIGHT INDEX DISTAL INTERPHALANGEAL JOINT FUSION performed by David Fraser MD at [facility]      screws and pins, posterior fusion    PACEMAKER PLACEMENT  2017    medtronic model # [id number]    PAIN MANAGEMENT PROCEDURE Right 08/11/2022    RIGHT SACRAL ONE TRANSFORAMINAL EPIDURAL STEROID

## 2023-11-08 ENCOUNTER — HOSPITAL ENCOUNTER (OUTPATIENT)
Dept: CARDIAC REHAB | Age: 58
Setting detail: THERAPIES SERIES
Discharge: HOME | End: 2023-11-08
Payer: MEDICARE

## 2023-11-08 PROCEDURE — 93798 PHYS/QHP OP CAR RHAB W/ECG: CPT

## 2023-11-10 ENCOUNTER — HOSPITAL ENCOUNTER (OUTPATIENT)
Dept: CARDIAC REHAB | Age: 58
Setting detail: THERAPIES SERIES
Discharge: HOME | End: 2023-11-10
Payer: MEDICARE

## 2023-11-10 PROCEDURE — 93798 PHYS/QHP OP CAR RHAB W/ECG: CPT

## 2023-11-13 ENCOUNTER — HOSPITAL ENCOUNTER (OUTPATIENT)
Dept: CARDIAC REHAB | Age: 58
Setting detail: THERAPIES SERIES
Discharge: HOME | End: 2023-11-13
Payer: MEDICARE

## 2023-11-13 PROCEDURE — 93798 PHYS/QHP OP CAR RHAB W/ECG: CPT

## 2023-11-13 NOTE — PLAN OF CARE
Risk factors; Understand target guidelines for B/P    Patient Education   Education: Risk factors  Hypertension: No  Is BP WDL: Yes  Med(s) Change: Yes  BP Meds: pt states she was started on Toprol yesterday. BB Prescribed: Yes  BB Adherent: Yes  Statins Prescribed: No  Education Target Goals  Target Goals: Risk factors; Understand target guidelines for B/P    Staff Treatment Goals  Exercise Goal: Understand appropriate RPE levels. Exercise Goal Status: Progressing  Exercise Goal Comments: RPE levels utilized during class. Focusing on identifyng personal levels to improve targets and progression. Exercise Goal Assessment: Problems/barriers  Blood Pressure Goal: Identify the benefits of exercise in the treatment of hypertension. Blood Pressure Goal Status: Progressing  Psychosocial Goal: Identify what stress/depression is & discussion of stressors (both positive and negative). Psychsocial Goal Status: Progressing  Psychosocial Goal Assessment: Key functional changes  Nutrition Goal: Discuss label interpretation and begin reading nutrition labels while shopping. Nutrition Goal Status: Progressing  Nutrition Goal Assessment: Recommendations        Provider Review and Approval of this ITP    The above treatment plan and goals have been set for your patient during Cardiac Rehabilitation.  Please review and Electronically Cosign        Electronically signed by Jessica Rodríguez RN on 11/13/23 at 9:53 AM EST

## 2023-11-14 ENCOUNTER — APPOINTMENT (RX ONLY)
Dept: URBAN - METROPOLITAN AREA CLINIC 170 | Facility: CLINIC | Age: 58
Setting detail: DERMATOLOGY
End: 2023-11-14

## 2023-11-14 DIAGNOSIS — L82.1 OTHER SEBORRHEIC KERATOSIS: ICD-10-CM | Status: STABLE

## 2023-11-14 DIAGNOSIS — D22 MELANOCYTIC NEVI: ICD-10-CM | Status: STABLE

## 2023-11-14 DIAGNOSIS — L81.4 OTHER MELANIN HYPERPIGMENTATION: ICD-10-CM | Status: STABLE

## 2023-11-14 DIAGNOSIS — D18.0 HEMANGIOMA: ICD-10-CM | Status: STABLE

## 2023-11-14 PROBLEM — D18.01 HEMANGIOMA OF SKIN AND SUBCUTANEOUS TISSUE: Status: ACTIVE | Noted: 2023-11-14

## 2023-11-14 PROBLEM — D22.5 MELANOCYTIC NEVI OF TRUNK: Status: ACTIVE | Noted: 2023-11-14

## 2023-11-14 PROBLEM — D22.39 MELANOCYTIC NEVI OF OTHER PARTS OF FACE: Status: ACTIVE | Noted: 2023-11-14

## 2023-11-14 PROCEDURE — ? PHOTO-DOCUMENTATION

## 2023-11-14 PROCEDURE — 99213 OFFICE O/P EST LOW 20 MIN: CPT

## 2023-11-14 PROCEDURE — ? FULL BODY SKIN EXAM

## 2023-11-14 PROCEDURE — ? COUNSELING

## 2023-11-14 PROCEDURE — ? LIQUID NITROGEN (COSMETIC)

## 2023-11-14 PROCEDURE — ? TREATMENT REGIMEN

## 2023-11-14 ASSESSMENT — LOCATION SIMPLE DESCRIPTION DERM
LOCATION SIMPLE: RIGHT SHOULDER
LOCATION SIMPLE: ABDOMEN
LOCATION SIMPLE: RIGHT UPPER BACK
LOCATION SIMPLE: LEFT LOWER BACK
LOCATION SIMPLE: LEFT UPPER BACK
LOCATION SIMPLE: NOSE

## 2023-11-14 ASSESSMENT — LOCATION ZONE DERM
LOCATION ZONE: TRUNK
LOCATION ZONE: ARM
LOCATION ZONE: NOSE

## 2023-11-14 ASSESSMENT — LOCATION DETAILED DESCRIPTION DERM
LOCATION DETAILED: LEFT SUPERIOR MEDIAL MIDBACK
LOCATION DETAILED: RIGHT MID-UPPER BACK
LOCATION DETAILED: EPIGASTRIC SKIN
LOCATION DETAILED: RIGHT POSTERIOR SHOULDER
LOCATION DETAILED: LEFT SUPERIOR LATERAL UPPER BACK
LOCATION DETAILED: RIGHT SUPERIOR UPPER BACK
LOCATION DETAILED: NASAL DORSUM
LOCATION DETAILED: PERIUMBILICAL SKIN

## 2023-11-14 NOTE — PROCEDURE: MIPS QUALITY
Quality 431: Preventive Care And Screening: Unhealthy Alcohol Use - Screening: Patient not identified as an unhealthy alcohol user when screened for unhealthy alcohol use using a systematic screening method
Detail Level: Detailed
Quality 226: Preventive Care And Screening: Tobacco Use: Screening And Cessation Intervention: Patient screened for tobacco use and is an ex/non-smoker
Quality 130: Documentation Of Current Medications In The Medical Record: Current Medications Documented
Additional Notes: PCP at [facility]

## 2023-11-14 NOTE — HPI: EVALUATION OF SKIN LESION(S)
What Type Of Note Output Would You Prefer (Optional)?: Bullet Format
Hpi Title: Evaluation of Skin Lesions
Have Your Spot(S) Been Treated In The Past?: has not been treated
Additional History: Left nasal ala darker. Left mid abd-change. Back spot mid? Darker

## 2023-11-15 ENCOUNTER — HOSPITAL ENCOUNTER (OUTPATIENT)
Dept: CARDIAC REHAB | Age: 58
Setting detail: THERAPIES SERIES
Discharge: HOME | End: 2023-11-15
Payer: MEDICARE

## 2023-11-15 PROCEDURE — 93798 PHYS/QHP OP CAR RHAB W/ECG: CPT

## 2023-11-17 ENCOUNTER — HOSPITAL ENCOUNTER (OUTPATIENT)
Dept: CARDIAC REHAB | Age: 58
Setting detail: THERAPIES SERIES
Discharge: HOME | End: 2023-11-17
Payer: MEDICARE

## 2023-11-17 PROCEDURE — 93798 PHYS/QHP OP CAR RHAB W/ECG: CPT

## 2023-11-24 ENCOUNTER — APPOINTMENT (OUTPATIENT)
Dept: CARDIAC REHAB | Age: 58
End: 2023-11-24
Payer: MEDICARE

## 2023-11-27 ENCOUNTER — APPOINTMENT (OUTPATIENT)
Dept: CARDIAC REHAB | Age: 58
End: 2023-11-27
Payer: MEDICARE

## 2023-11-29 ENCOUNTER — APPOINTMENT (OUTPATIENT)
Dept: CARDIAC REHAB | Age: 58
End: 2023-11-29
Payer: MEDICARE

## 2024-02-06 ENCOUNTER — OFFICE VISIT (OUTPATIENT)
Dept: ORTHOPEDIC SURGERY | Age: 59
End: 2024-02-06

## 2024-02-06 VITALS — BODY MASS INDEX: 23.63 KG/M2 | WEIGHT: 147 LBS | HEIGHT: 66 IN

## 2024-02-06 DIAGNOSIS — M17.12 PRIMARY OSTEOARTHRITIS OF LEFT KNEE: ICD-10-CM

## 2024-02-06 DIAGNOSIS — M17.11 PRIMARY OSTEOARTHRITIS OF RIGHT KNEE: ICD-10-CM

## 2024-02-06 DIAGNOSIS — M19.041 ARTHRITIS OF FINGER OF RIGHT HAND: Primary | ICD-10-CM

## 2024-02-06 RX ORDER — TRIAMCINOLONE ACETONIDE 40 MG/ML
40 INJECTION, SUSPENSION INTRA-ARTICULAR; INTRAMUSCULAR ONCE
Status: COMPLETED | OUTPATIENT
Start: 2024-02-06 | End: 2024-02-06

## 2024-02-06 RX ORDER — LIDOCAINE HYDROCHLORIDE 10 MG/ML
4 INJECTION, SOLUTION INFILTRATION; PERINEURAL ONCE
Status: COMPLETED | OUTPATIENT
Start: 2024-02-06 | End: 2024-02-06

## 2024-02-06 RX ADMIN — TRIAMCINOLONE ACETONIDE 40 MG: 40 INJECTION, SUSPENSION INTRA-ARTICULAR; INTRAMUSCULAR at 09:55

## 2024-02-06 RX ADMIN — LIDOCAINE HYDROCHLORIDE 4 ML: 10 INJECTION, SOLUTION INFILTRATION; PERINEURAL at 09:55

## 2024-02-06 RX ADMIN — LIDOCAINE HYDROCHLORIDE 4 ML: 10 INJECTION, SOLUTION INFILTRATION; PERINEURAL at 09:54

## 2024-02-06 RX ADMIN — TRIAMCINOLONE ACETONIDE 40 MG: 40 INJECTION, SUSPENSION INTRA-ARTICULAR; INTRAMUSCULAR at 09:56

## 2024-05-14 ENCOUNTER — OFFICE VISIT (OUTPATIENT)
Dept: ORTHOPEDIC SURGERY | Age: 59
End: 2024-05-14
Payer: MEDICARE

## 2024-05-14 DIAGNOSIS — S52.502A CLOSED FRACTURE OF DISTAL END OF LEFT RADIUS, UNSPECIFIED FRACTURE MORPHOLOGY, INITIAL ENCOUNTER: ICD-10-CM

## 2024-05-14 DIAGNOSIS — M19.041 ARTHRITIS OF FINGER OF RIGHT HAND: Primary | ICD-10-CM

## 2024-05-14 DIAGNOSIS — M17.12 PRIMARY OSTEOARTHRITIS OF LEFT KNEE: ICD-10-CM

## 2024-05-14 DIAGNOSIS — M17.11 PRIMARY OSTEOARTHRITIS OF RIGHT KNEE: ICD-10-CM

## 2024-05-14 PROCEDURE — 20610 DRAIN/INJ JOINT/BURSA W/O US: CPT | Performed by: ORTHOPAEDIC SURGERY

## 2024-05-14 PROCEDURE — 99214 OFFICE O/P EST MOD 30 MIN: CPT | Performed by: ORTHOPAEDIC SURGERY

## 2024-05-14 RX ORDER — TRIAMCINOLONE ACETONIDE 40 MG/ML
40 INJECTION, SUSPENSION INTRA-ARTICULAR; INTRAMUSCULAR ONCE
Status: COMPLETED | OUTPATIENT
Start: 2024-05-14 | End: 2024-05-14

## 2024-05-14 RX ORDER — LIDOCAINE HYDROCHLORIDE 10 MG/ML
4 INJECTION, SOLUTION INFILTRATION; PERINEURAL ONCE
Status: COMPLETED | OUTPATIENT
Start: 2024-05-14 | End: 2024-05-14

## 2024-05-14 RX ADMIN — TRIAMCINOLONE ACETONIDE 40 MG: 40 INJECTION, SUSPENSION INTRA-ARTICULAR; INTRAMUSCULAR at 09:18

## 2024-05-14 RX ADMIN — LIDOCAINE HYDROCHLORIDE 4 ML: 10 INJECTION, SOLUTION INFILTRATION; PERINEURAL at 09:18

## 2024-05-14 RX ADMIN — LIDOCAINE HYDROCHLORIDE 4 ML: 10 INJECTION, SOLUTION INFILTRATION; PERINEURAL at 09:17

## 2024-05-22 NOTE — PROGRESS NOTES
DIAGNOSIS:    1- Right index finger DIP arthritis, status post arthrodesis with IM screw.  2-Bilateral L>R knee pain/ osteoarthritis.  3-Left distal radius 3 parts intra-articular displaced fracture, status post ORIF, 1/4/2021.    DATE OF SURGERY:  4/6/2023.    HISTORY OF PRESENT ILLNESS:  Ms. Lerma 58 y.o.  female right handed returns today for one year postoperative visit.  The patient denies any significant pain in the right hand, 1/10.  No numbness or tingling sensation. No fever or Chills.      She also came for f/u evaluation of bilateral knee pain after she felt a pop in March 2022. She had bilateral knees cortisone injection on 2/6/2024 with good improvement. She has bilateral knee pain which started to worsen gradually over the past few years, 8/10.  She had a cortisone injection bilateral knees on 11/7/2023 and had good improvement. She has a mechanical heart valve and on Eliquis.    Past Medical History:   Diagnosis Date    Anxiety and depression     Aortic valve disease 2016    Arthritis     COPD (chronic obstructive pulmonary disease) (HCC)     denies,    COVID-19 06/30/2021    History of blood transfusion     Hyperlipidemia     Pacemaker     Medtronic       Past Surgical History:   Procedure Laterality Date    BACK SURGERY      rods/screw L4,5,6/ DJD    CARDIAC SURGERY  2017    aortic valve replacement and hole in heart    CARDIAC SURGERY  10/10/2023    Transcatheter Mitral Valve Replacement using Transesophageal Echocardiogram/ Valve in Valve performed by Jabier Pitts MD    FOREARM SURGERY Left 01/04/2021    OPEN REDUCTION INTERNAL FIXATION LEFT DISTAL RADIUS performed by Renita Salas MD at [facility] OR    HAND SURGERY Right 04/06/2023    RIGHT INDEX DISTAL INTERPHALANGEAL JOINT FUSION performed by Renita Salas MD at [facility] OR    NECK SURGERY      screws and pins, posterior fusion    PACEMAKER PLACEMENT  2017    medtronic model # [id number]    PAIN MANAGEMENT PROCEDURE Right 08/11/2022

## 2024-08-07 ENCOUNTER — HOSPITAL ENCOUNTER (OUTPATIENT)
Dept: PHYSICAL THERAPY | Age: 59
Setting detail: THERAPIES SERIES
Discharge: HOME | End: 2024-08-07
Payer: MEDICARE

## 2024-08-07 DIAGNOSIS — M25.471 PAIN AND SWELLING OF RIGHT ANKLE: Primary | ICD-10-CM

## 2024-08-07 DIAGNOSIS — M25.471 EFFUSION, RIGHT ANKLE: ICD-10-CM

## 2024-08-07 DIAGNOSIS — M25.571 PAIN AND SWELLING OF RIGHT ANKLE: Primary | ICD-10-CM

## 2024-08-07 DIAGNOSIS — R26.2 DIFFICULTY WALKING: ICD-10-CM

## 2024-08-07 PROCEDURE — 97161 PT EVAL LOW COMPLEX 20 MIN: CPT

## 2024-08-07 PROCEDURE — 97110 THERAPEUTIC EXERCISES: CPT

## 2024-08-07 NOTE — PLAN OF CARE
[facility]- Outpatient Rehabilitation and Therapy [address] [place] [address] [place] [address] office: [phone] fax: [phone]     Physical Therapy Initial Evaluation Certification      Dear Kulwant Carvajal MD,    We had the pleasure of evaluating the following patient for physical therapy services at [facility].  A summary of our findings can be found in the initial assessment below.  This includes our plan of care.  If you have any questions or concerns regarding these findings, please do not hesitate to contact me at the office phone number listed above.  Thank you for the referral.     Physician Signature:_______________________________Date:__________________  By signing above (or electronic signature), therapist’s plan is approved by physician       Physical Therapy: TREATMENT/PROGRESS NOTE   Patient: Diane Lerma (58 y.o. female)   Examination Date: 2024   :  1965 MRN: [id number]   Visit #: 1   Insurance Allowable Auth Needed   TBD [x]Yes    []No    Insurance: Payor: [facility] MEDICARE / Plan: [facility] DUAL ADVANTAGE / Product Type: *No Product type* /   Insurance ID: [id number] - (Medicare Managed)  Secondary Insurance (if applicable): [facility]   Treatment Diagnosis:     ICD-10-CM    1. Pain and swelling of right ankle  M25.571     M25.471       2. Effusion, right ankle  M25.471       3. Difficulty walking  R26.2          Medical Diagnosis:  Sprain of unspecified ligament of right ankle, initial encounter [S93.401A]   Referring Physician: Kulwant Carvajal MD  PCP: Inga Guadarrama MD     Plan of care signed (Y/N):     Date of Patient follow up with Physician:      Plan of Care Report: EVAL today  POC update due: (10 visits /OR AUTH LIMITS, whichever is less)   PN due 2024 or 10 visits  POC due in 24 visits or 90 days Recert                                               Medical

## 2024-08-12 ENCOUNTER — APPOINTMENT (OUTPATIENT)
Dept: PHYSICAL THERAPY | Age: 59
End: 2024-08-12
Payer: MEDICARE

## 2024-08-14 ENCOUNTER — HOSPITAL ENCOUNTER (OUTPATIENT)
Dept: PHYSICAL THERAPY | Age: 59
Setting detail: THERAPIES SERIES
Discharge: HOME | End: 2024-08-14
Payer: MEDICARE

## 2024-08-14 PROCEDURE — 97110 THERAPEUTIC EXERCISES: CPT

## 2024-08-14 PROCEDURE — 97016 VASOPNEUMATIC DEVICE THERAPY: CPT

## 2024-08-14 NOTE — FLOWSHEET NOTE
[facility]- Outpatient Rehabilitation and Therapy [address] [place] [address] [place] [address] office: [phone] fax: [phone]         Physical Therapy: TREATMENT/PROGRESS NOTE   Patient: Diane Lerma (58 y.o. female)   Examination Date: 2024   :  1965 MRN: [id number]   Visit #: 2   Insurance Allowable Auth Needed   4 visits 2024 - 2024 [x]Yes    []No    Insurance: Payor: [employer] MEDICARE / Plan: [employer] DUAL ADVANTAGE / Product Type: *No Product type* /   Insurance ID: [id number] - (Medicare Managed)  Secondary Insurance (if applicable): [employer] [place]   Treatment Diagnosis:     ICD-10-CM    1. Pain and swelling of right ankle  M25.571     M25.471       2. Effusion, right ankle  M25.471       3. Difficulty walking  R26.2          Medical Diagnosis:  Sprain of unspecified ligament of right ankle, initial encounter [S93.401A]   Referring Physician: Kulwant Carvajal MD  PCP: Inga Guadarrama MD   Plan of care signed (Y/N): sent 2x    Date of Patient follow up with Physician:      Plan of Care Report: NO  POC update due: (10 visits /OR AUTH LIMITS, whichever is less)   PN due 2024 or 10 visits  POC due in 24 visits or 90 days Recert                                               Medical History:  Comorbidities:  Osteoarthritis  Anxiety  Depression  Other Cardiovascular Conditions: HLD, Aortic Valve disease  Relevant Medical History:                                          Precautions/ Contra-indications:           Latex allergy:  NO  Pacemaker:    YES  Contraindications for Manipulation: None  Date of Surgery: DOI: 24  Other:    Red Flags:  None    Suicide Screening:   The patient did not verbalize a primary behavioral concern, suicidal ideation, suicidal intent, or demonstrate suicidal behaviors.    Preferred Language for Healthcare:   [x] English       [] other:    SUBJECTIVE EXAMINATION     Patient stated complaint: She has been doing her exercises. Her

## 2024-08-19 ENCOUNTER — APPOINTMENT (OUTPATIENT)
Dept: PHYSICAL THERAPY | Age: 59
End: 2024-08-19
Payer: MEDICARE

## 2024-08-26 ENCOUNTER — APPOINTMENT (OUTPATIENT)
Dept: PHYSICAL THERAPY | Age: 59
End: 2024-08-26
Payer: MEDICARE

## 2024-09-24 ENCOUNTER — OFFICE VISIT (OUTPATIENT)
Dept: ORTHOPEDIC SURGERY | Age: 59
End: 2024-09-24
Payer: MEDICARE

## 2024-09-24 VITALS — BODY MASS INDEX: 23.63 KG/M2 | HEIGHT: 66 IN | WEIGHT: 147 LBS

## 2024-09-24 DIAGNOSIS — M17.11 PRIMARY OSTEOARTHRITIS OF RIGHT KNEE: Primary | ICD-10-CM

## 2024-09-24 DIAGNOSIS — M17.12 PRIMARY OSTEOARTHRITIS OF LEFT KNEE: ICD-10-CM

## 2024-09-24 PROCEDURE — 99213 OFFICE O/P EST LOW 20 MIN: CPT | Performed by: ORTHOPAEDIC SURGERY

## 2024-09-24 PROCEDURE — 20610 DRAIN/INJ JOINT/BURSA W/O US: CPT | Performed by: ORTHOPAEDIC SURGERY

## 2024-09-24 RX ORDER — TRIAMCINOLONE ACETONIDE 40 MG/ML
40 INJECTION, SUSPENSION INTRA-ARTICULAR; INTRAMUSCULAR ONCE
Status: SHIPPED | OUTPATIENT
Start: 2024-09-24

## 2024-09-24 RX ORDER — LIDOCAINE HYDROCHLORIDE 10 MG/ML
4 INJECTION, SOLUTION INFILTRATION; PERINEURAL ONCE
Status: SHIPPED | OUTPATIENT
Start: 2024-09-24

## 2024-12-17 ENCOUNTER — OFFICE VISIT (OUTPATIENT)
Dept: ORTHOPEDIC SURGERY | Age: 59
End: 2024-12-17

## 2024-12-17 ENCOUNTER — PREP FOR PROCEDURE (OUTPATIENT)
Dept: ORTHOPEDIC SURGERY | Age: 59
End: 2024-12-17

## 2024-12-17 ENCOUNTER — TELEPHONE (OUTPATIENT)
Dept: ORTHOPEDIC SURGERY | Age: 59
End: 2024-12-17

## 2024-12-17 VITALS — HEIGHT: 66 IN | WEIGHT: 147 LBS | BODY MASS INDEX: 23.63 KG/M2

## 2024-12-17 DIAGNOSIS — M15.1 DEGENERATIVE ARTHRITIS OF DISTAL INTERPHALANGEAL JOINT OF MIDDLE FINGER OF RIGHT HAND: Primary | ICD-10-CM

## 2024-12-17 DIAGNOSIS — M17.12 PRIMARY OSTEOARTHRITIS OF LEFT KNEE: ICD-10-CM

## 2024-12-17 DIAGNOSIS — M79.641 RIGHT HAND PAIN: ICD-10-CM

## 2024-12-17 DIAGNOSIS — M15.1 DISTAL INTERPHALANGEAL NODULE: ICD-10-CM

## 2024-12-17 DIAGNOSIS — M17.11 PRIMARY OSTEOARTHRITIS OF RIGHT KNEE: ICD-10-CM

## 2024-12-17 RX ORDER — TRIAMCINOLONE ACETONIDE 40 MG/ML
40 INJECTION, SUSPENSION INTRA-ARTICULAR; INTRAMUSCULAR ONCE
Status: COMPLETED | OUTPATIENT
Start: 2024-12-17 | End: 2024-12-17

## 2024-12-17 RX ORDER — LIDOCAINE HYDROCHLORIDE 10 MG/ML
4 INJECTION, SOLUTION INFILTRATION; PERINEURAL ONCE
Status: COMPLETED | OUTPATIENT
Start: 2024-12-17 | End: 2024-12-17

## 2024-12-17 RX ADMIN — TRIAMCINOLONE ACETONIDE 40 MG: 40 INJECTION, SUSPENSION INTRA-ARTICULAR; INTRAMUSCULAR at 10:08

## 2024-12-17 RX ADMIN — LIDOCAINE HYDROCHLORIDE 4 ML: 10 INJECTION, SOLUTION INFILTRATION; PERINEURAL at 10:08

## 2024-12-17 RX ADMIN — LIDOCAINE HYDROCHLORIDE 4 ML: 10 INJECTION, SOLUTION INFILTRATION; PERINEURAL at 10:07

## 2024-12-17 NOTE — TELEPHONE ENCOUNTER
Spoke to Dr Yun office requesting cardiac clearance for surgery on 12-26-24.   P# [phone].

## 2024-12-17 NOTE — PROGRESS NOTES
Knee reflex 1+ bilaterally.     IMAGING:  Xray 3 views of the bilateral knee was obtained 2/6/2024 in the office and reviewed.  These demonstrate mild degenerative changes with narrowing of the joint space in the patellar joint space compartment, subchondral sclerosis, and marginal osteophytosis.       Xray, 3 views left wrist taken 9/1/2021 in the office showed anatomic alignment of left distal radius, plate and screws in good position, no loosening.       X-rays were taken in the office today, 3 views of the right hand, and showed no acute fracture, good alignment of right index finger DIP arthrodesis, IM screw in good position, no loosening. 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    IMPRESSION:     1-Right index finger DIP arthritis, status post arthrodesis with IM screw, 4/6/2023.  2-Bilateral L>R knee pain/ osteoarthritis.  3-Left distal radius 3 parts intra-articular displaced fracture, status post ORIF, 1/4/2021.  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    PLAN:  I have told the patient to work on ROM, as well as strengthening exercises. No heavy impact activities. We recommended Quad exercises and stretching of the calf and hamstrings which was taught to the patient today.  I believe she will benefit from cortisone injection bilateral knees, and she agreed to have.       PROCEDURE:    With the patient's permission, and under sterile condition, the bilateral knee was prepared and draped with alcohol and injected with a mixture of 1 mL Kenalog 40mg and 4 mL of 1% lidocaine through the medial parapatellar port area.  The patient tolerated the procedure well.   A band-aid was applied and the patient was advised to ice the knee for 15-20 minutes to relieve any injection site related pain.    She reported a good improvement immediatly after the injection.    Regarding her right long finger, I believe she will benefit from DIP arthrodesis.    I discussed the risks and benefits of surgery with the

## 2024-12-23 RX ORDER — FUROSEMIDE 20 MG/1
20 TABLET ORAL DAILY
COMMUNITY
Start: 2024-07-15

## 2024-12-24 ENCOUNTER — TELEPHONE (OUTPATIENT)
Dept: ORTHOPEDIC SURGERY | Age: 59
End: 2024-12-24

## 2024-12-24 NOTE — TELEPHONE ENCOUNTER
Advised patient Arrival time is 6:00 AM with a Sx time of 8:00 AM.     Patient agreed and understood.

## 2024-12-24 NOTE — TELEPHONE ENCOUNTER
General Question     Subject: SURGERY DETAILS  Patient and /or Facility Request: Diane Lerma   Contact Number: [phone]     PATIENT REQUESTING A CALL BACK WITH SURGERY DETAILS. PATIENT WOULD LIKE TO KNOW WHAT TIME SHE NEEDS TO ARRIVE FOR HER SURGERY ON 12/26/24    PLEASE CALL PATIENT BACK AT THE ABOVE NUMBER

## 2024-12-26 ENCOUNTER — HOSPITAL ENCOUNTER (OUTPATIENT)
Age: 59
Setting detail: OUTPATIENT SURGERY
Discharge: HOME | End: 2024-12-26
Attending: ORTHOPAEDIC SURGERY | Admitting: ORTHOPAEDIC SURGERY
Payer: MEDICARE

## 2024-12-26 ENCOUNTER — ANESTHESIA EVENT (OUTPATIENT)
Dept: OPERATING ROOM | Age: 59
End: 2024-12-26
Payer: MEDICARE

## 2024-12-26 ENCOUNTER — ANESTHESIA (OUTPATIENT)
Dept: OPERATING ROOM | Age: 59
End: 2024-12-26
Payer: MEDICARE

## 2024-12-26 ENCOUNTER — APPOINTMENT (OUTPATIENT)
Dept: GENERAL RADIOLOGY | Age: 59
End: 2024-12-26
Attending: ORTHOPAEDIC SURGERY
Payer: MEDICARE

## 2024-12-26 VITALS
OXYGEN SATURATION: 100 % | BODY MASS INDEX: 24.11 KG/M2 | SYSTOLIC BLOOD PRESSURE: 110 MMHG | DIASTOLIC BLOOD PRESSURE: 64 MMHG | TEMPERATURE: 97.1 F | WEIGHT: 150 LBS | HEIGHT: 66 IN | RESPIRATION RATE: 10 BRPM | HEART RATE: 84 BPM

## 2024-12-26 PROCEDURE — 3700000000 HC ANESTHESIA ATTENDED CARE: Performed by: ORTHOPAEDIC SURGERY

## 2024-12-26 PROCEDURE — 3600000004 HC SURGERY LEVEL 4 BASE: Performed by: ORTHOPAEDIC SURGERY

## 2024-12-26 PROCEDURE — 2500000003 HC RX 250 WO HCPCS

## 2024-12-26 PROCEDURE — 6360000002 HC RX W HCPCS: Performed by: ORTHOPAEDIC SURGERY

## 2024-12-26 PROCEDURE — 2580000003 HC RX 258: Performed by: ORTHOPAEDIC SURGERY

## 2024-12-26 PROCEDURE — 7100000000 HC PACU RECOVERY - FIRST 15 MIN: Performed by: ORTHOPAEDIC SURGERY

## 2024-12-26 PROCEDURE — 2720000010 HC SURG SUPPLY STERILE: Performed by: ORTHOPAEDIC SURGERY

## 2024-12-26 PROCEDURE — 2500000003 HC RX 250 WO HCPCS: Performed by: ORTHOPAEDIC SURGERY

## 2024-12-26 PROCEDURE — 3700000001 HC ADD 15 MINUTES (ANESTHESIA): Performed by: ORTHOPAEDIC SURGERY

## 2024-12-26 PROCEDURE — 7100000010 HC PHASE II RECOVERY - FIRST 15 MIN: Performed by: ORTHOPAEDIC SURGERY

## 2024-12-26 PROCEDURE — 7100000001 HC PACU RECOVERY - ADDTL 15 MIN: Performed by: ORTHOPAEDIC SURGERY

## 2024-12-26 PROCEDURE — 3600000014 HC SURGERY LEVEL 4 ADDTL 15MIN: Performed by: ORTHOPAEDIC SURGERY

## 2024-12-26 PROCEDURE — C1713 ANCHOR/SCREW BN/BN,TIS/BN: HCPCS | Performed by: ORTHOPAEDIC SURGERY

## 2024-12-26 PROCEDURE — 2709999900 HC NON-CHARGEABLE SUPPLY: Performed by: ORTHOPAEDIC SURGERY

## 2024-12-26 PROCEDURE — 7100000011 HC PHASE II RECOVERY - ADDTL 15 MIN: Performed by: ORTHOPAEDIC SURGERY

## 2024-12-26 PROCEDURE — 6360000002 HC RX W HCPCS

## 2024-12-26 PROCEDURE — 73120 X-RAY EXAM OF HAND: CPT

## 2024-12-26 DEVICE — HEADLESS SCREW
Type: IMPLANTABLE DEVICE | Site: MIDDLE FINGER | Status: FUNCTIONAL
Brand: MINI

## 2024-12-26 RX ORDER — DEXAMETHASONE SODIUM PHOSPHATE 4 MG/ML
INJECTION, SOLUTION INTRA-ARTICULAR; INTRALESIONAL; INTRAMUSCULAR; INTRAVENOUS; SOFT TISSUE
Status: DISCONTINUED | OUTPATIENT
Start: 2024-12-26 | End: 2024-12-26 | Stop reason: SDUPTHER

## 2024-12-26 RX ORDER — NALOXONE HYDROCHLORIDE 0.4 MG/ML
INJECTION, SOLUTION INTRAMUSCULAR; INTRAVENOUS; SUBCUTANEOUS PRN
Status: DISCONTINUED | OUTPATIENT
Start: 2024-12-26 | End: 2024-12-26 | Stop reason: HOSPADM

## 2024-12-26 RX ORDER — CEPHALEXIN 500 MG/1
500 CAPSULE ORAL 4 TIMES DAILY
Qty: 20 CAPSULE | Refills: 0 | Status: SHIPPED | OUTPATIENT
Start: 2024-12-26 | End: 2024-12-31

## 2024-12-26 RX ORDER — FAMOTIDINE 10 MG/ML
INJECTION, SOLUTION INTRAVENOUS
Status: DISCONTINUED | OUTPATIENT
Start: 2024-12-26 | End: 2024-12-26 | Stop reason: SDUPTHER

## 2024-12-26 RX ORDER — HALOPERIDOL 5 MG/ML
1 INJECTION INTRAMUSCULAR
Status: DISCONTINUED | OUTPATIENT
Start: 2024-12-26 | End: 2024-12-26 | Stop reason: HOSPADM

## 2024-12-26 RX ORDER — PROPOFOL 10 MG/ML
INJECTION, EMULSION INTRAVENOUS
Status: DISCONTINUED | OUTPATIENT
Start: 2024-12-26 | End: 2024-12-26 | Stop reason: SDUPTHER

## 2024-12-26 RX ORDER — SODIUM CHLORIDE 9 MG/ML
INJECTION, SOLUTION INTRAVENOUS PRN
Status: DISCONTINUED | OUTPATIENT
Start: 2024-12-26 | End: 2024-12-26 | Stop reason: HOSPADM

## 2024-12-26 RX ORDER — ONDANSETRON 2 MG/ML
INJECTION INTRAMUSCULAR; INTRAVENOUS
Status: DISCONTINUED | OUTPATIENT
Start: 2024-12-26 | End: 2024-12-26 | Stop reason: SDUPTHER

## 2024-12-26 RX ORDER — ONDANSETRON 2 MG/ML
4 INJECTION INTRAMUSCULAR; INTRAVENOUS
Status: DISCONTINUED | OUTPATIENT
Start: 2024-12-26 | End: 2024-12-26 | Stop reason: HOSPADM

## 2024-12-26 RX ORDER — BUPIVACAINE HYDROCHLORIDE 5 MG/ML
INJECTION, SOLUTION EPIDURAL; INTRACAUDAL
Status: COMPLETED | OUTPATIENT
Start: 2024-12-26 | End: 2024-12-26

## 2024-12-26 RX ORDER — OXYCODONE HYDROCHLORIDE 5 MG/1
10 TABLET ORAL PRN
Status: DISCONTINUED | OUTPATIENT
Start: 2024-12-26 | End: 2024-12-26 | Stop reason: HOSPADM

## 2024-12-26 RX ORDER — LIDOCAINE HYDROCHLORIDE 20 MG/ML
INJECTION, SOLUTION EPIDURAL; INFILTRATION; INTRACAUDAL; PERINEURAL
Status: DISCONTINUED | OUTPATIENT
Start: 2024-12-26 | End: 2024-12-26 | Stop reason: SDUPTHER

## 2024-12-26 RX ORDER — SODIUM CHLORIDE 9 MG/ML
INJECTION, SOLUTION INTRAVENOUS CONTINUOUS
Status: DISCONTINUED | OUTPATIENT
Start: 2024-12-26 | End: 2024-12-26 | Stop reason: HOSPADM

## 2024-12-26 RX ORDER — FENTANYL CITRATE 50 UG/ML
INJECTION, SOLUTION INTRAMUSCULAR; INTRAVENOUS
Status: DISCONTINUED | OUTPATIENT
Start: 2024-12-26 | End: 2024-12-26 | Stop reason: SDUPTHER

## 2024-12-26 RX ORDER — MORPHINE SULFATE 10 MG/ML
1 INJECTION, SOLUTION INTRAMUSCULAR; INTRAVENOUS EVERY 5 MIN PRN
Status: DISCONTINUED | OUTPATIENT
Start: 2024-12-26 | End: 2024-12-26 | Stop reason: HOSPADM

## 2024-12-26 RX ORDER — SODIUM CHLORIDE 0.9 % (FLUSH) 0.9 %
5-40 SYRINGE (ML) INJECTION PRN
Status: DISCONTINUED | OUTPATIENT
Start: 2024-12-26 | End: 2024-12-26 | Stop reason: HOSPADM

## 2024-12-26 RX ORDER — PHENYLEPHRINE HCL IN 0.9% NACL 1 MG/10 ML
SYRINGE (ML) INTRAVENOUS
Status: DISCONTINUED | OUTPATIENT
Start: 2024-12-26 | End: 2024-12-26 | Stop reason: SDUPTHER

## 2024-12-26 RX ORDER — OXYCODONE HYDROCHLORIDE 5 MG/1
5 TABLET ORAL PRN
Status: DISCONTINUED | OUTPATIENT
Start: 2024-12-26 | End: 2024-12-26 | Stop reason: HOSPADM

## 2024-12-26 RX ORDER — SODIUM CHLORIDE 0.9 % (FLUSH) 0.9 %
5-40 SYRINGE (ML) INJECTION EVERY 12 HOURS SCHEDULED
Status: DISCONTINUED | OUTPATIENT
Start: 2024-12-26 | End: 2024-12-26 | Stop reason: HOSPADM

## 2024-12-26 RX ADMIN — ONDANSETRON 4 MG: 2 INJECTION INTRAMUSCULAR; INTRAVENOUS at 08:06

## 2024-12-26 RX ADMIN — FENTANYL CITRATE 25 MCG: 50 INJECTION, SOLUTION INTRAMUSCULAR; INTRAVENOUS at 07:59

## 2024-12-26 RX ADMIN — LIDOCAINE HYDROCHLORIDE 100 MG: 20 INJECTION, SOLUTION EPIDURAL; INFILTRATION; INTRACAUDAL; PERINEURAL at 07:59

## 2024-12-26 RX ADMIN — DEXAMETHASONE SODIUM PHOSPHATE 8 MG: 4 INJECTION, SOLUTION INTRAMUSCULAR; INTRAVENOUS at 08:06

## 2024-12-26 RX ADMIN — FAMOTIDINE 20 MG: 10 INJECTION INTRAVENOUS at 08:06

## 2024-12-26 RX ADMIN — FENTANYL CITRATE 75 MCG: 50 INJECTION, SOLUTION INTRAMUSCULAR; INTRAVENOUS at 08:11

## 2024-12-26 RX ADMIN — PROPOFOL 200 MG: 10 INJECTION, EMULSION INTRAVENOUS at 07:59

## 2024-12-26 RX ADMIN — CEFAZOLIN 2000 MG: 2 INJECTION, POWDER, FOR SOLUTION INTRAMUSCULAR; INTRAVENOUS at 08:07

## 2024-12-26 RX ADMIN — FENTANYL CITRATE 50 MCG: 50 INJECTION, SOLUTION INTRAMUSCULAR; INTRAVENOUS at 08:20

## 2024-12-26 RX ADMIN — SODIUM CHLORIDE: 9 INJECTION, SOLUTION INTRAVENOUS at 06:39

## 2024-12-26 RX ADMIN — Medication 100 MCG: at 08:34

## 2024-12-26 ASSESSMENT — PAIN - FUNCTIONAL ASSESSMENT
PAIN_FUNCTIONAL_ASSESSMENT: NONE - DENIES PAIN
PAIN_FUNCTIONAL_ASSESSMENT: 0-10
PAIN_FUNCTIONAL_ASSESSMENT: NONE - DENIES PAIN

## 2024-12-26 ASSESSMENT — ENCOUNTER SYMPTOMS: SHORTNESS OF BREATH: 1

## 2024-12-26 NOTE — BRIEF OP NOTE
Brief Postoperative Note      Patient: Diane Lerma  YOB: 1965  MRN: [id number]    Date of Procedure: 12/26/2024    Pre-Op Diagnosis Codes:      * Distal interphalangeal arthritis right long finger    Post-Op Diagnosis: Same       Procedure(s):  RIGHT MIDDLE DISTAL INTERPHALANGEAL JOINT FUSION    Surgeon(s):  Renita Salas MD    Assistant:  First Assistant: Timo Westfall RN    Anesthesia: General    Estimated Blood Loss (mL): Minimal    Complications: None    Specimens:   * No specimens in log *    Implants:  Implant Name Type Inv. Item Serial No.  Lot No. LRB No. Used Action   SCREW HEADLESS [id number] - [id number]  SCREW HEADLESS 2.5X38MM  [employer]-  Right 1 Implanted         Drains: * No LDAs found *    Findings:  Infection Present At Time Of Surgery (PATOS) (choose all levels that have infection present):  No infection present  Other Findings: Same.    Electronically signed by Renita Salas MD on 12/26/2024 at 4:53 PM

## 2024-12-26 NOTE — ANESTHESIA PRE PROCEDURE
results found for: \"PREGTESTUR\", \"PREGSERUM\", \"HCG\", \"HCGQUANT\"     ABGs:   Lab Results   Component Value Date/Time    PHART 7.501 07/05/2021 02:25 AM    PO2ART 59.1 07/05/2021 02:25 AM    IHM8FTP 35.3 07/05/2021 02:25 AM    HSI4TFA 27.0 07/05/2021 02:25 AM    BEART 3.9 07/05/2021 02:25 AM    T7DEUULF 92.9 07/05/2021 02:25 AM        Type & Screen (If Applicable):  No results found for: \"ABORH\", \"LABANTI\"    Drug/Infectious Status (If Applicable):  No results found for: \"HIV\", \"HEPCAB\"    COVID-19 Screening (If Applicable):   Lab Results   Component Value Date/Time    COVID19 Not Detected 12/11/2021 12:34 PM           Anesthesia Evaluation    Airway: Mallampati: II  TM distance: <3 FB   Neck ROM: full  Mouth opening: > = 3 FB   Dental: normal exam         Pulmonary:normal exam    (+) pneumonia:  COPD:  shortness of breath:   sleep apnea:                                  Cardiovascular:    (+) pacemaker:        Rhythm: regular  Rate: normal                    Neuro/Psych:   (+) neuromuscular disease:, psychiatric history:            GI/Hepatic/Renal:             Endo/Other:                     Abdominal:             Vascular:          Other Findings:             Anesthesia Plan      general     ASA 3       Induction: intravenous.    MIPS: Postoperative opioids intended and Prophylactic antiemetics administered.  Anesthetic plan and risks discussed with patient.      Plan discussed with CRNA.                    Scottie Fuentes MD   12/26/2024

## 2024-12-26 NOTE — ANESTHESIA PRE PROCEDURE
since that time. Battery sufficient with 9 months of live left.      Neuro/Psych:   (+) neuromuscular disease:, psychiatric history:            GI/Hepatic/Renal:             Endo/Other:                     Abdominal:             Vascular:          Other Findings:             Anesthesia Plan      general         Induction: intravenous.    MIPS: Postoperative opioids intended and Prophylactic antiemetics administered.  Anesthetic plan and risks discussed with patient.      Plan discussed with CRNA.                    Scottie Fuentes MD   12/26/2024

## 2024-12-26 NOTE — H&P
Preoperative H&P Update    The patient's History and Physical in the medical record from 12/17/2024 was reviewed by me today.    Past Medical History:   Diagnosis Date    Anxiety and depression     Aortic valve disease 2016    Arthritis     Blood clot in vein 2017    inR side of neck, from IV    COPD (chronic obstructive pulmonary disease) (HCC)     RAYNA ugaldeID-19 06/30/2021    History of blood transfusion     Hyperlipidemia     Pacemaker     Medtronic     Past Surgical History:   Procedure Laterality Date    BACK SURGERY      rods/screw L4,5,6/ DJD    CARDIAC SURGERY  2017    aortic valve replacement and hole in heart    CARDIAC SURGERY  10/10/2023    Transcatheter Mitral Valve Replacement using Transesophageal Echocardiogram/ Valve in Valve performed by Jabier Pitts MD    FOREARM SURGERY Left 01/04/2021    OPEN REDUCTION INTERNAL FIXATION LEFT DISTAL RADIUS performed by Renita Salas MD at [facility]    HAND SURGERY Right 04/06/2023    RIGHT INDEX DISTAL INTERPHALANGEAL JOINT FUSION performed by Renita Salas MD at [facility] OR    NECK SURGERY      screws and pins, posterior fusion    PACEMAKER PLACEMENT  2017    medtronic model # [id number]    PAIN MANAGEMENT PROCEDURE Right 08/11/2022    RIGHT SACRAL ONE TRANSFORAMINAL EPIDURAL STEROID INJECTION SITE CONFIRMED BY FLUOROSCOPY performed by Liam Terry MD at [facility] OR    SINUS SURGERY       No current facility-administered medications on file prior to encounter.     Current Outpatient Medications on File Prior to Encounter   Medication Sig Dispense Refill    furosemide (LASIX) 20 MG tablet Take 1 tablet by mouth daily      acetaminophen (TYLENOL) 500 MG tablet Take 1 tablet by mouth every 6 hours as needed for Pain 30 tablet 0    docusate (COLACE, DULCOLAX) 100 MG CAPS Take 200 mg by mouth in the morning and 200 mg before bedtime.      tiotropium (SPIRIVA RESPIMAT) 2.5 MCG/ACT AERS inhaler Inhale 1 puff into the lungs 2 times daily

## 2024-12-26 NOTE — PROGRESS NOTES
Cardiac clearance obtained from Dr. Yun. Called patient to confirm. She stated she still did not have direction concerning when to hold eliquis. Called Dr. Yun's office and spoke with Pamela. She stated that the direction for holding the Eliquis would come from Dr. Salas's office. TEMAS message to Shelley at Dr. Salas's office and she stated that he would want it held for 1-2 days prior to surgery. Called patient and discussed with her. She stated she would hold today and tomorrow and would resume per Dr. Salas's order, potentially on Thursday post op.  
Teaching / education initiated regarding perioperative experience, expectations, and pain management during stay. Patient verbalized understanding.  
cases.  Leave your hair down-no buns,ponytails or metal hair accessories.  NO nail polish -if you have artificial or gel nails check with your surgeon.   NO makeup especially eye makeup.  Do not smoke,drink alcohol or use recreational drugs 24 hours prior to surgery.  Bring a copy of your Living Will or Durable Power of  the day of surgery.  If you are staying overnight bring your cpap or bipap but leave your personnel belongings in the car until you are assigned a room.  If you develop any illness prior to surgery let your surgeon know (fever,cough ,cold sore throat,nausea or vomiting).  If you have any skin breakdown,signs of infection or dental issues, notify your surgeon.  Make sure your voice mail is set up and not full so you are able to receive messages regarding your surgery.  Visitor policies are subject to change- check with your care team.   Masking is at the discretion of the facility.  Other ________________________________________________________________________________________________________      For any questions call Pre-Admission testing at  [phone]   Fax  [phone]    All of the above information was  reviewed with patient/caregiver and they verbalize understanding.    Patient/Representative per phone _patient__  Patient not reached instructions left on voicemail ___ Office notified unable to reach _____  Above instructions sent to patients MY Chart ___ Do not communicate with the PAT DEPT via [employer].Call [phone] for questions or call your surgeon

## 2024-12-26 NOTE — ANESTHESIA POSTPROCEDURE EVALUATION
Department of Anesthesiology  Postprocedure Note    Patient: Diane Lerma  MRN: [id number]  YOB: 1965  Date of evaluation: 12/26/2024    Procedure Summary       Date: 12/26/24 Room / Location: [address]    Anesthesia Start: 0753 Anesthesia Stop: 0912    Procedure: RIGHT MIDDLE DISTAL INTERPHALANGEAL JOINT FUSION (Right) Diagnosis:       Distal interphalangeal nodule      (Distal interphalangeal nodule [M15.1])    Surgeons: Renita Salas MD Responsible Provider: Scottie Fuentes MD    Anesthesia Type: general ASA Status: Not recorded            Anesthesia Type: No value filed.    Layla Phase I: Layla Score: 10    Layla Phase II:      Anesthesia Post Evaluation    Patient location during evaluation: bedside  Patient participation: complete - patient participated  Level of consciousness: awake  Pain score: 0  Airway patency: patent  Nausea & Vomiting: no nausea  Cardiovascular status: blood pressure returned to baseline  Respiratory status: acceptable  Hydration status: euvolemic  Pain management: adequate    No notable events documented.

## 2024-12-26 NOTE — DISCHARGE INSTRUCTIONS
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   3- NWB right long finger  4- Elevation surgical site, with ice  5- Keep Drsg dry and clean, 3 days, then BandAid   6- F/U in 2 weeks.       Renita Salas MD, 12/26/2024        ORTHOPEDIC/PODIATRY DISCHARGE INSTRUCTIONS    Follow your surgeons instructions.  Make follow-up appointment.  Observe operative area for signs of excessive bleeding such as a slow general ooze that saturates the dressing or bright red bleeding. In either case, apply pressure to the area and elevate if possible and call your surgeon right away.  Observe the affected extremity for circulation or nerve impairment such as a change in color, numbness, tingling, coldness or increased pain. If any of these symptoms are present call your surgeon.  Observe operative site for any signs of infection such as increased pain, redness, fever greater than 101 degrees, swelling, foul odor or drainage. Contact surgeon if any of these symptoms are present.  If you become short of breath call your surgeon or go to the nearest emergency room.  Remove dressing if directed by surgeon. Leave steristips or sutures or staples in place.  Elevate extremity as directed by surgeon.  You may shower when directed by surgeon.  Use ice pack as directed by surgeon. Do not use heat.  Avoid stress to suture line such as pulling, pushing or tugging.  Take medications as ordered.Take pain medication with food.Do not drive or operate machinery while taking narcotics.  Call your surgeon for any questions or problems.       ANESTHESIA DISCHARGE INSTRUCTIONS    Wear your seatbelt home.  You are under the influence of drugs-do not drink alcohol,drive,operate machinery,or make any important decisions or sign any legal documentsfor 24 hours  A responsible adult needs to be with you for 24 hours.  You may experience lightheadedness,dizziness,or sleepiness following surgery.  Rest at home today-

## 2024-12-27 NOTE — OP NOTE
[address] [address]                            OPERATIVE REPORT      PATIENT NAME: TEE BAIG             : 1965  MED REC NO: [id number]                      ROOM: ASC OR  ACCOUNT NO: [id number]                       ADMIT DATE: 2024  PROVIDER: Renita Salas MD      DATE OF PROCEDURE:  2024    SURGEON:  Renita Salas MD    ASSISTANT:  Timo surgical assistant.    PREOPERATIVE DIAGNOSIS:  Right hand long finger distal interphalangeal joint arthritis with deformity.    POSTOPERATIVE DIAGNOSIS:  Right hand long finger distal interphalangeal joint arthritis with deformity.    PROCEDURE DONE:  Right hand distal interphalangeal joint arthrodesis.    ANESTHESIA:  General anesthesia.    ESTIMATED BLOOD LOSS:  Minimal.    COMPLICATIONS:  None.    TOURNIQUET:  Right upper arm 250 mmHg.    IMPLANT USED:  [employer] Tritanium Instratek 2.5 headless compression screw size 38 mm.    INDICATION:  This is a 59-year-old white female, left-hand dominant, well known to me where she had a previous right index finger DIP fusion a few years ago.  She would like to proceed with the long finger as she has significant pain and deformity of the DIP joint of the long finger.  All risks, benefits, and alternatives discussed with the patient and she agreed to proceed with surgical treatment with the arthrodesis of left long PIP joint.    DESCRIPTION OF PROCEDURE:  The patient's right hand was marked.  She received 2 g Ancef IV preoperatively.  The patient was then brought to the operating room, underwent general anesthesia.  A well-padded tourniquet was placed to right upper arm.  The right upper extremity was then prepped and draped in regular sterile routine fashion.  A time-out was called confirming the patient's name, site, and procedure.    Esmarch was used for exsanguination, tourniquet was inflated to 250 mmHg.  Incision was made

## 2025-01-03 ENCOUNTER — TELEPHONE (OUTPATIENT)
Dept: ORTHOPEDIC SURGERY | Age: 60
End: 2025-01-03

## 2025-01-03 NOTE — TELEPHONE ENCOUNTER
General Question     Subject: patient is calling to see if she can get some questions answered about her hand. She just need a call back.   Patient Diane Lerma   Contact Number: [phone]

## 2025-01-03 NOTE — TELEPHONE ENCOUNTER
Patient called concerned that she would not be able to make it  to her PO apt scheduled on 1/7/25 due to possible bad weather. Patient asked if her \"nurse friend\" could remove her sutures for her if she could not make her apt? Patient was advised that her friend should not remove her sutures.  Apt was RS to 1/9/25 at FF at 3:30 PM.

## 2025-01-21 ENCOUNTER — OFFICE VISIT (OUTPATIENT)
Dept: ORTHOPEDIC SURGERY | Age: 60
End: 2025-01-21

## 2025-01-21 DIAGNOSIS — M15.1 DISTAL INTERPHALANGEAL NODULE: Primary | ICD-10-CM

## 2025-01-25 NOTE — PROGRESS NOTES
DIAGNOSIS:  Right long finger DIP arthritis, status post arthrodesis with IM screw.    DATE OF SURGERY:  4/6/2023.    HISTORY OF PRESENT ILLNESS:  Ms. Lerma 59 y.o.  female right handed returns today for 2 weeks postoperative visit.  The patient denies any significant pain in the right hand, 0/10.  No numbness or tingling sensation. No fever or Chills.      PHYSICAL EXAMINATION:  The incision is healing well right long finger.  No signs of any erythema or drainage. She has no pain with the active or passive range of motion of the right hand long finger, but decrease ROM.  She  has intact sensation, distally, and she  is neurovascularly intact.    IMAGING:  Three views right hand showed good alignment of right long finger DIP arthrodesis, IM screw in good position, no loosening.      IMPRESSION:  Right long finger DIP arthritis, status post arthrodesis with IM screw, and doing very well.    PLAN:  I have told the patient to work on ROM, as well as strengthening exercises. No heavy impact activities. The patient will come back for a follow up in 6 weeks.  At that time, we will take 3 views of the right hand. PT if needed then.      Renita Salas MD

## 2025-03-18 ENCOUNTER — OFFICE VISIT (OUTPATIENT)
Dept: ORTHOPEDIC SURGERY | Age: 60
End: 2025-03-18

## 2025-03-18 DIAGNOSIS — M15.1 DEGENERATIVE ARTHRITIS OF DISTAL INTERPHALANGEAL JOINT OF MIDDLE FINGER OF RIGHT HAND: Primary | ICD-10-CM

## 2025-03-18 DIAGNOSIS — M17.12 PRIMARY OSTEOARTHRITIS OF LEFT KNEE: ICD-10-CM

## 2025-03-18 DIAGNOSIS — M17.11 PRIMARY OSTEOARTHRITIS OF RIGHT KNEE: ICD-10-CM

## 2025-03-18 RX ORDER — TRIAMCINOLONE ACETONIDE 40 MG/ML
40 INJECTION, SUSPENSION INTRA-ARTICULAR; INTRAMUSCULAR ONCE
Status: COMPLETED | OUTPATIENT
Start: 2025-03-18 | End: 2025-03-18

## 2025-03-18 RX ORDER — LIDOCAINE HYDROCHLORIDE 10 MG/ML
4 INJECTION, SOLUTION INFILTRATION; PERINEURAL ONCE
Status: COMPLETED | OUTPATIENT
Start: 2025-03-18 | End: 2025-03-18

## 2025-03-18 RX ADMIN — LIDOCAINE HYDROCHLORIDE 4 ML: 10 INJECTION, SOLUTION INFILTRATION; PERINEURAL at 09:38

## 2025-03-18 RX ADMIN — TRIAMCINOLONE ACETONIDE 40 MG: 40 INJECTION, SUSPENSION INTRA-ARTICULAR; INTRAMUSCULAR at 09:38

## 2025-03-18 RX ADMIN — LIDOCAINE HYDROCHLORIDE 4 ML: 10 INJECTION, SOLUTION INFILTRATION; PERINEURAL at 09:37

## 2025-05-14 NOTE — PROGRESS NOTES
Internal Medicine Progress Note      Interval history:   Admitted with COVID-19 infection. Patient has not taken vaccination. Transferred to ICU for worsening respiratory failure.   -placed on Vapotherm. 40L -> 30 L   Has been on Vapotherm for several days, hypoxemia slowly improved. Placed on high flow oxygen per nasal cannula on . Hypoxemia continues to improve  Appears very comfortable, sitting up on the chair. And in no distress. .  Currently on high flow oxygen per nasal cannula 14 L-> 12 L . Stable on 12 L. Plan is to transfer out of ICU today    Invasive Lines: PIV      MV:  n/a    No results for input(s): PHART, XDU5FBC, PO2ART in the last 72 hours. MV Settings:     / / /FiO2 : 70 %    IV:   sodium chloride 100 mL (21 1404)       Vitals:  Temp  Av.9 °F (36.6 °C)  Min: 97.6 °F (36.4 °C)  Max: 98 °F (36.7 °C)  Pulse  Av.5  Min: 66  Max: 78  BP  Min: 106/72  Max: 144/86  SpO2  Av.6 %  Min: 91 %  Max: 99 %  Patient Vitals for the past 4 hrs:   BP Temp Temp src Pulse Resp SpO2   21 1400 109/73 -- -- 74 14 92 %   21 1300 109/70 -- -- 78 -- 94 %   21 1200 119/81 -- -- 77 16 96 %   21 1126 -- 97.6 °F (36.4 °C) Oral -- -- --       CVP:        Intake/Output Summary (Last 24 hours) at 2021 1501  Last data filed at 2021 1200  Gross per 24 hour   Intake 1360 ml   Output 925 ml   Net 435 ml       EXAM:  Patient seen in droplet plus precautions  General: Sitting up in the chair, no distress, awake alert and well-oriented  Eyes: PERRL. No sclera icterus. No conjunctiva injected. ENT: No discharge. Pharynx clear. Neck: Trachea midline. Normal thyroid. Resp: no  accessory muscle use. Diminished breath sounds no wheezing. No rhonchi. No crackles . CV: Regular rate. Regular rhythm. ESM aortic area no rub. No JVD. Palpable pedal pulses. GI: Non-tender. Non-distended. No masses. No organmegaly. Normal bowel sounds. No hernia. Skin: Warm and dry.  No well developed, well nourished , in no acute distress , ambulating without difficulty , normal communication ability nodule on exposed extremities. No rash on exposed extremities. Lymph: No cervical LAD. No supraclavicular LAD. M/S: No cyanosis. No joint deformity. No clubbing. Neuro: Awake. Follows commands. Positive pupils/gag/corneals. Normal pain response. Psych: Oriented to person, place, time. No anxiety or agitation. Medications:  Scheduled Meds:   sennosides-docusate sodium  2 tablet Oral BID    aspirin  81 mg Oral Daily    mupirocin   Nasal BID    enoxaparin  30 mg Subcutaneous BID    dexamethasone  6 mg Intravenous Q24H    Vitamin D  2,000 Units Oral Daily    citalopram  10 mg Oral Daily    therapeutic multivitamin-minerals  1 tablet Oral Daily    sodium chloride flush  5-40 mL Intravenous 2 times per day       PRN Meds:  oxyCODONE-acetaminophen, sodium chloride flush, sodium chloride, ondansetron **OR** ondansetron, polyethylene glycol, acetaminophen **OR** acetaminophen, guaiFENesin-dextromethorphan, sodium chloride    Results:  CBC:   Recent Labs     07/07/21 0433 07/08/21 0422 07/09/21  0524   WBC 8.5 9.8 11.6*   HGB 10.8* 11.4* 12.2   HCT 31.1* 32.8* 36.5   MCV 88.5 89.2 89.7   * 444 468*     BMP:   Recent Labs     07/07/21 0432 07/08/21 0422 07/09/21  0524    137 135*   K 3.6 4.3 4.2   CL 99 98* 96*   CO2 27 28 28   BUN 20 19 23*   CREATININE <0.5* <0.5* 0.6     LIVER PROFILE:   Recent Labs     07/07/21 0432 07/08/21 0422 07/09/21  0524   AST 34 48* 56*   ALT 34 45* 61*   BILIDIR <0.2 <0.2 <0.2   BILITOT 0.4 0.4 0.5   ALKPHOS 116 113 107       Cultures:  COVID 19 detected    Films:    VL Extremity Venous Bilateral   Final Result      XR CHEST PORTABLE   Final Result   Stable chest.         XR CHEST (2 VW)   Final Result   Bilateral evolving atypical viral pneumonitis.                 Assessment:    Principal Problem:    Acute hypoxemic respiratory failure due to COVID-19 [facility])  Active Problems:    Heart valve replaced    Hypokalemia    PAF (paroxysmal atrial fibrillation) ([facility] [address]    Pneumonia due to COVID-19 virus    Leg edema, left    Acute respiratory failure with hypoxia (HCC)    Electrolyte disorder    Transaminitis  Resolved Problems:    * No resolved hospital problems. *         Plan:    Acute respiratory failure due to Covid 19 infection  -oxygen saturation was in the 80s at home. 87% of the time of admission in the ER. COVID 19 infection with viral pneumonia   - She has not been vaccinated.    - sent to ICU for worsening respiratory failure. - has symptoms of cough, SOB, low grade fevers, poor appetite, diarrhea.  - CXR with evolving viral pneumonitis  - pulmonology consulted. - Received Tocilizumab  - Started on Remdesevir - > completed 5 days   Monitor LFT and renal function.  -Continue Decadron day #7  -Lasix as needed  -Lovenox twice daily  -Patient has been on high flow oxygen per Vapotherm for several days, hypoxemia slowly improving daily   FiO2 is down from 75% to 65% to 55 %,  O2 40 L--> 30 L. Currently weaned down to high flow oxygen per nasal cannula 14 L-> 12L . Will transfer out of ICU today    Hypokalemia  - replaced     LLE swelling  - checked venous doppler-negative for DVT .     Paroxysmal atrial fibrillation  Cardiac pacemaker in place  Status post mitral valve replacement, bioprosthetic  Mild Aortic stenosis  - currently paced rhythm  - she takes no cardiac meds, no AC  - f/w Dr Wil Pradhan at [facility]  - no acute issues noted      COPD  - no AE     Depression  - continue celexa      DVT Prophylaxis: Lovenox  Diet: ADULT DIET;  Regular  Code Status: Full Code         Cece Hoffman MD 3:01 PM 7/9/2021

## 2025-06-15 ENCOUNTER — HOSPITAL ENCOUNTER (EMERGENCY)
Age: 60
Discharge: HOME | End: 2025-06-15
Attending: EMERGENCY MEDICINE
Payer: MEDICARE

## 2025-06-15 ENCOUNTER — APPOINTMENT (OUTPATIENT)
Dept: CT IMAGING | Age: 60
End: 2025-06-15
Payer: MEDICARE

## 2025-06-15 VITALS
SYSTOLIC BLOOD PRESSURE: 127 MMHG | BODY MASS INDEX: 24.11 KG/M2 | DIASTOLIC BLOOD PRESSURE: 82 MMHG | RESPIRATION RATE: 16 BRPM | WEIGHT: 150 LBS | HEART RATE: 82 BPM | HEIGHT: 66 IN | TEMPERATURE: 98 F | OXYGEN SATURATION: 99 %

## 2025-06-15 DIAGNOSIS — M43.6 TORTICOLLIS: Primary | ICD-10-CM

## 2025-06-15 LAB
ANION GAP SERPL CALCULATED.3IONS-SCNC: 11 MMOL/L (ref 3–16)
BASOPHILS # BLD: 0 K/UL (ref 0–0.2)
BASOPHILS NFR BLD: 0.6 %
BUN SERPL-MCNC: 12 MG/DL (ref 7–20)
CALCIUM SERPL-MCNC: 9.7 MG/DL (ref 8.3–10.6)
CHLORIDE SERPL-SCNC: 101 MMOL/L (ref 99–110)
CO2 SERPL-SCNC: 27 MMOL/L (ref 21–32)
CREAT SERPL-MCNC: 0.7 MG/DL (ref 0.6–1.1)
DEPRECATED RDW RBC AUTO: 14.6 % (ref 12.4–15.4)
EOSINOPHIL # BLD: 0 K/UL (ref 0–0.6)
EOSINOPHIL NFR BLD: 0.2 %
GFR SERPLBLD CREATININE-BSD FMLA CKD-EPI: >90 ML/MIN/{1.73_M2}
GLUCOSE SERPL-MCNC: 88 MG/DL (ref 70–99)
HCT VFR BLD AUTO: 38 % (ref 36–48)
HGB BLD-MCNC: 12.5 G/DL (ref 12–16)
LYMPHOCYTES # BLD: 1 K/UL (ref 1–5.1)
LYMPHOCYTES NFR BLD: 14.5 %
MCH RBC QN AUTO: 29.4 PG (ref 26–34)
MCHC RBC AUTO-ENTMCNC: 33 G/DL (ref 31–36)
MCV RBC AUTO: 88.9 FL (ref 80–100)
MONOCYTES # BLD: 0.3 K/UL (ref 0–1.3)
MONOCYTES NFR BLD: 4 %
NEUTROPHILS # BLD: 5.7 K/UL (ref 1.7–7.7)
NEUTROPHILS NFR BLD: 80.7 %
PLATELET # BLD AUTO: 249 K/UL (ref 135–450)
PMV BLD AUTO: 6.7 FL (ref 5–10.5)
POTASSIUM SERPL-SCNC: 3.8 MMOL/L (ref 3.5–5.1)
RBC # BLD AUTO: 4.27 M/UL (ref 4–5.2)
SODIUM SERPL-SCNC: 139 MMOL/L (ref 136–145)
WBC # BLD AUTO: 7.1 K/UL (ref 4–11)

## 2025-06-15 PROCEDURE — 99285 EMERGENCY DEPT VISIT HI MDM: CPT

## 2025-06-15 PROCEDURE — 85025 COMPLETE CBC W/AUTO DIFF WBC: CPT

## 2025-06-15 PROCEDURE — 6360000004 HC RX CONTRAST MEDICATION: Performed by: EMERGENCY MEDICINE

## 2025-06-15 PROCEDURE — 70498 CT ANGIOGRAPHY NECK: CPT

## 2025-06-15 PROCEDURE — 6370000000 HC RX 637 (ALT 250 FOR IP): Performed by: EMERGENCY MEDICINE

## 2025-06-15 PROCEDURE — 36415 COLL VENOUS BLD VENIPUNCTURE: CPT

## 2025-06-15 PROCEDURE — 80048 BASIC METABOLIC PNL TOTAL CA: CPT

## 2025-06-15 RX ORDER — LIDOCAINE 50 MG/G
1 PATCH TOPICAL DAILY
Qty: 10 PATCH | Refills: 0 | Status: SHIPPED | OUTPATIENT
Start: 2025-06-15 | End: 2025-06-25

## 2025-06-15 RX ORDER — LIDOCAINE 4 G/G
1 PATCH TOPICAL ONCE
Status: DISCONTINUED | OUTPATIENT
Start: 2025-06-15 | End: 2025-06-15 | Stop reason: HOSPADM

## 2025-06-15 RX ORDER — ORPHENADRINE CITRATE 100 MG/1
100 TABLET ORAL 2 TIMES DAILY
Qty: 20 TABLET | Refills: 0 | Status: SHIPPED | OUTPATIENT
Start: 2025-06-15 | End: 2025-06-25

## 2025-06-15 RX ORDER — IOPAMIDOL 755 MG/ML
75 INJECTION, SOLUTION INTRAVASCULAR
Status: COMPLETED | OUTPATIENT
Start: 2025-06-15 | End: 2025-06-15

## 2025-06-15 RX ORDER — ACETAMINOPHEN 500 MG
1000 TABLET ORAL ONCE
Status: COMPLETED | OUTPATIENT
Start: 2025-06-15 | End: 2025-06-15

## 2025-06-15 RX ADMIN — ACETAMINOPHEN 1000 MG: 500 TABLET ORAL at 09:35

## 2025-06-15 RX ADMIN — IOPAMIDOL 75 ML: 755 INJECTION, SOLUTION INTRAVENOUS at 09:55

## 2025-06-15 ASSESSMENT — PAIN DESCRIPTION - DESCRIPTORS: DESCRIPTORS: SORE

## 2025-06-15 ASSESSMENT — PAIN DESCRIPTION - ORIENTATION: ORIENTATION: RIGHT

## 2025-06-15 ASSESSMENT — PAIN DESCRIPTION - ONSET: ONSET: ON-GOING

## 2025-06-15 ASSESSMENT — PAIN DESCRIPTION - FREQUENCY: FREQUENCY: CONTINUOUS

## 2025-06-15 ASSESSMENT — PAIN - FUNCTIONAL ASSESSMENT: PAIN_FUNCTIONAL_ASSESSMENT: 0-10

## 2025-06-15 ASSESSMENT — PAIN SCALES - GENERAL: PAINLEVEL_OUTOF10: 10

## 2025-06-15 ASSESSMENT — PAIN DESCRIPTION - PAIN TYPE: TYPE: ACUTE PAIN

## 2025-06-15 ASSESSMENT — PAIN DESCRIPTION - LOCATION: LOCATION: NECK;SHOULDER

## 2025-06-15 NOTE — ED PROVIDER NOTES
[facility] EMERGENCY DEPARTMENT  EMERGENCY DEPARTMENT ENCOUNTER        Pt Name: Diane Lerma  MRN: [id number]  Birthdate 1965  Date of evaluation: 6/15/2025  Provider: Adeola Carreno MD  PCP: Inga Guadarrama MD  Note Started: 9:31 AM EDT 6/15/25    CHIEF COMPLAINT       Chief Complaint   Patient presents with    Neck Pain       HISTORY OF PRESENT ILLNESS: 1 or more Elements     History from : Patient    Limitations to history : None    Diane Lerma is a 59 y.o. female who presents to the emergency department with right sided neck pain.  This has been constant for the last 2 weeks.  She states she takes Percocet daily for pain and has not really helped.  She has tried ice and heat as well.  She was seen about a week ago at a [facility] clinic and diagnosed with sinus infection and ear infection.  She states she has 1 pill left of the antibiotic.  She states the ear infection was on her right side as well.  She states she does not recall any trauma or injury.  She denies chest pain or shortness of breath.  She denies midline neck pain.  This pain does not radiate up into her face or her head and she does not have a headache.  She denies blurry vision.  She states sometimes it seems to radiate down her right arm.  She denies numbness tingling weakness in her right arm or her left arm    Nursing Notes were all reviewed and agreed with or any disagreements were addressed in the HPI.    REVIEW OF SYSTEMS :      Review of Systems    10 systems reviewed and negative except as in HPI/MDM    SURGICAL HISTORY     Past Surgical History:   Procedure Laterality Date    BACK SURGERY      rods/screw L4,5,6/ DJD    CARDIAC SURGERY  2017    aortic valve replacement and hole in heart    CARDIAC SURGERY  10/10/2023    Transcatheter Mitral Valve Replacement using Transesophageal Echocardiogram/ Valve in Valve performed by Jabier Pitts MD    FOREARM SURGERY Left 01/04/2021    OPEN REDUCTION INTERNAL

## 2025-07-10 ENCOUNTER — TELEPHONE (OUTPATIENT)
Dept: ORTHOPEDIC SURGERY | Age: 60
End: 2025-07-10

## 2025-07-10 NOTE — TELEPHONE ENCOUNTER
----- Message from Michael DOMÍNGUEZ sent at 7/10/2025  3:12 PM EDT -----  Regarding: Specialist Appointment Request - Established  Specialist Appointment Request - Established    What Specialty? Select Speciality: Orthopedics     Type of Appointment: Appointment Type: Injection    Reason for appointment?  IMTIAZ KNEES    Scheduling Preference per Patient: Date, Time, Provider:         --------------------------------------------------------------------------------------------------------------------------    Relationship to Patient: Self  Call Back Information: OK to leave message on voicemail  Preferred Call Back Number: Phone [phone]

## 2025-08-19 ENCOUNTER — OFFICE VISIT (OUTPATIENT)
Dept: ORTHOPEDIC SURGERY | Age: 60
End: 2025-08-19

## 2025-08-19 VITALS — BODY MASS INDEX: 24.11 KG/M2 | WEIGHT: 150 LBS | HEIGHT: 66 IN

## 2025-08-19 DIAGNOSIS — M17.12 PRIMARY OSTEOARTHRITIS OF LEFT KNEE: ICD-10-CM

## 2025-08-19 DIAGNOSIS — M19.041 ARTHRITIS OF FINGER OF RIGHT HAND: Primary | ICD-10-CM

## 2025-08-19 DIAGNOSIS — M17.11 PRIMARY OSTEOARTHRITIS OF RIGHT KNEE: ICD-10-CM

## 2025-08-19 RX ORDER — TRIAMCINOLONE ACETONIDE 40 MG/ML
40 INJECTION, SUSPENSION INTRA-ARTICULAR; INTRAMUSCULAR ONCE
Status: COMPLETED | OUTPATIENT
Start: 2025-08-19 | End: 2025-08-19

## 2025-08-19 RX ORDER — LIDOCAINE HYDROCHLORIDE 10 MG/ML
4 INJECTION, SOLUTION INFILTRATION; PERINEURAL ONCE
Status: COMPLETED | OUTPATIENT
Start: 2025-08-19 | End: 2025-08-19

## 2025-08-19 RX ADMIN — LIDOCAINE HYDROCHLORIDE 4 ML: 10 INJECTION, SOLUTION INFILTRATION; PERINEURAL at 09:34

## 2025-08-19 RX ADMIN — TRIAMCINOLONE ACETONIDE 40 MG: 40 INJECTION, SUSPENSION INTRA-ARTICULAR; INTRAMUSCULAR at 09:35

## (undated) DEVICE — C-ARM: Brand: UNBRANDED

## (undated) DEVICE — SOLUTION IRRIG 500ML 0.9% SOD CHLO USP POUR PLAS BTL

## (undated) DEVICE — 2.5MM DEPTH GAUGE/ COUNTERSINK: Brand: MINI

## (undated) DEVICE — GAUZE,SPONGE,4"X4",16PLY,STRL,LF,10/TRAY: Brand: MEDLINE

## (undated) DEVICE — SUTURE VCRL SZ 3-0 L18IN ABSRB UD L26MM SH 1/2 CIR J864D

## (undated) DEVICE — STRIP,CLOSURE,WOUND,MEDI-STRIP,1/2X4: Brand: MEDLINE

## (undated) DEVICE — INTENDED FOR TISSUE SEPARATION, AND OTHER PROCEDURES THAT REQUIRE A SHARP SURGICAL BLADE TO PUNCTURE OR CUT.: Brand: BARD-PARKER ® STAINLESS STEEL BLADES

## (undated) DEVICE — GAUZE,SPONGE,4"X4",8PLY,STRL,LF,10/TRAY: Brand: MEDLINE

## (undated) DEVICE — CANISTER, RIGID, 1200CC: Brand: MEDLINE INDUSTRIES, INC.

## (undated) DEVICE — GLOVE ORTHO 8   MSG9480

## (undated) DEVICE — APPLICATOR PREP 26ML 0.7% IOD POVACRYLEX 74% ISO ALC ST

## (undated) DEVICE — DRAPE HND W114XL142IN BLU POLYPR W O PCH FEN CRD AND TB HLDR

## (undated) DEVICE — COVER LT HNDL BLU PLAS

## (undated) DEVICE — 3M™ STERI-STRIP™ COMPOUND BENZOIN TINCTURE 40 BAGS/CARTON 4 CARTONS/CASE C1544: Brand: 3M™ STERI-STRIP™

## (undated) DEVICE — PADDING UNDERCAST W4INXL4YD 100% COT CRIMPED FINISH WBRL II

## (undated) DEVICE — SPONGE GZ W4XL4IN COT 12 PLY TYP VII WVN C FLD DSGN

## (undated) DEVICE — 3M™ COBAN™ NL STERILE NON-LATEX SELF-ADHERENT WRAP, 2083S, 3 IN X 5 YD (7,5 CM X 4,5 M), 24 ROLLS/CASE: Brand: 3M™ COBAN™

## (undated) DEVICE — NON-THREADED KWIRE
Type: IMPLANTABLE DEVICE | Site: MIDDLE FINGER | Status: NON-FUNCTIONAL
Brand: MINI
Removed: 2024-12-26

## (undated) DEVICE — HYPODERMIC SAFETY NEEDLE: Brand: MAGELLAN

## (undated) DEVICE — TOWEL OR BLUEE 16X26IN ST 8 PACK ORB08 16X26ORTWL

## (undated) DEVICE — SOLUTION IV IRRIG POUR BRL 0.9% SODIUM CHL 2F7124

## (undated) DEVICE — CANNULATED DRILL BIT: Brand: MINI

## (undated) DEVICE — SYRINGE, LUER LOCK, 10ML: Brand: MEDLINE

## (undated) DEVICE — GLOVE SURG SZ 8 CRM LTX FREE POLYISOPRENE POLYMER BEAD ANTI

## (undated) DEVICE — GLOVE ORANGE PI 8   MSG9080

## (undated) DEVICE — UNIVERSAL BLOCK TRAY: Brand: MEDLINE INDUSTRIES, INC.

## (undated) DEVICE — APPLICATOR MEDICATED 26 CC SOLUTION HI LT ORNG CHLORAPREP

## (undated) DEVICE — SHEET,DRAPE,53X77,STERILE: Brand: MEDLINE

## (undated) DEVICE — KIRSCHNER WIRE
Type: IMPLANTABLE DEVICE | Site: WRIST | Status: NON-FUNCTIONAL
Brand: VARIAX
Removed: 2021-01-04

## (undated) DEVICE — ZIMMER® STERILE DISPOSABLE TOURNIQUET CUFF WITH PLC, DUAL PORT, SINGLE BLADDER, 18 IN. (46 CM)

## (undated) DEVICE — DRILL BIT, AO DIA2.0MM X 135MM, SCALED: Brand: VARIAX

## (undated) DEVICE — CORD,CAUTERY,BIPOLAR,STERILE: Brand: MEDLINE

## (undated) DEVICE — UPPER EXTREMTY: Brand: MEDLINE INDUSTRIES, INC.

## (undated) DEVICE — BANDAGE COMPR W4INXL12FT E DISP ESMARCH EVEN

## (undated) DEVICE — SYRINGE IRRIG 60ML SFT PLIABLE BLB EZ TO GRP 1 HND USE W/

## (undated) DEVICE — MINOR SET UP PK

## (undated) DEVICE — DRESSING,GAUZE,XEROFORM,CURAD,1"X8",ST: Brand: CURAD

## (undated) DEVICE — SPLINT ORTH W3XL12IN LAYERED FBRGLS FOAM PD BRTH BK MOLD

## (undated) DEVICE — SUTURE VICRYL + SZ 3-0 L18IN ABSRB UD SH 1/2 CIR TAPERCUT NDL VCP864D

## (undated) DEVICE — SUTURE NONABSORBABLE MONOFILAMENT 4-0 PS-2 18 IN BLK ETHILON 1667G

## (undated) DEVICE — STERILE VELCLOSE ELASTIC BANDAGE, 4IN: Brand: VELCLOSE

## (undated) DEVICE — UNTHREADED GUIDE WIRE: Brand: FIXOS

## (undated) DEVICE — MERCY HEALTH WEST TURNOVER: Brand: MEDLINE INDUSTRIES, INC.

## (undated) DEVICE — MEDICINE CUP, GRADUATED, STER: Brand: MEDLINE

## (undated) DEVICE — GLOVE SURG SZ 65 THK91MIL LTX FREE SYN POLYISOPRENE

## (undated) DEVICE — ALCOHOL RUBBING 16OZ 70% ISO

## (undated) DEVICE — GOWN SIRUS NONREIN LG W/TWL: Brand: MEDLINE INDUSTRIES, INC.

## (undated) DEVICE — PENCIL ES L3M BTTN SWCH S STL HEX LOK BLDE ELECTRD HOLSTER

## (undated) DEVICE — TOWEL,OR,DSP,ST,BLUE,STD,4/PK,20PK/CS: Brand: MEDLINE

## (undated) DEVICE — ELECTRODE PT RET AD L9FT HI MOIST COND ADH HYDRGEL CORDED

## (undated) DEVICE — BANDAGE COMPR W4INXL15FT BGE E SGL LAYERED CLP CLSR

## (undated) DEVICE — PRECISION THIN (5.5 X 0.38 X 11.5MM)

## (undated) DEVICE — NEEDLE HYPO 25GA L1.5IN BVL ORIENTED ECLIPSE

## (undated) DEVICE — STERILE POLYISOPRENE POWDER-FREE SURGICAL GLOVES: Brand: PROTEXIS

## (undated) DEVICE — TUBING, SUCTION, 1/4" X 12', STRAIGHT: Brand: MEDLINE

## (undated) DEVICE — SUTURE MCRYL SZ 4-0 L18IN ABSRB UD L19MM PS-2 3/8 CIR PRIM Y496G

## (undated) DEVICE — Z DISCONTINUED PER MEDLINE (LOW STOCK)  USE 2422770 DRAPE C ARM W54XL78IN FOR FLROSCN